# Patient Record
Sex: FEMALE | Race: WHITE | Employment: FULL TIME | URBAN - METROPOLITAN AREA
[De-identification: names, ages, dates, MRNs, and addresses within clinical notes are randomized per-mention and may not be internally consistent; named-entity substitution may affect disease eponyms.]

---

## 2017-04-19 ENCOUNTER — APPOINTMENT (OUTPATIENT)
Dept: RADIOLOGY | Facility: IMAGING CENTER | Age: 57
End: 2017-04-19
Attending: PHYSICIAN ASSISTANT
Payer: COMMERCIAL

## 2017-04-19 ENCOUNTER — OFFICE VISIT (OUTPATIENT)
Dept: URGENT CARE | Facility: CLINIC | Age: 57
End: 2017-04-19
Payer: COMMERCIAL

## 2017-04-19 VITALS
TEMPERATURE: 97 F | BODY MASS INDEX: 22.66 KG/M2 | HEART RATE: 77 BPM | RESPIRATION RATE: 16 BRPM | DIASTOLIC BLOOD PRESSURE: 68 MMHG | WEIGHT: 149 LBS | SYSTOLIC BLOOD PRESSURE: 110 MMHG | OXYGEN SATURATION: 97 %

## 2017-04-19 DIAGNOSIS — J06.9 URI WITH COUGH AND CONGESTION: ICD-10-CM

## 2017-04-19 DIAGNOSIS — J44.1 COPD EXACERBATION (HCC): ICD-10-CM

## 2017-04-19 DIAGNOSIS — J01.40 ACUTE PANSINUSITIS, UNSPECIFIED: ICD-10-CM

## 2017-04-19 DIAGNOSIS — J40 BRONCHITIS: ICD-10-CM

## 2017-04-19 DIAGNOSIS — J40 BRONCHITIS: Primary | ICD-10-CM

## 2017-04-19 PROCEDURE — 99214 OFFICE O/P EST MOD 30 MIN: CPT | Performed by: PHYSICIAN ASSISTANT

## 2017-04-19 PROCEDURE — 71020 DX-CHEST-2 VIEWS: CPT | Mod: TC | Performed by: PHYSICIAN ASSISTANT

## 2017-04-19 RX ORDER — DOXYCYCLINE HYCLATE 100 MG
100 TABLET ORAL 2 TIMES DAILY
Qty: 14 TAB | Refills: 0 | Status: SHIPPED | OUTPATIENT
Start: 2017-04-19 | End: 2017-04-26

## 2017-04-19 RX ORDER — METHYLPREDNISOLONE 4 MG/1
TABLET ORAL
Qty: 21 TAB | Refills: 0 | Status: SHIPPED | OUTPATIENT
Start: 2017-04-19 | End: 2017-04-25

## 2017-04-19 ASSESSMENT — COPD QUESTIONNAIRES: COPD: 1

## 2017-04-19 ASSESSMENT — ENCOUNTER SYMPTOMS: COUGH: 1

## 2017-04-19 NOTE — MR AVS SNAPSHOT
"        Sita Medina   2017 4:45 PM   Office Visit   MRN: [id number]    Department:  [facility]   Dept Phone:  [phone]    Description:  Female : 1960   Provider:  Johnny Jaffe PA-C           Reason for Visit     Cough x 1 wk, productive cough, chest hurst to breath, shortness of breath and wheezing.  Little stuffy nose and  Lt. ear fullness      Allergies as of 2017     Allergen Noted Reactions    Ceftin 2008   Vomiting    Sounds like \"ceftin\"    Cefzil [Cefprozil] 2017         You were diagnosed with     Bronchitis   [895290]  -  Primary     URI with cough and congestion   [9360212]       Acute pansinusitis, unspecified   [5846282]       COPD exacerbation (CMS-HCC)   [023984]         Vital Signs     Blood Pressure Pulse Temperature Respirations Weight Oxygen Saturation    110/68 mmHg 77 36.1 °C (97 °F) 16 67.586 kg (149 lb) 97%    Last Menstrual Period                   2008           Basic Information     Date Of Birth Sex Race Ethnicity Preferred Language    1960 Female White Unknown English      Problem List              ICD-10-CM Priority Class Noted - Resolved    Shoulder pain, bilateral M25.511, M25.512   10/4/2010 - Present    Sinusitis J32.9   10/4/2010 - Present    Eczema L30.9   10/4/2010 - Present    COPD (chronic obstructive pulmonary disease) (CMS-HCC) J44.9   10/4/2010 - Present    Osteopenia M85.80   10/4/2010 - Present    Health maintenance examination Z00.00   10/4/2010 - Present      Health Maintenance        Date Due Completion Dates    IMM DTaP/Tdap/Td Vaccine (1 - Tdap) 1977 ---    IMM PNEUMOCOCCAL 19-64 (ADULT) MEDIUM RISK SERIES (1 of 1 - PPSV23) 1977 ---    PAP SMEAR 1979 ---    COLONOSCOPY 2008 ---    MAMMOGRAM 2009, 2008, 2007, 2007, 2007, 2007, 2006, 2005            Current Immunizations     No immunizations on file.      Below and/or attached are the " medications your provider expects you to take. Review all of your home medications and newly ordered medications with your provider and/or pharmacist. Follow medication instructions as directed by your provider and/or pharmacist. Please keep your medication list with you and share with your provider. Update the information when medications are discontinued, doses are changed, or new medications (including over-the-counter products) are added; and carry medication information at all times in the event of emergency situations     Allergies:  CEFTIN - Vomiting     CEFZIL - (reactions not documented)               Medications  Valid as of: April 19, 2017 -  5:36 PM    Generic Name Brand Name Tablet Size Instructions for use    ALBUTEROL INH 100MG/250 ML NS   1 Puff by Inhalation route PRN Every 4-6 prn         Doxycycline Hyclate (Tab) VIBRAMYCIN 100 MG Take 1 Tab by mouth 2 times a day for 7 days.        Fluticasone Furoate-Vilanterol   Inhale  by mouth.        Fluticasone Propionate HFA (Aerosol) FLOVENT  MCG/ACT Inhale 2 Puffs by mouth 2 times a day.        Fluticasone Propionate HFA (Aerosol) FLOVENT  MCG/ACT Inhale 2 Puffs by mouth 2 times a day.        Hydrocod Polst-Chlorphen Polst (Suspension Extended Release) TUSSIONEX 10-8 MG/5ML Take 5 mL by mouth every 12 hours.        MethylPREDNISolone (Tablet Therapy Pack) MEDROL DOSEPAK 4 MG Use as directed        Mometasone Furo-Formoterol Fum (Aerosol) Mometasone Furo-Formoterol Fum 200-5 MCG/ACT Inhale  by mouth.        Salmeterol Xinafoate (AEROSOL POWDER, BREATH ACTIVATED) SEREVENT 50 MCG/DOSE Inhale 1 Puff by mouth 2 times a day.        .                 Medicines prescribed today were sent to:     [id number] - [place] - [address]    [address] [place] [address]    Phone: [phone] Fax: [phone]    Open 24 Hours?: No      Medication refill instructions:       If your prescription bottle indicates you have medication  refills left, it is not necessary to call your provider’s office. Please contact your pharmacy and they will refill your medication.    If your prescription bottle indicates you do not have any refills left, you may request refills at any time through one of the following ways: The online [email or web address] system (except Urgent Care), by calling your provider’s office, or by asking your pharmacy to contact your provider’s office with a refill request. Medication refills are processed only during regular business hours and may not be available until the next business day. Your provider may request additional information or to have a follow-up visit with you prior to refilling your medication.   *Please Note: Medication refills are assigned a new Rx number when refilled electronically. Your pharmacy may indicate that no refills were authorized even though a new prescription for the same medication is available at the pharmacy. Please request the medicine by name with the pharmacy before contacting your provider for a refill.        Your To Do List     Future Labs/Procedures Complete By Expires    DX-CHEST-2 VIEWS  As directed 4/19/2018      Instructions    Bronchitis  Bronchitis is the body's way of reacting to injury and/or infection (inflammation) of the bronchi. Bronchi are the air tubes that extend from the windpipe into the lungs. If the inflammation becomes severe, it may cause shortness of breath.  CAUSES   Inflammation may be caused by:  · A virus.  · Germs (bacteria).  · Dust.  · Allergens.  · Pollutants and many other irritants.  The cells lining the bronchial tree are covered with tiny hairs (cilia). These constantly beat upward, away from the lungs, toward the mouth. This keeps the lungs free of pollutants. When these cells become too irritated and are unable to do their job, mucus begins to develop. This causes the characteristic cough of bronchitis. The cough clears the lungs when the cilia are unable to do their  job. Without either of these protective mechanisms, the mucus would settle in the lungs. Then you would develop pneumonia.  Smoking is a common cause of bronchitis and can contribute to pneumonia. Stopping this habit is the single most important thing you can do to help yourself.  TREATMENT   · Your caregiver may prescribe an antibiotic if the cough is caused by bacteria. Also, medicines that open up your airways make it easier to breathe. Your caregiver may also recommend or prescribe an expectorant. It will loosen the mucus to be coughed up. Only take over-the-counter or prescription medicines for pain, discomfort, or fever as directed by your caregiver.  · Removing whatever causes the problem (smoking, for example) is critical to preventing the problem from getting worse.  · Cough suppressants may be prescribed for relief of cough symptoms.  · Inhaled medicines may be prescribed to help with symptoms now and to help prevent problems from returning.  · For those with recurrent (chronic) bronchitis, there may be a need for steroid medicines.  SEEK IMMEDIATE MEDICAL CARE IF:   · During treatment, you develop more pus-like mucus (purulent sputum).  · You have a fever.  · Your baby is older than 3 months with a rectal temperature of 102° F (38.9° C) or higher.  · Your baby is 3 months old or younger with a rectal temperature of 100.4° F (38° C) or higher.  · You become progressively more ill.  · You have increased difficulty breathing, wheezing, or shortness of breath.  It is necessary to seek immediate medical care if you are elderly or sick from any other disease.  MAKE SURE YOU:   · Understand these instructions.  · Will watch your condition.  · Will get help right away if you are not doing well or get worse.  Document Released: 12/18/2006 Document Revised: 03/11/2013 Document Reviewed: 10/27/2009  k[employer]ify® Patient Information ©2014 [employer].            TSC[employer] Access Code: [email or web address]  Expires:  5/7/2017 10:05 AM    Your email address is not on file at [email or web address].  Email Addresses are required for you to sign up for newScale, please contact [phone] to verify your personal information and to provide your email address prior to attempting to register for newScale.    Sita Medina  [address] [place] [address]    Videumhart  A secure, online tool to manage your health information     [email or web address]’s newScale® is a secure, online tool that connects you to your personalized health information from the privacy of your home -- day or night - making it very easy for you to manage your healthcare. Once the activation process is completed, you can even access your medical information using the newScale vivek, which is available for free in the Apple Vivek store or Google Play store.     To learn more about newScale, visit [email or web address]    There are two levels of access available (as shown below):   My Chart Features  [facility] Primary Care Doctor [facility]  Specialists [facility]  Urgent  Care Non-[facility] Primary Care Doctor   Email your healthcare team securely and privately 24/7 X X X    Manage appointments: schedule your next appointment; view details of past/upcoming appointments X      Request prescription refills. X      View recent personal medical records, including lab and immunizations X X X X   View health record, including health history, allergies, medications X X X X   Read reports about your outpatient visits, procedures, consult and ER notes X X X X   See your discharge summary, which is a recap of your hospital and/or ER visit that includes your diagnosis, lab results, and care plan X X  X     How to register for [employer]:  Once your e-mail address has been verified, follow the following steps to sign up for [employer].     1. Go to  [email or web address]  2. Click on the Sign Up Now box, which takes you to the New Member Sign Up page. You will need to provide the following  information:  a. Enter your Convene Access Code exactly as it appears at the top of this page. (You will not need to use this code after you’ve completed the sign-up process. If you do not sign up before the expiration date, you must request a new code.)   b. Enter your date of birth.   c. Enter your home email address.   d. Click Submit, and follow the next screen’s instructions.  3. Create a Convene ID. This will be your Convene login ID and cannot be changed, so think of one that is secure and easy to remember.  4. Create a Convene password. You can change your password at any time.  5. Enter your Password Reset Question and Answer. This can be used at a later time if you forget your password.   6. Enter your e-mail address. This allows you to receive e-mail notifications when new information is available in Convene.  7. Click Sign Up. You can now view your health information.    For assistance activating your Convene account, call [phone]

## 2017-04-20 NOTE — PROGRESS NOTES
Subjective:      PT is a 57 y.o. female who presents with Cough            Cough  This is a new problem. The current episode started in the past 7 days. The problem has been gradually worsening. The problem occurs constantly. The cough is productive of purulent sputum. The symptoms are aggravated by cold air, exercise and lying down. She has tried OTC cough suppressant for the symptoms. The treatment provided no relief. Her past medical history is significant for bronchitis, COPD and pneumonia. There is no history of asthma, bronchiectasis or emphysema.   PT presents to  clinic today complaining of sore throat, fevers, chills, watery eyes, pressure in ears, cough, fatigue, runny nose, wheezing and SOB. PT denies CP, NVD, abdominal pain, joint pain. PT states these symptoms began around 7 days ago and that the pt's family has been sick on and off for the last week. Pt has not taken any medications for this condition. PT states the pain is a 7/10 with coughing fits, aching in nature and worse at night.  The pt's medication list, problem list, and allergies have been evaluated and reviewed during today's visit.    PMH:  Past Medical History   Diagnosis Date   • ASTHMA    • Recurrent acute sinusitis    • Eczema 10/4/2010   • COPD (chronic obstructive pulmonary disease) (CMS-HCC) 10/4/2010   • COPD (chronic obstructive pulmonary disease) (CMS-HCC) 10/4/2010   • Osteopenia 10/4/2010       PSH:  Negative per pt.      Fam Hx:    family history includes Allergies in her mother; Cancer in her mother; Diabetes in her father.  Family Status   Relation Status Death Age   • Mother Alive    • Father Alive    • Brother Alive    • Brother Alive        Soc HX:  Social History     Social History   • Marital Status:      Spouse Name: N/A   • Number of Children: N/A   • Years of Education: N/A     Occupational History   • Not on file.     Social History Main Topics   • Smoking status: Former Smoker     Quit date: 01/04/2010   •  Smokeless tobacco: Not on file   • Alcohol Use: No   • Drug Use: No   • Sexual Activity:     Partners: Male     Other Topics Concern   • Not on file     Social History Narrative         Medications:    Current outpatient prescriptions:   •  Fluticasone Furoate-Vilanterol (BREO ELLIPTA INH), Inhale  by mouth., Disp: , Rfl:   •  doxycycline (VIBRAMYCIN) 100 MG Tab, Take 1 Tab by mouth 2 times a day., Disp: 14 Tab, Rfl: 0  •  predniSONE (DELTASONE) 20 MG Tab, Take 2 Tabs by mouth every day., Disp: 14 Tab, Rfl: 0  •  Mometasone Furo-Formoterol Fum (DULERA) 200-5 MCG/ACT AERO, Inhale  by mouth., Disp: , Rfl:   •  methylPREDNISolone (MEDROL DOSPACK) 4 MG TABS, Use PRIETO as directed, Disp: 1 Tab, Rfl: 0  •  fluticasone (FLOVENT HFA) 220 MCG/ACT AERO, Inhale 2 Puffs by mouth 2 times a day., Disp: 1 Inhaler, Rfl: 1  •  salmeterol (SEREVENT DISKUS) 50 MCG/DOSE AEPB, Inhale 1 Puff by mouth 2 times a day., Disp: 1 Each, Rfl: 1  •  fluticasone (FLOVENT HFA) 110 MCG/ACT AERO, Inhale 2 Puffs by mouth 2 times a day., Disp: , Rfl:   •  ALBUTEROL INH 100MG/250 ML NS, 1 Puff by Inhalation route PRN Every 4-6 prn , Disp: , Rfl:       Allergies:  Ceftin and Cefzil        Review of Systems   Respiratory: Positive for cough.    Constitutional: Positive for chills and malaise/fatigue. Negative for fever and diaphoresis.   HENT: Positive for congestion, ear pain and sore throat. Negative for ear discharge, hearing loss, nosebleeds and tinnitus.    Eyes: Negative for blurred vision, double vision and photophobia.   Respiratory continued: Positive for cough, sputum production, shortness of breath and wheezing. Negative for hemoptysis.    Cardiovascular: Negative for chest pain and palpitations.   Gastrointestinal: Negative for nausea, vomiting, abdominal pain, diarrhea and constipation.   Genitourinary: Negative for dysuria and flank pain.   Musculoskeletal: Negative for joint pain and myalgias.   Skin: Negative for itching and rash.    Neurological: Positive for headaches. Negative for dizziness, tingling and weakness.   Endo/Heme/Allergies: Does not bruise/bleed easily.   Psychiatric/Behavioral: Negative for depression. The patient is not nervous/anxious.               Objective:     /68 mmHg  Pulse 77  Temp(Src) 36.1 °C (97 °F)  Resp 16  Wt 67.586 kg (149 lb)  SpO2 97%  LMP 04/02/2008     Physical Exam       Physical Exam   Constitutional: PT is oriented to person, place, and time. PT appears well-developed and well-nourished. No distress.   HENT:   Head: Normocephalic and atraumatic.   Right Ear: Hearing, tympanic membrane, external ear and ear canal normal.   Left Ear: Hearing, tympanic membrane, external ear and ear canal normal.   Nose: Mucosal edema, rhinorrhea and sinus tenderness present. Right sinus exhibits frontal sinus tenderness. Left sinus exhibits frontal sinus tenderness.   Mouth/Throat: Uvula is midline. Mucous membranes are pale. Posterior oropharyngeal edema and posterior oropharyngeal erythema present. No oropharyngeal exudate.   Eyes: Conjunctivae normal and EOM are normal. Pupils are equal, round, and reactive to light. Right eye exhibits no discharge. Left eye exhibits no discharge.   Neck: Normal range of motion. Neck supple. No thyromegaly present.   Cardiovascular: Normal rate, regular rhythm, normal heart sounds and intact distal pulses.  Exam reveals no gallop and no friction rub.    No murmur heard.  Pulmonary/Chest: Effort normal. No respiratory distress. PT has wheezes. PT has no rales. PT exhibits tenderness.   Abdominal: Soft. Bowel sounds are normal. PT exhibits no distension and no mass. There is no tenderness. There is no rebound and no guarding.   Musculoskeletal: Normal range of motion. PT exhibits no edema and no tenderness.   Lymphadenopathy:     PT has no cervical adenopathy.   Neurological: Pt is alert and oriented to person, place, and time. Pt has normal reflexes. No cranial nerve deficit.    Skin: Skin is warm and dry. No rash noted. No erythema.   Psychiatric: PT has a normal mood and affect. Pt behavior is normal. Judgment and thought content normal.      RADS:    Narrative        4/19/2017 5:19 PM    HISTORY/REASON FOR EXAM:  Cough.  Left chest pain    TECHNIQUE/EXAM DESCRIPTION AND NUMBER OF VIEWS:  Two views of the chest.    COMPARISON:  None.    FINDINGS:  The lungs are clear.    The cardiac silhouette: normal in size.    Pleura: There are no pleural effusion or pneumothoraces.    Osseous structures: No significant bony abnormality is present.         Impression        Negative two views of the chest.         Reading Provider Reading Date     Narinder Rocha M.D. Apr 19, 2017         Signing Provider Signing Date Signing Time     Narinder Rocha M.D. Apr 19, 2017  5:24 PM         Assessment/Plan:     1. Bronchitis    - DX-CHEST-2 VIEWS; Future    2. URI with cough and congestion    - DX-CHEST-2 VIEWS; Future    3. Acute pansinusitis, unspecified    4. COPD exacerbation    Doxycycline  Tussionex  Medrol pack  Rest, fluids encouraged.  OTC decongestant for congestion/cough  AVS with medical info given.  Pt was in full understanding and agreement with the plan.  Follow-up as needed if symptoms worsen or fail to improve.

## 2017-04-20 NOTE — PATIENT INSTRUCTIONS

## 2018-10-02 ENCOUNTER — OFFICE VISIT (OUTPATIENT)
Dept: URGENT CARE | Facility: CLINIC | Age: 58
End: 2018-10-02
Payer: COMMERCIAL

## 2018-10-02 VITALS
OXYGEN SATURATION: 96 % | TEMPERATURE: 98.8 F | BODY MASS INDEX: 22.43 KG/M2 | RESPIRATION RATE: 18 BRPM | SYSTOLIC BLOOD PRESSURE: 100 MMHG | HEIGHT: 68 IN | DIASTOLIC BLOOD PRESSURE: 58 MMHG | WEIGHT: 148 LBS | HEART RATE: 63 BPM

## 2018-10-02 DIAGNOSIS — J01.01 ACUTE RECURRENT MAXILLARY SINUSITIS: ICD-10-CM

## 2018-10-02 PROCEDURE — 99214 OFFICE O/P EST MOD 30 MIN: CPT | Performed by: PHYSICIAN ASSISTANT

## 2018-10-02 RX ORDER — AMOXICILLIN AND CLAVULANATE POTASSIUM 875; 125 MG/1; MG/1
1 TABLET, FILM COATED ORAL 2 TIMES DAILY
Qty: 14 TAB | Refills: 0 | Status: SHIPPED | OUTPATIENT
Start: 2018-10-02 | End: 2018-10-09

## 2018-10-02 ASSESSMENT — ENCOUNTER SYMPTOMS
SINUS PRESSURE: 1
SWOLLEN GLANDS: 0
SENSORY CHANGE: 0
ABDOMINAL PAIN: 0
COUGH: 1
TINGLING: 0
SORE THROAT: 0
HEADACHES: 1
CHILLS: 0
SPUTUM PRODUCTION: 0
HOARSE VOICE: 0
FEVER: 0
SINUS PAIN: 1
PALPITATIONS: 0
VOMITING: 0
MYALGIAS: 0
DIARRHEA: 0
NAUSEA: 0
WHEEZING: 0
FOCAL WEAKNESS: 0
SHORTNESS OF BREATH: 0

## 2018-10-02 NOTE — PROGRESS NOTES
Subjective:      Sita Medina is a 58 y.o. female who presents with Sinus Problem (C/o green/bloody mucus discarge from nose, sinus congestion, post nasal drip, productive cough x2 days )            Sinus Problem   This is a new problem. Episode onset: 3-4 days  The problem has been gradually worsening since onset. There has been no fever. Associated symptoms include congestion, coughing (brownish-green ), headaches and sinus pressure (with purulent and blood rhinorrhea ). Pertinent negatives include no chills, ear pain, hoarse voice, shortness of breath, sore throat or swollen glands. Treatments tried: Aireborne, Breo  The treatment provided no relief.     Patient has history of COPD and asthma. Current smoker. She also has history of recurrent sinusitis.     Past Medical History:   Diagnosis Date   • ASTHMA    • COPD (chronic obstructive pulmonary disease) ([facility]) 10/4/2010   • COPD (chronic obstructive pulmonary disease) ([facility]) 10/4/2010   • Eczema 10/4/2010   • Osteopenia 10/4/2010   • Recurrent acute sinusitis        History reviewed. No pertinent surgical history.    Family History   Problem Relation Age of Onset   • Cancer Mother         breast   • Allergies Mother    • Diabetes Father        Allergies   Allergen Reactions   • Cefzil [Cefprozil]        Medications, Allergies, and current problem list reviewed today in Epic    Review of Systems   Constitutional: Negative for chills, fever and malaise/fatigue.   HENT: Positive for congestion, sinus pain and sinus pressure (with purulent and blood rhinorrhea ). Negative for ear discharge, ear pain, hoarse voice and sore throat.    Respiratory: Positive for cough (brownish-green ). Negative for sputum production, shortness of breath and wheezing.    Cardiovascular: Negative for chest pain, palpitations and leg swelling.   Gastrointestinal: Negative for abdominal pain, diarrhea, nausea and vomiting.   Musculoskeletal: Negative for myalgias.   Skin: Negative  "for rash.   Neurological: Positive for headaches. Negative for tingling, sensory change and focal weakness.     All other systems reviewed and are negative.          Objective:     /58 (BP Location: Left arm, Patient Position: Sitting, BP Cuff Size: Adult)   Pulse 63   Temp 37.1 °C (98.8 °F) (Temporal)   Resp 18   Ht 1.727 m (5' 8\")   Wt 67.1 kg (148 lb)   LMP 04/02/2008   SpO2 96%   BMI 22.50 kg/m²      Physical Exam   Constitutional: She is oriented to person, place, and time. She appears well-developed and well-nourished. No distress.   HENT:   Head: Normocephalic and atraumatic.   Right Ear: Tympanic membrane, external ear and ear canal normal.   Left Ear: Tympanic membrane, external ear and ear canal normal.   Nose: Mucosal edema and rhinorrhea present. Right sinus exhibits maxillary sinus tenderness. Left sinus exhibits maxillary sinus tenderness.   Mouth/Throat: Uvula is midline, oropharynx is clear and moist and mucous membranes are normal.   Eyes: Conjunctivae are normal.   Neck: Neck supple.   Cardiovascular: Normal rate, regular rhythm and normal heart sounds.  Exam reveals no gallop and no friction rub.    No murmur heard.  Pulmonary/Chest: Effort normal and breath sounds normal. No respiratory distress. She has no wheezes. She has no rales.   Lymphadenopathy:     She has no cervical adenopathy.   Neurological: She is alert and oriented to person, place, and time. No cranial nerve deficit.   Skin: Skin is warm and dry. No rash noted.   Psychiatric: She has a normal mood and affect. Her behavior is normal. Judgment and thought content normal.               Assessment/Plan:     1. Acute recurrent maxillary sinusitis  amoxicillin-clavulanate (AUGMENTIN) 875-125 MG Tab       •  amoxicillin-clavulanate (AUGMENTIN) 875-125 MG Tab, Take 1 Tab by mouth 2 times a day for 7 days., Disp: 14 Tab, Rfl: 0    - continue Breo     Differential diagnoses, Supportive care, and indications for immediate " follow-up discussed with patient.   Instructed to return to clinic or nearest emergency department for any change in condition, further concerns, or worsening of symptoms.    .  The patient demonstrated a good understanding and agreed with the treatment plan.    Frieda Awad P.A.-C.

## 2018-12-09 ENCOUNTER — OFFICE VISIT (OUTPATIENT)
Dept: URGENT CARE | Facility: CLINIC | Age: 58
End: 2018-12-09
Payer: COMMERCIAL

## 2018-12-09 VITALS
HEIGHT: 69 IN | BODY MASS INDEX: 20.73 KG/M2 | SYSTOLIC BLOOD PRESSURE: 100 MMHG | DIASTOLIC BLOOD PRESSURE: 60 MMHG | HEART RATE: 89 BPM | TEMPERATURE: 98.1 F | WEIGHT: 140 LBS | OXYGEN SATURATION: 95 %

## 2018-12-09 DIAGNOSIS — J02.0 STREP THROAT: ICD-10-CM

## 2018-12-09 PROCEDURE — 99214 OFFICE O/P EST MOD 30 MIN: CPT | Performed by: FAMILY MEDICINE

## 2018-12-09 RX ORDER — PREDNISONE 20 MG/1
TABLET ORAL
Qty: 3 TAB | Refills: 0 | Status: SHIPPED | OUTPATIENT
Start: 2018-12-09 | End: 2019-01-02

## 2018-12-09 RX ORDER — ISOPROPYL ALCOHOL 91 %
5000 SPRAY, NON-AEROSOL (ML) TOPICAL
Refills: 11 | COMMUNITY
Start: 2018-11-25 | End: 2019-11-04

## 2018-12-09 RX ORDER — IBUPROFEN 200 MG
200 TABLET ORAL EVERY 6 HOURS PRN
COMMUNITY
End: 2019-01-02

## 2018-12-09 RX ORDER — PENICILLIN V POTASSIUM 500 MG/1
500 TABLET ORAL 3 TIMES DAILY
Qty: 30 TAB | Refills: 0 | Status: SHIPPED | OUTPATIENT
Start: 2018-12-09 | End: 2018-12-19

## 2018-12-09 ASSESSMENT — ENCOUNTER SYMPTOMS
SHORTNESS OF BREATH: 0
SWOLLEN GLANDS: 1
HEADACHES: 0
CARDIOVASCULAR NEGATIVE: 1
TROUBLE SWALLOWING: 1
ABDOMINAL PAIN: 0
WHEEZING: 0
HOARSE VOICE: 0
STRIDOR: 0
COUGH: 0
EYES NEGATIVE: 1

## 2018-12-09 NOTE — PROGRESS NOTES
"Subjective:      Sita Medina is a 58 y.o. female who presents with Cold Symptoms (x3 days. Sore throat, post nasal drip, sinus congestion, fever.)            Pharyngitis    This is a new problem. Episode onset: 3 days. The problem has been gradually worsening. Maximum temperature: has had fever. The pain is moderate. Associated symptoms include swollen glands and trouble swallowing (lst night she felt she could not swallow whewn she layed down, but not at the present time). Pertinent negatives include no abdominal pain, coughing (had it few weeks ago), drooling, headaches, hoarse voice, shortness of breath or stridor. She has had no exposure to strep or mono. She has tried nothing for the symptoms. The treatment provided no relief.       Review of Systems   HENT: Positive for trouble swallowing (lst night she felt she could not swallow whewn she layed down, but not at the present time). Negative for drooling and hoarse voice.    Eyes: Negative.    Respiratory: Negative for cough (had it few weeks ago), shortness of breath, wheezing and stridor.    Cardiovascular: Negative.    Gastrointestinal: Negative for abdominal pain.   Skin: Negative.    Neurological: Negative for headaches.          Objective:     /60   Pulse 89   Temp 36.7 °C (98.1 °F)   Ht 1.753 m (5' 9\")   Wt 63.5 kg (140 lb)   LMP 04/02/2008   SpO2 95%   BMI 20.67 kg/m²      Physical Exam   Constitutional: She is oriented to person, place, and time. She appears well-developed and well-nourished.  Non-toxic appearance. She does not have a sickly appearance. She does not appear ill. No distress.   HENT:   Head: Normocephalic and atraumatic.   Right Ear: Tympanic membrane, external ear and ear canal normal.   Left Ear: Tympanic membrane, external ear and ear canal normal.   Nose: Nose normal. No rhinorrhea.   Mouth/Throat: Uvula is midline. No oral lesions. No trismus in the jaw. No uvula swelling. Oropharyngeal exudate and posterior " oropharyngeal erythema present. No posterior oropharyngeal edema or tonsillar abscesses. Tonsillar exudate.   No white patches seen in the mouth, some erythema also noted in the roof of the mouth tonsils are larger for this age between +1-+2 but no abscess seen, no asymmetry    Eyes: Conjunctivae are normal.   Neck: Neck supple.   Cardiovascular: Normal rate and regular rhythm.  Exam reveals no gallop and no friction rub.    No murmur heard.  Pulmonary/Chest: Effort normal. No stridor. No respiratory distress. She has no wheezes. She has no rales.   Lymphadenopathy:     She has cervical adenopathy.   Neurological: She is alert and oriented to person, place, and time.   Skin: Skin is warm. No rash noted. She is not diaphoretic. No pallor.       Rapid strep: neg            Assessment/Plan:     ASSESSMENT:PLAN:  1. Strep throat  - predniSONE (DELTASONE) 20 MG Tab; 60mg daily for 2 days  Dispense: 3 Tab; Refill: 0  - penicillin v potassium (VEETID) 500 MG Tab; Take 1 Tab by mouth 3 times a day for 10 days.  Dispense: 30 Tab; Refill: 0    Continue OTC meds  Salt water gurgles  Plan per orders and instructions  Warning signs reviewed

## 2018-12-09 NOTE — PATIENT INSTRUCTIONS
Follow up if not significantly improved as expected in 2-3 days, sooner if any worsening or new symptoms    Strep Throat  Strep throat is an infection of the throat. It is caused by germs. Strep throat spreads from person to person because of coughing, sneezing, or close contact.  Follow these instructions at home:  Medicines  · Take over-the-counter and prescription medicines only as told by your doctor.  · Take your antibiotic medicine as told by your doctor. Do not stop taking the medicine even if you feel better.  · Have family members who also have a sore throat or fever go to a doctor.  Eating and drinking  · Do not share food, drinking cups, or personal items.  · Try eating soft foods until your sore throat feels better.  · Drink enough fluid to keep your pee (urine) clear or pale yellow.  General instructions  · Rinse your mouth (gargle) with a salt-water mixture 3-4 times per day or as needed. To make a salt-water mixture, stir ½-1 tsp of salt into 1 cup of warm water.  · Make sure that all people in your house wash their hands well.  · Rest.  · Stay home from school or work until you have been taking antibiotics for 24 hours.  · Keep all follow-up visits as told by your doctor. This is important.  Contact a doctor if:  · Your neck keeps getting bigger.  · You get a rash, cough, or earache.  · You cough up thick liquid that is green, yellow-brown, or bloody.  · You have pain that does not get better with medicine.  · Your problems get worse instead of getting better.  · You have a fever.  Get help right away if:  · You throw up (vomit).  · You get a very bad headache.  · You neck hurts or it feels stiff.  · You have chest pain or you are short of breath.  · You have drooling, very bad throat pain, or changes in your voice.  · Your neck is swollen or the skin gets red and tender.  · Your mouth is dry or you are peeing less than normal.  · You keep feeling more tired or it is hard to wake up.  · Your joints  are red or they hurt.  This information is not intended to replace advice given to you by your health care provider. Make sure you discuss any questions you have with your health care provider.  Document Released: 06/05/2009 Document Revised: 08/16/2017 Document Reviewed: 04/11/2016  Elsevier Interactive Patient Education © 2017 [employer]

## 2019-01-01 ENCOUNTER — HOSPITAL ENCOUNTER (EMERGENCY)
Facility: MEDICAL CENTER | Age: 59
End: 2019-01-02
Attending: EMERGENCY MEDICINE
Payer: COMMERCIAL

## 2019-01-01 DIAGNOSIS — J44.1 ACUTE EXACERBATION OF CHRONIC OBSTRUCTIVE PULMONARY DISEASE (COPD) (HCC): ICD-10-CM

## 2019-01-01 DIAGNOSIS — J18.9 ATYPICAL PNEUMONIA: ICD-10-CM

## 2019-01-01 DIAGNOSIS — R52 BODY ACHES: ICD-10-CM

## 2019-01-01 DIAGNOSIS — J45.901 MILD ASTHMA WITH ACUTE EXACERBATION, UNSPECIFIED WHETHER PERSISTENT: ICD-10-CM

## 2019-01-01 DIAGNOSIS — R53.83 OTHER FATIGUE: ICD-10-CM

## 2019-01-01 DIAGNOSIS — J10.1 INFLUENZA A: ICD-10-CM

## 2019-01-01 PROCEDURE — 99284 EMERGENCY DEPT VISIT MOD MDM: CPT

## 2019-01-01 ASSESSMENT — PAIN SCALES - GENERAL: PAINLEVEL_OUTOF10: 0

## 2019-01-02 ENCOUNTER — APPOINTMENT (OUTPATIENT)
Dept: RADIOLOGY | Facility: MEDICAL CENTER | Age: 59
End: 2019-01-02
Attending: EMERGENCY MEDICINE
Payer: COMMERCIAL

## 2019-01-02 VITALS
SYSTOLIC BLOOD PRESSURE: 135 MMHG | HEIGHT: 69 IN | RESPIRATION RATE: 16 BRPM | BODY MASS INDEX: 21.52 KG/M2 | DIASTOLIC BLOOD PRESSURE: 71 MMHG | TEMPERATURE: 98.2 F | HEART RATE: 94 BPM | OXYGEN SATURATION: 92 % | WEIGHT: 145.28 LBS

## 2019-01-02 LAB
FLUAV RNA SPEC QL NAA+PROBE: POSITIVE
FLUBV RNA SPEC QL NAA+PROBE: NEGATIVE

## 2019-01-02 PROCEDURE — 87502 INFLUENZA DNA AMP PROBE: CPT

## 2019-01-02 PROCEDURE — 94640 AIRWAY INHALATION TREATMENT: CPT

## 2019-01-02 PROCEDURE — 700101 HCHG RX REV CODE 250: Performed by: EMERGENCY MEDICINE

## 2019-01-02 PROCEDURE — 71045 X-RAY EXAM CHEST 1 VIEW: CPT

## 2019-01-02 RX ORDER — PREDNISONE 20 MG/1
20 TABLET ORAL DAILY
Qty: 5 TAB | Refills: 0 | Status: SHIPPED | OUTPATIENT
Start: 2019-01-02 | End: 2019-01-07

## 2019-01-02 RX ORDER — DEXAMETHASONE SODIUM PHOSPHATE 10 MG/ML
10 INJECTION, SOLUTION INTRAMUSCULAR; INTRAVENOUS ONCE
Status: DISCONTINUED | OUTPATIENT
Start: 2019-01-02 | End: 2019-01-02 | Stop reason: HOSPADM

## 2019-01-02 RX ORDER — DEXAMETHASONE SODIUM PHOSPHATE 4 MG/ML
10 INJECTION, SOLUTION INTRA-ARTICULAR; INTRALESIONAL; INTRAMUSCULAR; INTRAVENOUS; SOFT TISSUE ONCE
Status: DISCONTINUED | OUTPATIENT
Start: 2019-01-02 | End: 2019-01-02 | Stop reason: HOSPADM

## 2019-01-02 RX ORDER — IPRATROPIUM BROMIDE AND ALBUTEROL SULFATE 2.5; .5 MG/3ML; MG/3ML
3 SOLUTION RESPIRATORY (INHALATION) ONCE
Status: COMPLETED | OUTPATIENT
Start: 2019-01-02 | End: 2019-01-02

## 2019-01-02 RX ORDER — PREDNISONE 10 MG/1
10 TABLET ORAL PRN
Status: SHIPPED | COMMUNITY
End: 2019-01-02

## 2019-01-02 RX ORDER — AZITHROMYCIN 250 MG/1
TABLET, FILM COATED ORAL
Qty: 6 TAB | Refills: 0 | Status: SHIPPED | OUTPATIENT
Start: 2019-01-02 | End: 2019-11-04

## 2019-01-02 RX ORDER — DEXAMETHASONE SODIUM PHOSPHATE 4 MG/ML
INJECTION, SOLUTION INTRA-ARTICULAR; INTRALESIONAL; INTRAMUSCULAR; INTRAVENOUS; SOFT TISSUE
Status: DISCONTINUED
Start: 2019-01-02 | End: 2019-01-02 | Stop reason: HOSPADM

## 2019-01-02 RX ADMIN — IPRATROPIUM BROMIDE AND ALBUTEROL SULFATE 3 ML: .5; 3 SOLUTION RESPIRATORY (INHALATION) at 00:28

## 2019-01-02 NOTE — ED NOTES
[place] from Lab called with critical result of flu a positive at 0116. Critical lab result read back to [place].   Dr. Phillips notified of critical lab result at 0116  Critical lab result read back by Dr. Phillips.

## 2019-01-02 NOTE — ED NOTES
Patient provided printed discharge instructions which included signs and symptoms to look out for, why to return to ER, and other follow up appointment to make. Patient provided prescriptions, information on medications, and how to . Patient stated they understand discharge instructions and had no further questions or concerns at this time. Patient discharged to home by self. Patient ambulated out of ER with stable gait.

## 2019-01-02 NOTE — FLOWSHEET NOTE
01/02/19 0030   Events/Summary/Plan   Events/Summary/Plan duo X 3   Interdisciplinary Plan of Care-Goals (Indications)   Bronchodilator Indications History / Diagnosis   Interdisciplinary Plan of Care-Outcomes    Bronchodilator Outcome Improved Vital Signs and Measures of Gas Exchange   Education   Education Yes - Pt. / Family has been Instructed in use of Respiratory Equipment;Yes - Pt. / Family has been Instructed in use of Respiratory Medications and Adverse Reactions   RT Assessment of Delivered Medications   Evaluation of Medication Delivery Daily Yes-- Pt /Family has been Instructed in use of Respiratory Medications and Adverse Reactions   SVN Group   #SVN Performed Yes   Given By: Mouthpiece   Date SVN Last Changed 01/02/19   Date SVN Next Change Due (Q 7 Days) 01/09/19   Respiratory WDL   Respiratory (WDL) X   Chest Exam   Respiration 18   Pulse 90   Breath Sounds   Pre/Post Intervention Pre Intervention Assessment   RUL Breath Sounds Diminished   RML Breath Sounds Diminished   RLL Breath Sounds Diminished   BLAISE Breath Sounds Diminished   LLL Breath Sounds Fine Crackles   Oximetry   Continuous Oximetry Yes   Oxygen   Pulse Oximetry 97 %   O2 (LPM) 0   FiO2% 21 %   O2 Daily Delivery Respiratory  Room Air with O2 Available

## 2019-01-02 NOTE — ED TRIAGE NOTES
"Chief Complaint   Patient presents with   • Flu Like Symptoms     Since last Friday     /71   Pulse (!) 105   Temp 36.7 °C (98 °F) (Temporal)   Resp 18   Ht 1.753 m (5' 9\")   Wt 65.9 kg (145 lb 4.5 oz)   LMP 04/02/2008   BMI 21.45 kg/m²     "

## 2019-01-02 NOTE — ED PROVIDER NOTES
ED Provider Note    CHIEF COMPLAINT  Chief Complaint   Patient presents with   • Flu Like Symptoms     Since last Friday     [facility]    Primary care provider: Pcp Not In Computer  Means of arrival: POV  History obtained from: Patient  History limited by: Nothing    Sita Medina is a 58 y.o. female who presents with flulike illness for the last 5 days.  The patient had sick contacts at work last week, and several coworkers all developed what sounds like an upper respiratory infection.  The patient had an occasional cough and has been feeling like she is been wheezing.  She is also had fatigue and body aches.  Occasional rhinorrhea.  No sore throat or chest pain or vomiting or difficulty swallowing, no abdominal pain or extremity swelling.  She did receive a flu shot this year.  She has a history of asthma and COPD, is on Brio and did take 1 dose of prednisone today.  Symptoms have been constant and progressively worsening, prompting her to come in for evaluation and treatment.    REVIEW OF SYSTEMS  Constitutional: Positive for occasional chills and body aches and fatigue.  HENT: Positive for occasional rhinorrhea, no sore throat  Eyes: Negative for vision changes or discharge.   Respiratory: Positive for cough and shortness of breath.    Cardiovascular: Negative for chest pain or palpitations.   Gastrointestinal: Negative for nausea, vomiting, abdominal pain.   Genitourinary: Negative for dysuria or flank pain.   Musculoskeletal: Negative for back pain or joint pain.   Skin: Negative for itching or rash.   Neurological: Negative for sensory or motor changes.   See HPI for further details. All other systems are negative.     PAST MEDICAL HISTORY   has a past medical history of ASTHMA; COPD (chronic obstructive pulmonary disease) ([facility]) (10/4/2010); COPD (chronic obstructive pulmonary disease) ([facility]) (10/4/2010); Eczema (10/4/2010); Osteopenia (10/4/2010); and Recurrent acute sinusitis.    PAST FAMILY  "HISTORY  Family History   Problem Relation Age of Onset   • Cancer Mother         breast   • Allergies Mother    • Diabetes Father        SOCIAL HISTORY  Social History     Social History Main Topics   • Smoking status: Current Some Day Smoker     Last attempt to quit: 1/4/2010   • Smokeless tobacco: Never Used   • Alcohol use No   • Drug use: No   • Sexual activity: Yes     Partners: Male       SURGICAL HISTORY  patient denies any surgical history    CURRENT MEDICATIONS  Home Medications     Reviewed by Ester Flores R.N. (Registered Nurse) on 01/02/19 at 0010  Med List Status: Complete   Medication Last Dose Status   ALBUTEROL INH 100MG/250 ML NS 1/1/2019 Active   BREO ELLIPTA 100-25 MCG/INH AEROSOL POWDER, BREATH ACTIVATED 1/1/2019 Active   [employer] D3 5000 units Cap 1/1/2019 Active   predniSONE (DELTASONE) 10 MG Tab 1/1/2019 Active                ALLERGIES  Allergies   Allergen Reactions   • Cefzil [Cefprozil]      nausea       PHYSICAL EXAM  VITAL SIGNS: /71   Pulse 94   Temp 36.8 °C (98.2 °F) (Temporal)   Resp 16   Ht 1.753 m (5' 9\")   Wt 65.9 kg (145 lb 4.5 oz)   LMP 04/02/2008   SpO2 92%   BMI 21.45 kg/m²    Pulse ox interpretation: On room air, I interpret this pulse ox as normal.  Constitutional: Well developed, well nourished.  Sitting up in mild distress.  HEENT: Normocephalic, atraumatic. Posterior pharynx clear, mucous membranes moist.  Eyes:  EOMI. Normal sclera.  Neck: Supple, Full range of motion, nontender.  Chest/Pulmonary: Scattered expiratory wheezes in all fields, no tachypnea or increased work of breathing.  Cardiovascular: Tachycardic rate, regular rhythm, no murmur.   Abdomen: Soft, nontender, no rebound, guarding, or masses.  Back: No CVA tenderness, nontender midline, no step offs.  Musculoskeletal: No deformity, no edema.  Neuro: Clear speech, normal coordination, cranial nerves II-XII grossly intact.  Psych: Normal mood and affect.  Skin: No rashes, warm and " dry.    DIAGNOSTIC STUDIES / PROCEDURES    LABS & EKG  Results for orders placed or performed during the hospital encounter of 01/01/19   Influenza A/B By PCR   Result Value Ref Range    Influenza virus A RNA POSITIVE (A) Negative    Influenza virus B, PCR Negative Negative     RADIOLOGY  DX-CHEST-PORTABLE (1 VIEW)   Final Result      No acute cardiopulmonary findings.        COURSE & MEDICAL DECISION MAKING    This is a 58 y.o. female who presents with cough and wheezing and fatigue and body aches.  Possible exposure to flu.  Onset 4-5 days ago.    Differential Diagnosis includes but is not limited to:  Influenza, pneumonia, URI, asthma exacerbation, less likely PE, ACS    ED Course:  The patient is extra Tory wheezing, plan to treat her with DuoNeb's.  She took a dose of prednisone today and would not like to take any other steroids at this time as she often is very sensitive to them.  I discussed that they would likely help her breathing but she prefers to defer at this time.  She would also not like a blood draw or IV placement, I shared with her my concerns that she may be slightly dehydrated given she was mildly tachycardic, but even during my examination with her heart rate went down to the 80s.  As such I feel this is reasonable, the patient does not have any chest pain and she is in no respiratory distress, highly doubt ACS or PE.  She has had no bleeding highly doubt pneumonia.  She clearly has an upper respiratory illness so I will screen her with flu testing and a chest x-ray.    Flu a is positive.  Chest x-ray reassuring without lobar pneumonia or cardiomegaly or edema or widened mediastinum or pneumothorax.    On reassessment the patient feels slightly jittery after breathing treatments but is breathing more comfortably.  Her lung sounds are improved.  She has stable normal vital signs.  She would like to go home, and I feel that it is safe for her to do so.  Given that she does have a history of  asthma and started having a productive cough after breathing treatments I am worried that she could have an early atypical pneumonia so I will treat her with a macrolide which will also help with her with pulmonary inflammation given this is a likely asthma/COPD exacerbation triggered by influenza but a secondary infection could be present.  She will follow-up with her primary care provider for recheck in 1-2 days, but return immediately for any new pain, trouble breathing, vomiting, fevers or chills, or any other new or worsening symptoms.  All questions answered, prescription for prednisone and azithromycin provided, and I trust she will heed my advice and return if she gets any worse.    Medications   dexamethasone pf (DECADRON) injection 10 mg (0 mg Oral Dose not Required 1/2/19 0045)   dexamethasone (DECADRON) injection 10 mg (10 mg Oral Refused 1/2/19 0030)   ipratropium-albuterol (DUONEB) nebulizer solution (3 mL Nebulization Given 1/2/19 0028)   ipratropium-albuterol (DUONEB) nebulizer solution (3 mL Nebulization Given 1/2/19 0028)   ipratropium-albuterol (DUONEB) nebulizer solution (3 mL Nebulization Given 1/2/19 0028)     FINAL IMPRESSION  1. Mild asthma with acute exacerbation, unspecified whether persistent    2. Influenza A    3. Acute exacerbation of chronic obstructive pulmonary disease (COPD) (HCC)    4. Atypical pneumonia    5. Other fatigue    6. Body aches        PRESCRIPTIONS  Discharge Medication List as of 1/2/2019  1:48 AM      START taking these medications    Details   azithromycin (ZITHROMAX) 250 MG Tab Take two tabs by mouth on day one, then one tab by mouth daily on days 2-5., Disp-6 Tab, R-0, Print Rx Paper      predniSONE (DELTASONE) 20 MG Tab Take 1 Tab by mouth every day for 5 days., Disp-5 Tab, R-0, Print Rx Paper             FOLLOW UP  [facility], Emergency Dept  [address]  [place] [address]  [phone]  Today  If you have ANY new or worse  symptoms!    Your primary care provider    Schedule an appointment as soon as possible for a visit in 2 days  for a recheck    -DISCHARGE-     Results, exam findings, clinical impression, presumed diagnosis, treatment options, and strict return precautions were discussed with the patient, and they verbalized understanding, agreed with, and appreciated the plan of care.    Pertinent Labs & Imaging studies reviewed and verified by myself, as well as nursing notes and the patient's past medical, family, and social histories (See chart for details).    The patient is referred to a primary physician for blood pressure management, diabetic screening, and for all other preventative health concerns.     Portions of this record were made with voice recognition software.  Despite my review, spelling/grammar/context errors may still remain. Interpretation of this chart should be taken in this context.    Electronically signed by Wade Phillips on 1/2/2019 at 2:26 AM.

## 2019-01-02 NOTE — DISCHARGE INSTRUCTIONS
Family in [address] updated. You were seen and evaluated in the Emergency Department at [facility] for:     Wheezing, body aches.     You had the following tests and studies:    You have the flu! Thankfully your chest xray looks stable.     You received the following medications:    Duoneb breathing treatment.     You received the following prescriptions:    Azithromycin and prednisone. Take and prescribed.   ----------------------------    Please make sure to follow up with:    Your primary care provider for a recheck in 2-3 days, but return to the ER immediately if you have any worsening pain or fevers or trouble feeding or passing out or any other new or worsening symptoms.    Good luck, we hope you get better soon, and happy new year!  ----------------------------    We always encourage patients to return IMMEDIATELY if they have:  Increased or changing pain, passing out, fevers over 100.4 (taken in your mouth or rectally) for more than 2 days, redness or swelling of skin or tissues, feeling like your heart is beating fast, chest pain that is new or worsening, trouble breathing, feeling like your throat is closing up and can not breath, inability to walk, weakness of any part of your body, new dizziness, severe bleeding that won't stop from any part of your body, if you can't eat or drink, or if you have any other concerns.   If you feel worse, please know that you can always return with any questions, concerns, worse symptoms, or you are feeling unsafe. We certainly cannot say for sure that we have ruled out every illness or dangerous disease, but we feel that at this specific time, your exam, tests, and vital signs like heart rate and blood pressure are safe for discharge.

## 2019-01-02 NOTE — ED NOTES
Pt in bed, c/o cough for past 4 days and worried because coworker was diagnosed with the flu at the same time as her symptoms started, and pt worried about bronchitis of pneumonia.

## 2019-01-03 ENCOUNTER — PATIENT OUTREACH (OUTPATIENT)
Dept: HEALTH INFORMATION MANAGEMENT | Facility: OTHER | Age: 59
End: 2019-01-03

## 2019-03-23 ENCOUNTER — OFFICE VISIT (OUTPATIENT)
Dept: URGENT CARE | Facility: CLINIC | Age: 59
End: 2019-03-23
Payer: COMMERCIAL

## 2019-03-23 VITALS
SYSTOLIC BLOOD PRESSURE: 108 MMHG | OXYGEN SATURATION: 94 % | HEART RATE: 68 BPM | HEIGHT: 69 IN | BODY MASS INDEX: 21.48 KG/M2 | WEIGHT: 145 LBS | DIASTOLIC BLOOD PRESSURE: 64 MMHG | TEMPERATURE: 98.8 F

## 2019-03-23 DIAGNOSIS — J01.00 ACUTE NON-RECURRENT MAXILLARY SINUSITIS: ICD-10-CM

## 2019-03-23 DIAGNOSIS — B35.1 ONYCHOMYCOSIS: ICD-10-CM

## 2019-03-23 PROCEDURE — 99214 OFFICE O/P EST MOD 30 MIN: CPT | Performed by: PHYSICIAN ASSISTANT

## 2019-03-23 RX ORDER — AMOXICILLIN AND CLAVULANATE POTASSIUM 875; 125 MG/1; MG/1
1 TABLET, FILM COATED ORAL 2 TIMES DAILY
Qty: 20 TAB | Refills: 0 | Status: SHIPPED | OUTPATIENT
Start: 2019-03-23 | End: 2019-04-02

## 2019-03-23 ASSESSMENT — ENCOUNTER SYMPTOMS
SORE THROAT: 0
HEADACHES: 1
DIARRHEA: 0
SHORTNESS OF BREATH: 0
VOMITING: 0
CHILLS: 0
COUGH: 0
FEVER: 0
SINUS PAIN: 1
NAUSEA: 0
DIZZINESS: 0
ABDOMINAL PAIN: 0
SINUS PRESSURE: 1
SPUTUM PRODUCTION: 0

## 2019-03-23 NOTE — PROGRESS NOTES
"Subjective:      Sita Medina is a 58 y.o. female who presents with Sinus Problem (x2 months. Sinus congestion/pressure, post nasal drip.) and Toe Injury (x3 months. Pt injured toe and is worried about persistent redness around nail bed.)            Sinus Problem   This is a chronic problem. The current episode started more than 1 month ago (2 months). The problem has been waxing and waning since onset. There has been no fever. Her pain is at a severity of 2/10. The pain is mild. Associated symptoms include congestion, headaches and sinus pressure. Pertinent negatives include no chills, coughing, ear pain, shortness of breath or sore throat. Past treatments include nothing.   Toe Injury   This is a new problem. The current episode started more than 1 month ago (3 months). The problem occurs constantly. The problem has been unchanged. Associated symptoms include congestion and headaches. Pertinent negatives include no abdominal pain, chest pain, chills, coughing, fever, nausea, rash, sore throat or vomiting. Exacerbated by: palpation. She has tried nothing for the symptoms.     Patient reports a history of chronic sinus infections, she recently quit smoking and is hopeful that will help them finally clear. She also reports ongoing left great toe pain since injuring it 3 years ago on a door.     Review of Systems   Constitutional: Negative for chills and fever.   HENT: Positive for congestion, sinus pain and sinus pressure. Negative for ear pain and sore throat.    Respiratory: Negative for cough, sputum production and shortness of breath.    Cardiovascular: Negative for chest pain.   Gastrointestinal: Negative for abdominal pain, diarrhea, nausea and vomiting.   Genitourinary: Negative.    Musculoskeletal:        + toe pain   Skin: Negative for rash.   Neurological: Positive for headaches. Negative for dizziness.        Objective:     /64   Pulse 68   Temp 37.1 °C (98.8 °F)   Ht 1.753 m (5' 9\")   " Wt 65.8 kg (145 lb)   LMP 04/02/2008   SpO2 94%   BMI 21.41 kg/m²      Physical Exam   Constitutional: She is oriented to person, place, and time. She appears well-developed and well-nourished. No distress.   HENT:   Head: Normocephalic and atraumatic.   Right Ear: Hearing, tympanic membrane, external ear and ear canal normal.   Left Ear: Hearing, tympanic membrane, external ear and ear canal normal.   Nose: Mucosal edema present. Right sinus exhibits maxillary sinus tenderness. Right sinus exhibits no frontal sinus tenderness. Left sinus exhibits maxillary sinus tenderness. Left sinus exhibits no frontal sinus tenderness.   Mouth/Throat: Oropharynx is clear and moist. No oropharyngeal exudate, posterior oropharyngeal edema or posterior oropharyngeal erythema.   Eyes: Pupils are equal, round, and reactive to light. Conjunctivae are normal. Right eye exhibits no discharge. Left eye exhibits no discharge.   Neck: Normal range of motion.   Cardiovascular: Normal rate, regular rhythm and normal heart sounds.    No murmur heard.  Pulmonary/Chest: Effort normal and breath sounds normal. No respiratory distress. She has no wheezes.   Musculoskeletal: Normal range of motion.        Feet:    No erythema or edema of left great toe. Subungual hematoma present. Thickened and yellow discoloration of left great toe nail. + TTP over distal aspect of toe.    Lymphadenopathy:     She has no cervical adenopathy.   Neurological: She is alert and oriented to person, place, and time.   Skin: Skin is warm and dry. She is not diaphoretic.   Psychiatric: She has a normal mood and affect. Her behavior is normal.   Nursing note and vitals reviewed.         PMH:  has a past medical history of ASTHMA; COPD (chronic obstructive pulmonary disease) ([facility]) (10/4/2010); COPD (chronic obstructive pulmonary disease) ([facility]) (10/4/2010); Eczema (10/4/2010); Osteopenia (10/4/2010); and Recurrent acute sinusitis. She also has no past medical history of  CAD (coronary artery disease); Cancer (HCC); Congestive heart failure (HCC); Diabetes; Hypertension; Infectious disease; Liver disease; Psychiatric disorder; Renal disorder; Seizure disorder (HCC); or Stroke (HCC).  MEDS:   Current Outpatient Prescriptions:   •  amoxicillin-clavulanate (AUGMENTIN) 875-125 MG Tab, Take 1 Tab by mouth 2 times a day for 10 days., Disp: 20 Tab, Rfl: 0  •  [employer] D3 5000 units Cap, Take 5,000 Units by mouth., Disp: , Rfl: 11  •  BREO ELLIPTA 100-25 MCG/INH AEROSOL POWDER, BREATH ACTIVATED, INHALE 1 PUFF BY INHALATION ROUTE EVERY DAY AT THE SAME TIME EACH DAY, Disp: , Rfl: 8  •  azithromycin (ZITHROMAX) 250 MG Tab, Take two tabs by mouth on day one, then one tab by mouth daily on days 2-5., Disp: 6 Tab, Rfl: 0  •  ALBUTEROL INH 100MG/250 ML NS, 1 Puff by Inhalation route PRN Every 4-6 prn , Disp: , Rfl:   ALLERGIES:   Allergies   Allergen Reactions   • Cefzil [Cefprozil]      nausea     SURGHX: History reviewed. No pertinent surgical history.  SOCHX:  reports that she has been smoking.  She has never used smokeless tobacco. She reports that she does not drink alcohol or use drugs.  FH: family history includes Allergies in her mother; Cancer in her mother; Diabetes in her father.       Assessment/Plan:     1. Acute non-recurrent maxillary sinusitis  - amoxicillin-clavulanate (AUGMENTIN) 875-125 MG Tab; Take 1 Tab by mouth 2 times a day for 10 days.  Dispense: 20 Tab; Refill: 0   - Complete full course of antibiotics as prescribed     Discussed use of nedi-pot, humidifier, and Flonase nasal spray for symptomatic relief. Call or return to office if symptoms persist or worsen. The patient demonstrated a good understanding and agreed with the treatment plan.      2. Onychomycosis  - REFERRAL TO PODIATRY    Patient declines imaging of great toe at today's visit. She will follow up with podiatry for further evaluation and management of onychomycosis. The patient demonstrated a good understanding  and agreed with the treatment plan.

## 2019-11-04 ENCOUNTER — HOSPITAL ENCOUNTER (EMERGENCY)
Facility: MEDICAL CENTER | Age: 59
End: 2019-11-05
Attending: EMERGENCY MEDICINE
Payer: COMMERCIAL

## 2019-11-04 ENCOUNTER — APPOINTMENT (OUTPATIENT)
Dept: RADIOLOGY | Facility: MEDICAL CENTER | Age: 59
End: 2019-11-04
Payer: COMMERCIAL

## 2019-11-04 ENCOUNTER — APPOINTMENT (OUTPATIENT)
Dept: RADIOLOGY | Facility: MEDICAL CENTER | Age: 59
End: 2019-11-04
Attending: EMERGENCY MEDICINE
Payer: COMMERCIAL

## 2019-11-04 DIAGNOSIS — R07.9 CHEST PAIN, UNSPECIFIED TYPE: ICD-10-CM

## 2019-11-04 LAB
ALBUMIN SERPL BCP-MCNC: 4.3 G/DL (ref 3.2–4.9)
ALBUMIN/GLOB SERPL: 1.6 G/DL
ALP SERPL-CCNC: 110 U/L (ref 30–99)
ALT SERPL-CCNC: 11 U/L (ref 2–50)
ANION GAP SERPL CALC-SCNC: 9 MMOL/L (ref 0–11.9)
APTT PPP: 28.3 SEC (ref 24.7–36)
AST SERPL-CCNC: 17 U/L (ref 12–45)
BASOPHILS # BLD AUTO: 1 % (ref 0–1.8)
BASOPHILS # BLD: 0.08 K/UL (ref 0–0.12)
BILIRUB SERPL-MCNC: 0.4 MG/DL (ref 0.1–1.5)
BUN SERPL-MCNC: 19 MG/DL (ref 8–22)
CALCIUM SERPL-MCNC: 9.8 MG/DL (ref 8.5–10.5)
CHLORIDE SERPL-SCNC: 106 MMOL/L (ref 96–112)
CO2 SERPL-SCNC: 27 MMOL/L (ref 20–33)
CREAT SERPL-MCNC: 0.84 MG/DL (ref 0.5–1.4)
EKG IMPRESSION: NORMAL
EOSINOPHIL # BLD AUTO: 0.58 K/UL (ref 0–0.51)
EOSINOPHIL NFR BLD: 7.1 % (ref 0–6.9)
ERYTHROCYTE [DISTWIDTH] IN BLOOD BY AUTOMATED COUNT: 44.1 FL (ref 35.9–50)
GLOBULIN SER CALC-MCNC: 2.7 G/DL (ref 1.9–3.5)
GLUCOSE SERPL-MCNC: 91 MG/DL (ref 65–99)
HCG SERPL QL: NEGATIVE
HCT VFR BLD AUTO: 43.4 % (ref 37–47)
HGB BLD-MCNC: 14 G/DL (ref 12–16)
IMM GRANULOCYTES # BLD AUTO: 0.02 K/UL (ref 0–0.11)
IMM GRANULOCYTES NFR BLD AUTO: 0.2 % (ref 0–0.9)
INR PPP: 0.98 (ref 0.87–1.13)
LYMPHOCYTES # BLD AUTO: 3.09 K/UL (ref 1–4.8)
LYMPHOCYTES NFR BLD: 37.7 % (ref 22–41)
MCH RBC QN AUTO: 30.3 PG (ref 27–33)
MCHC RBC AUTO-ENTMCNC: 32.3 G/DL (ref 33.6–35)
MCV RBC AUTO: 93.9 FL (ref 81.4–97.8)
MONOCYTES # BLD AUTO: 0.89 K/UL (ref 0–0.85)
MONOCYTES NFR BLD AUTO: 10.9 % (ref 0–13.4)
NEUTROPHILS # BLD AUTO: 3.53 K/UL (ref 2–7.15)
NEUTROPHILS NFR BLD: 43.1 % (ref 44–72)
NRBC # BLD AUTO: 0 K/UL
NRBC BLD-RTO: 0 /100 WBC
NT-PROBNP SERPL IA-MCNC: 36 PG/ML (ref 0–125)
PLATELET # BLD AUTO: 253 K/UL (ref 164–446)
PMV BLD AUTO: 10.3 FL (ref 9–12.9)
POTASSIUM SERPL-SCNC: 4.6 MMOL/L (ref 3.6–5.5)
PROT SERPL-MCNC: 7 G/DL (ref 6–8.2)
PROTHROMBIN TIME: 13.2 SEC (ref 12–14.6)
RBC # BLD AUTO: 4.62 M/UL (ref 4.2–5.4)
SODIUM SERPL-SCNC: 142 MMOL/L (ref 135–145)
TROPONIN T SERPL-MCNC: <6 NG/L (ref 6–19)
TROPONIN T SERPL-MCNC: <6 NG/L (ref 6–19)
WBC # BLD AUTO: 8.2 K/UL (ref 4.8–10.8)

## 2019-11-04 PROCEDURE — 85025 COMPLETE CBC W/AUTO DIFF WBC: CPT

## 2019-11-04 PROCEDURE — 83880 ASSAY OF NATRIURETIC PEPTIDE: CPT

## 2019-11-04 PROCEDURE — 80053 COMPREHEN METABOLIC PANEL: CPT

## 2019-11-04 PROCEDURE — 84484 ASSAY OF TROPONIN QUANT: CPT

## 2019-11-04 PROCEDURE — 85610 PROTHROMBIN TIME: CPT

## 2019-11-04 PROCEDURE — 84703 CHORIONIC GONADOTROPIN ASSAY: CPT

## 2019-11-04 PROCEDURE — 99285 EMERGENCY DEPT VISIT HI MDM: CPT

## 2019-11-04 PROCEDURE — 93005 ELECTROCARDIOGRAM TRACING: CPT | Performed by: EMERGENCY MEDICINE

## 2019-11-04 PROCEDURE — 71045 X-RAY EXAM CHEST 1 VIEW: CPT

## 2019-11-04 PROCEDURE — 71275 CT ANGIOGRAPHY CHEST: CPT

## 2019-11-04 PROCEDURE — 36415 COLL VENOUS BLD VENIPUNCTURE: CPT

## 2019-11-04 PROCEDURE — 85730 THROMBOPLASTIN TIME PARTIAL: CPT

## 2019-11-04 ASSESSMENT — LIFESTYLE VARIABLES: DO YOU DRINK ALCOHOL: NO

## 2019-11-05 VITALS
RESPIRATION RATE: 19 BRPM | OXYGEN SATURATION: 96 % | WEIGHT: 147.27 LBS | SYSTOLIC BLOOD PRESSURE: 92 MMHG | BODY MASS INDEX: 21.81 KG/M2 | HEART RATE: 62 BPM | DIASTOLIC BLOOD PRESSURE: 65 MMHG | HEIGHT: 69 IN | TEMPERATURE: 97.5 F

## 2019-11-05 RX ORDER — FAMOTIDINE 20 MG/1
20 TABLET, FILM COATED ORAL 2 TIMES DAILY
Qty: 60 TAB | Refills: 0 | Status: SHIPPED | OUTPATIENT
Start: 2019-11-05 | End: 2021-01-22

## 2019-11-05 NOTE — ED NOTES
Patient returned from CT scan via gurney with vitals monitor in place accompanied by ED RN and ICU RN.

## 2019-11-05 NOTE — ED TRIAGE NOTES
"Chief Complaint   Patient presents with   • Chest Pain     sudden onset radiating to epigastric, back x45 min     /71   Pulse 76   Temp 36.4 °C (97.5 °F) (Temporal)   Resp 18   Ht 1.753 m (5' 9\")   Wt 66.8 kg (147 lb 4.3 oz)   SpO2 100%     Pt ambulates with a steady gait to triage in obvious discomfort, with above complaints. Pt denies any cardiac hx or family hx. Pt denies N/V, diaphoresis, lightheaded. Pt report the pain is shooting through her chest into her back.   "

## 2019-11-05 NOTE — ED NOTES
Patient ambulatory from [facility] to [facility] 2 with steady gait accompanied by family and ED Tech. Chart up for ERP.

## 2019-11-05 NOTE — ED NOTES
IV access placed and additional blood drawn and sent to lab.    Patient to CT scan via gurney with vitals monitor in place accompanied by ED RN ad ICU RN.

## 2019-11-05 NOTE — ED PROVIDER NOTES
ED Provider Note    CHIEF COMPLAINT  Chief Complaint   Patient presents with   • Chest Pain     sudden onset radiating to epigastric, back x45 min       HPI  Sita Medina is a 59 y.o. female here for evaluation of chest pain and back pain.  Patient states that she had onset of chest pain that was substernal, with radiation to her back.  She states that this was ongoing for about an hour, and she decided to come in for an evaluation.  Patient has no history of radiation to her back, but she does have intermittent chest pain secondary to some reflux that she is aware of.  She has no fever no chills, no diaphoresis, and no nausea or vomiting.  Patient did not take anything for the discomfort prior to coming in, and she describes as a heaviness that radiates around to her back.  The nursing staff came in to get me to see the patient, secondary to some blood pressure discrepancy that she had a bilateral arms, so a code aorta was initially initiated.  Patient takes no anticoagulants      ROS  See HPI for further details, o/w negative.     PAST MEDICAL HISTORY   has a past medical history of ASTHMA, COPD (chronic obstructive pulmonary disease) ([facility]) (10/4/2010), COPD (chronic obstructive pulmonary disease) ([facility]) (10/4/2010), Eczema (10/4/2010), Osteopenia (10/4/2010), and Recurrent acute sinusitis.    SOCIAL HISTORY  Social History     Tobacco Use   • Smoking status: Former Smoker     Packs/day: 0.00     Last attempt to quit: 2010     Years since quittin.8   • Smokeless tobacco: Never Used   Substance and Sexual Activity   • Alcohol use: No   • Drug use: No   • Sexual activity: Yes     Partners: Male       Family History  No bleeding disorders    SURGICAL HISTORY  patient denies any surgical history    CURRENT MEDICATIONS  Home Medications     Reviewed by Geno Shi R.N. (Registered Nurse) on 19 at 2028  Med List Status: Not Addressed   Medication Last Dose Status   BREO ELLIPTA 100-25  MCG/INH AEROSOL POWDER, BREATH ACTIVATED  Active                ALLERGIES  Allergies   Allergen Reactions   • Cefzil [Cefprozil]      nausea       REVIEW OF SYSTEMS  See HPI for further details. Review of systems as above, otherwise all other systems are negative.     PHYSICAL EXAM  Constitutional: Well developed, well nourished.  Mild acute distress.  HEENT: Normocephalic, atraumatic. Posterior pharynx clear and moist.  Eyes:  EOMI. Normal sclera.  Neck: Supple, Full range of motion, nontender.  Chest/Pulmonary: clear to ausculation. Symmetrical expansion.   Cardio: Regular rate and rhythm with no murmur.   Abdomen: Soft, nontender. No peritoneal signs. No guarding. No palpable masses.  Back: No CVA tenderness, nontender midline, no step offs.  Musculoskeletal: No deformity, no edema, neurovascular intact.   Neuro: Clear speech, appropriate, cooperative, cranial nerves II-XII grossly intact.  Psych: Normal mood and affect    Results for orders placed or performed during the hospital encounter of 11/04/19   CBC with Differential   Result Value Ref Range    WBC 8.2 4.8 - 10.8 K/uL    RBC 4.62 4.20 - 5.40 M/uL    Hemoglobin 14.0 12.0 - 16.0 g/dL    Hematocrit 43.4 37.0 - 47.0 %    MCV 93.9 81.4 - 97.8 fL    MCH 30.3 27.0 - 33.0 pg    MCHC 32.3 (L) 33.6 - 35.0 g/dL    RDW 44.1 35.9 - 50.0 fL    Platelet Count 253 164 - 446 K/uL    MPV 10.3 9.0 - 12.9 fL    Neutrophils-Polys 43.10 (L) 44.00 - 72.00 %    Lymphocytes 37.70 22.00 - 41.00 %    Monocytes 10.90 0.00 - 13.40 %    Eosinophils 7.10 (H) 0.00 - 6.90 %    Basophils 1.00 0.00 - 1.80 %    Immature Granulocytes 0.20 0.00 - 0.90 %    Nucleated RBC 0.00 /100 WBC    Neutrophils (Absolute) 3.53 2.00 - 7.15 K/uL    Lymphs (Absolute) 3.09 1.00 - 4.80 K/uL    Monos (Absolute) 0.89 (H) 0.00 - 0.85 K/uL    Eos (Absolute) 0.58 (H) 0.00 - 0.51 K/uL    Baso (Absolute) 0.08 0.00 - 0.12 K/uL    Immature Granulocytes (abs) 0.02 0.00 - 0.11 K/uL    NRBC (Absolute) 0.00 K/uL    Complete Metabolic Panel (CMP)   Result Value Ref Range    Sodium 142 135 - 145 mmol/L    Potassium 4.6 3.6 - 5.5 mmol/L    Chloride 106 96 - 112 mmol/L    Co2 27 20 - 33 mmol/L    Anion Gap 9.0 0.0 - 11.9    Glucose 91 65 - 99 mg/dL    Bun 19 8 - 22 mg/dL    Creatinine 0.84 0.50 - 1.40 mg/dL    Calcium 9.8 8.5 - 10.5 mg/dL    AST(SGOT) 17 12 - 45 U/L    ALT(SGPT) 11 2 - 50 U/L    Alkaline Phosphatase 110 (H) 30 - 99 U/L    Total Bilirubin 0.4 0.1 - 1.5 mg/dL    Albumin 4.3 3.2 - 4.9 g/dL    Total Protein 7.0 6.0 - 8.2 g/dL    Globulin 2.7 1.9 - 3.5 g/dL    A-G Ratio 1.6 g/dL   Troponin   Result Value Ref Range    Troponin T <6 6 - 19 ng/L   ESTIMATED GFR   Result Value Ref Range    GFR If African American >60 >60 mL/min/1.73 m 2    GFR If Non African American >60 >60 mL/min/1.73 m 2   proBrain Natriuretic Peptide, NT   Result Value Ref Range    NT-proBNP 36 0 - 125 pg/mL   APTT (PTT)   Result Value Ref Range    APTT 28.3 24.7 - 36.0 sec   PROTHROMBIN TIME (INR)   Result Value Ref Range    PT 13.2 12.0 - 14.6 sec    INR 0.98 0.87 - 1.13   HCG QUAL SERUM   Result Value Ref Range    Beta-Hcg Qualitative Serum Negative Negative   TROPONIN   Result Value Ref Range    Troponin T <6 6 - 19 ng/L      CT-CTA COMPLETE THORACOABDOMINAL AORTA   Final Result         1.  Atherosclerosis and atherosclerotic coronary artery disease.   2.  No visualized aneurysm or dissection.   3.  Right thyroid lesion, recommend follow-up thyroid sonography due to size and complexity for further characterization.   4.  Hepatomegaly with large upper abdominal cyst, likely exophytic hepatic cyst.            DX-CHEST-PORTABLE (1 VIEW)   Final Result         1.  No acute cardiopulmonary disease.   2.  Atherosclerosis   3.  Hyperexpansion of lungs favors changes of COPD.            PROCEDURES     MEDICAL RECORD  I have reviewed patient's medical record and pertinent results are listed.    COURSE & MEDICAL DECISION MAKING  I have reviewed any medical  record information, laboratory studies and radiographic results as noted above.    If you have had any blood pressure issues while here in the emergency department, please see your doctor for a further evaluation or work up.    12:28 AM  Patient is nontoxic-appearing, afebrile, and appears comfortable.  She has no pain at this time.  She has 2 sets of negative troponins, a normal EKG, a negative CT aorta.  At this time, I will send the patient home, and she will return if any further issues or concerns.  She will follow-up in the thyroid nodule and liver cyst as noted.  She is aware of the need for further work up  Heart score of zero    EKG; normal sinus rhythm at a rate of 62.  No ST elevation, no ST depression, no ectopy.  Normal intervals.    Differential diagnoses include but not limited to: Dissection, AAA, MI    This patient presents with thyroid nodule, liver cyst and atypical chest pain.  At this time, I have counseled the patient/family regarding their medications, pain control, and follow up.  They will continue their medications, if any, as prescribed.  They will return immediately for any worsening symptoms and/or any other medical concerns.  They will see their doctor, or contact the doctor provided, in 1-2 days for follow up.       FINAL IMPRESSION  Thyroid nodule  Liver cyst  Atypical chest pain      Electronically signed by: Win Omalley, 11/5/2019 12:26 AM

## 2019-11-05 NOTE — ED NOTES
"Patient changed into gown and attached to vitals monitoring. Refusing IV access placement until after being seen by ERP.    States that pain is midsternal, nonreproducbile, radiating straight through to back. Currently rates pain as 4/10, described as \"the worset heartburn of my life that just won't go away no matter what I do.\" States pain unrelieved with Tums taken at home.    Denies headache, nausea, blurred vision, dizziness, lightheadedness.    Bilateral BP:       LUE 93/50       /54  "

## 2019-11-05 NOTE — DISCHARGE PLANNING
Date: 8/8/2023    Patient Name: Vicente Garner          To Whom it may concern: This letter has been written at the patient's request. The above patient was seen at the [facility] for treatment of a medical condition. This patient should be excused from attending work/school on 8/6/12 through 8/7/23. The patient may return to work/school on 8/8/23 with the following limitations she should not lift, push, or pull more than 15 lbs for the next 2 weeks, or until 8/21/23.          Sincerely,    Tan Mancilla, DO Medical Social Work    Referral: Code Aorta    Intervention: MSW responded to RD02 for code aorta.  Pt is Sita Medina (: 1960).  Pt's spouse, Dev Hannon ([phone] or cell [phone]) was outside of pt's room with a young grandchild.  Pt's  and grandchild were escorted to Family Support Room so child could lay down.  Child was provided with warm blankets.  Bedside RN aware of family location.    Plan: SW will follow.

## 2019-11-05 NOTE — ED NOTES
Repeat troponin drawn per orders and sent to lab.    Patient ambulatory to and from  with steady gait under constant supervision by ED RN.

## 2020-01-17 ENCOUNTER — APPOINTMENT (OUTPATIENT)
Dept: URGENT CARE | Facility: MEDICAL CENTER | Age: 60
End: 2020-01-17
Payer: COMMERCIAL

## 2020-01-17 ENCOUNTER — APPOINTMENT (OUTPATIENT)
Dept: URGENT CARE | Facility: CLINIC | Age: 60
End: 2020-01-17
Payer: COMMERCIAL

## 2020-01-18 ENCOUNTER — OFFICE VISIT (OUTPATIENT)
Dept: URGENT CARE | Facility: CLINIC | Age: 60
End: 2020-01-18
Payer: COMMERCIAL

## 2020-01-18 VITALS
DIASTOLIC BLOOD PRESSURE: 72 MMHG | WEIGHT: 153 LBS | HEART RATE: 64 BPM | BODY MASS INDEX: 22.66 KG/M2 | RESPIRATION RATE: 14 BRPM | TEMPERATURE: 98.1 F | OXYGEN SATURATION: 97 % | SYSTOLIC BLOOD PRESSURE: 126 MMHG | HEIGHT: 69 IN

## 2020-01-18 DIAGNOSIS — H69.92 ETD (EUSTACHIAN TUBE DYSFUNCTION), LEFT: ICD-10-CM

## 2020-01-18 DIAGNOSIS — B96.89 ACUTE BACTERIAL RHINOSINUSITIS: ICD-10-CM

## 2020-01-18 DIAGNOSIS — J01.90 ACUTE BACTERIAL RHINOSINUSITIS: ICD-10-CM

## 2020-01-18 PROCEDURE — 99214 OFFICE O/P EST MOD 30 MIN: CPT | Performed by: PHYSICIAN ASSISTANT

## 2020-01-18 RX ORDER — AMOXICILLIN 500 MG/1
500 CAPSULE ORAL 2 TIMES DAILY
Qty: 20 CAP | Refills: 0 | Status: SHIPPED | OUTPATIENT
Start: 2020-01-18 | End: 2020-01-28

## 2020-01-19 NOTE — PROGRESS NOTES
Subjective:      Sita Medina is a 59 y.o. female who presents with Otalgia ((L) ear ache x 4 days)            HPI  59-year-old female presents to urgent care with new problem of sinus pressure, nasal drainage, and left ear pain onset 4 days ago.  Patient has taken over-the-counter cold medications with minimal symptomatic relief.  She denies fevers, body aches, or chills.  Patient reports history of similar with sinus infections in the past.  She denies sick contacts. Denies other associated aggravating or alleviating factors.       Review of Systems   Constitutional: Negative for chills, fever and malaise/fatigue.   HENT: Positive for congestion, ear pain and sinus pain. Negative for ear discharge, sore throat and tinnitus.    Eyes: Negative for pain and redness.   Respiratory: Positive for cough and sputum production. Negative for shortness of breath and wheezing.    Cardiovascular: Negative for chest pain and palpitations.   Gastrointestinal: Negative for abdominal pain, diarrhea, nausea and vomiting.   Genitourinary: Negative for dysuria.   Musculoskeletal: Negative for myalgias and neck pain.   Skin: Negative for rash.   Neurological: Positive for headaches. Negative for dizziness.   Endo/Heme/Allergies: Negative for environmental allergies.       Past Medical History:   Diagnosis Date   • ASTHMA    • COPD (chronic obstructive pulmonary disease) ([facility]) 10/4/2010   • COPD (chronic obstructive pulmonary disease) ([facility]) 10/4/2010   • Eczema 10/4/2010   • Osteopenia 10/4/2010   • Recurrent acute sinusitis      Current Outpatient Medications on File Prior to Visit   Medication Sig Dispense Refill   • famotidine (PEPCID) 20 MG Tab Take 1 Tab by mouth 2 times a day. 60 Tab 0   • BREO ELLIPTA 100-25 MCG/INH AEROSOL POWDER, BREATH ACTIVATED INHALE 1 PUFF BY INHALATION ROUTE EVERY DAY AT THE SAME TIME EACH DAY  8     No current facility-administered medications on file prior to visit.      Allergies   Allergen  "Reactions   • Cefzil [Cefprozil]      nausea     Social History     Tobacco Use   • Smoking status: Former Smoker     Packs/day: 0.00     Last attempt to quit: 1/4/2010     Years since quitting: 10.0   • Smokeless tobacco: Never Used   Substance Use Topics   • Alcohol use: No      Objective:     /72 (BP Location: Left arm, Patient Position: Sitting, BP Cuff Size: Adult)   Pulse 64   Temp 36.7 °C (98.1 °F) (Temporal)   Resp 14   Ht 1.753 m (5' 9\")   Wt 69.4 kg (153 lb)   LMP 04/02/2008   SpO2 97%   BMI 22.59 kg/m²      Physical Exam  Vitals signs reviewed.   Constitutional:       General: She is not in acute distress.     Appearance: Normal appearance. She is well-developed. She is not ill-appearing.   HENT:      Head: Normocephalic and atraumatic.      Right Ear: Tympanic membrane, ear canal and external ear normal.      Left Ear: Tympanic membrane, ear canal and external ear normal.      Nose: Mucosal edema, congestion and rhinorrhea present.      Right Sinus: Maxillary sinus tenderness and frontal sinus tenderness present.      Left Sinus: Maxillary sinus tenderness and frontal sinus tenderness present.      Mouth/Throat:      Pharynx: Posterior oropharyngeal erythema present. No oropharyngeal exudate.   Eyes:      Extraocular Movements: Extraocular movements intact.      Conjunctiva/sclera: Conjunctivae normal.   Neck:      Musculoskeletal: Normal range of motion and neck supple.   Cardiovascular:      Rate and Rhythm: Normal rate and regular rhythm.      Pulses: Normal pulses.      Heart sounds: Normal heart sounds.   Pulmonary:      Effort: Pulmonary effort is normal. No respiratory distress.      Breath sounds: Normal breath sounds.   Musculoskeletal: Normal range of motion.   Lymphadenopathy:      Cervical: No cervical adenopathy.   Skin:     General: Skin is warm and dry.   Neurological:      General: No focal deficit present.      Mental Status: She is alert and oriented to person, place, and " time.   Psychiatric:         Mood and Affect: Mood normal.         Behavior: Behavior normal.         Thought Content: Thought content normal.         Judgment: Judgment normal.                 Assessment/Plan:     1. Acute bacterial rhinosinusitis  amoxicillin (AMOXIL) 500 MG Cap   2. ETD (Eustachian tube dysfunction), left       PT should follow up with PCP in 1-2 days for re-evaluation if symptoms have not improved.  Discussed red flags and reasons to return to UC or ED.  Pt and/or family verbalized understanding of diagnosis and follow up instructions and was offered informational handout on diagnosis.  PT discharged.

## 2020-01-21 ASSESSMENT — ENCOUNTER SYMPTOMS
COUGH: 1
SORE THROAT: 0
MYALGIAS: 0
HEADACHES: 1
SHORTNESS OF BREATH: 0
DIZZINESS: 0
NECK PAIN: 0
EYE PAIN: 0
CHILLS: 0
FEVER: 0
PALPITATIONS: 0
SPUTUM PRODUCTION: 1
WHEEZING: 0
ABDOMINAL PAIN: 0
EYE REDNESS: 0
NAUSEA: 0
SINUS PAIN: 1
DIARRHEA: 0
VOMITING: 0

## 2020-03-18 ENCOUNTER — OFFICE VISIT (OUTPATIENT)
Dept: URGENT CARE | Facility: CLINIC | Age: 60
End: 2020-03-18
Payer: COMMERCIAL

## 2020-03-18 VITALS
WEIGHT: 148 LBS | RESPIRATION RATE: 20 BRPM | TEMPERATURE: 98.2 F | HEART RATE: 72 BPM | DIASTOLIC BLOOD PRESSURE: 80 MMHG | SYSTOLIC BLOOD PRESSURE: 122 MMHG | OXYGEN SATURATION: 96 % | HEIGHT: 69 IN | BODY MASS INDEX: 21.92 KG/M2

## 2020-03-18 DIAGNOSIS — J01.40 ACUTE NON-RECURRENT PANSINUSITIS: Primary | ICD-10-CM

## 2020-03-18 PROBLEM — M25.579 TOE JOINT PAIN: Status: ACTIVE | Noted: 2019-04-24

## 2020-03-18 PROCEDURE — 99214 OFFICE O/P EST MOD 30 MIN: CPT | Performed by: PHYSICIAN ASSISTANT

## 2020-03-18 RX ORDER — FLUTICASONE FUROATE 200 UG/1
POWDER RESPIRATORY (INHALATION)
COMMUNITY
Start: 2020-03-16 | End: 2021-01-22

## 2020-03-18 RX ORDER — PANTOPRAZOLE SODIUM 40 MG/1
TABLET, DELAYED RELEASE ORAL
COMMUNITY
Start: 2020-03-17 | End: 2021-01-22

## 2020-03-18 RX ORDER — AMOXICILLIN AND CLAVULANATE POTASSIUM 875; 125 MG/1; MG/1
1 TABLET, FILM COATED ORAL 2 TIMES DAILY
Qty: 14 TAB | Refills: 0 | Status: SHIPPED | OUTPATIENT
Start: 2020-03-18 | End: 2020-03-25

## 2020-03-18 ASSESSMENT — ENCOUNTER SYMPTOMS
SHORTNESS OF BREATH: 0
SORE THROAT: 0
VOMITING: 0
FEVER: 0
RHINORRHEA: 1
DIARRHEA: 0
CHILLS: 0
COUGH: 0
ABDOMINAL PAIN: 0
SINUS PAIN: 1
NAUSEA: 0
CONSTIPATION: 0

## 2020-03-18 ASSESSMENT — FIBROSIS 4 INDEX: FIB4 SCORE: 1.24

## 2020-03-18 NOTE — PROGRESS NOTES
Subjective:   Sita Medina is a 59 y.o. female who presents for Otalgia (Couple days sinus headache, earaches.)        Otalgia    There is pain in both ears. This is a new problem. Episode onset: 2 days  The problem occurs constantly. There has been no fever. Associated symptoms include hearing loss and rhinorrhea. Pertinent negatives include no abdominal pain, coughing, diarrhea, sore throat or vomiting. She has tried NSAIDs for the symptoms. The treatment provided mild relief. There is no history of a chronic ear infection or hearing loss.     No ill contacts. Pt received flu shot.     Review of Systems   Constitutional: Negative for chills, fever and malaise/fatigue.   HENT: Positive for congestion, ear pain, hearing loss, rhinorrhea and sinus pain. Negative for sore throat.    Respiratory: Negative for cough and shortness of breath.    Gastrointestinal: Negative for abdominal pain, constipation, diarrhea, nausea and vomiting.   All other systems reviewed and are negative.      PMH:  has a past medical history of ASTHMA, COPD (chronic obstructive pulmonary disease) ([facility]) (10/4/2010), COPD (chronic obstructive pulmonary disease) ([facility]) (10/4/2010), Eczema (10/4/2010), Osteopenia (10/4/2010), and Recurrent acute sinusitis. She also has no past medical history of CAD (coronary artery disease), Cancer ([facility]), Congestive heart failure ([facility]), Diabetes, Hypertension, Infectious disease, Liver disease, Psychiatric disorder, Renal disorder, Seizure disorder ([facility]), or Stroke ([facility]).  MEDS:   Current Outpatient Medications:   •  ARNUITY ELLIPTA 200 MCG/ACT AEROSOL POWDER, BREATH ACTIVATED, , Disp: , Rfl:   •  pantoprazole (PROTONIX) 40 MG Tablet Delayed Response, , Disp: , Rfl:   •  amoxicillin-clavulanate (AUGMENTIN) 875-125 MG Tab, Take 1 Tab by mouth 2 times a day for 7 days., Disp: 14 Tab, Rfl: 0  •  famotidine (PEPCID) 20 MG Tab, Take 1 Tab by mouth 2 times a day. (Patient not taking: Reported on 3/18/2020),  "Disp: 60 Tab, Rfl: 0  •  BREO ELLIPTA 100-25 MCG/INH AEROSOL POWDER, BREATH ACTIVATED, INHALE 1 PUFF BY INHALATION ROUTE EVERY DAY AT THE SAME TIME EACH DAY, Disp: , Rfl: 8  ALLERGIES:   Allergies   Allergen Reactions   • Cefzil [Cefprozil]      nausea     SURGHX: History reviewed. No pertinent surgical history.  SOCHX:  reports that she quit smoking about 10 years ago. She smoked 0.00 packs per day. She has never used smokeless tobacco. She reports that she does not drink alcohol or use drugs.  Family History   Problem Relation Age of Onset   • Cancer Mother         breast   • Allergies Mother    • Diabetes Father         Objective:   /80   Pulse 72   Temp 36.8 °C (98.2 °F)   Resp 20   Ht 1.753 m (5' 9\")   Wt 67.1 kg (148 lb)   LMP 04/02/2008   SpO2 96%   BMI 21.86 kg/m²     Physical Exam  Vitals signs reviewed.   Constitutional:       General: She is not in acute distress.     Appearance: She is well-developed.   HENT:      Head: Normocephalic and atraumatic.      Right Ear: External ear normal. Tympanic membrane is erythematous.      Left Ear: External ear normal. Tympanic membrane is erythematous.      Nose: Nasal tenderness, mucosal edema and congestion present.      Mouth/Throat:      Mouth: Mucous membranes are moist.      Pharynx: Oropharynx is clear. Uvula midline.      Tonsils: No tonsillar exudate or tonsillar abscesses. 1+ on the right. 1+ on the left.   Eyes:      Conjunctiva/sclera: Conjunctivae normal.      Pupils: Pupils are equal, round, and reactive to light.   Neck:      Musculoskeletal: Normal range of motion and neck supple.      Trachea: No tracheal deviation.   Cardiovascular:      Rate and Rhythm: Normal rate and regular rhythm.   Pulmonary:      Effort: Pulmonary effort is normal. No respiratory distress.      Breath sounds: Normal breath sounds. No wheezing, rhonchi or rales.   Skin:     General: Skin is warm and dry.      Capillary Refill: Capillary refill takes less than 2 " seconds.   Neurological:      General: No focal deficit present.      Mental Status: She is alert and oriented to person, place, and time.   Psychiatric:         Mood and Affect: Mood normal.         Behavior: Behavior normal.           Assessment/Plan:     1. Acute non-recurrent pansinusitis  amoxicillin-clavulanate (AUGMENTIN) 875-125 MG Tab     Start OTC decongestant, nasal saline rinse.  Sinusitis handout provided.    Follow-up with primary care provider within 7-10 days.  If symptoms worsen or persist patient can return to clinic for reevaluation. All side effects of medication discussed including allergic response, GI upset, tendon injury, etc. Patient confirmed understanding of information.    Please note that this dictation was created using voice recognition software. I have made every reasonable attempt to correct obvious errors, but I expect that there are errors of grammar and possibly content that I did not discover before finalizing the note.

## 2020-03-18 NOTE — PATIENT INSTRUCTIONS

## 2020-11-30 ENCOUNTER — TELEPHONE (OUTPATIENT)
Dept: SCHEDULING | Facility: IMAGING CENTER | Age: 60
End: 2020-11-30

## 2021-01-22 ENCOUNTER — TELEMEDICINE (OUTPATIENT)
Dept: MEDICAL GROUP | Facility: MEDICAL CENTER | Age: 61
End: 2021-01-22
Payer: COMMERCIAL

## 2021-01-22 VITALS — HEIGHT: 69 IN | HEART RATE: 90 BPM | OXYGEN SATURATION: 95 % | BODY MASS INDEX: 21.48 KG/M2 | WEIGHT: 145 LBS

## 2021-01-22 DIAGNOSIS — M81.0 OSTEOPOROSIS, UNSPECIFIED OSTEOPOROSIS TYPE, UNSPECIFIED PATHOLOGICAL FRACTURE PRESENCE: ICD-10-CM

## 2021-01-22 DIAGNOSIS — J44.9 CHRONIC OBSTRUCTIVE PULMONARY DISEASE, UNSPECIFIED COPD TYPE (HCC): ICD-10-CM

## 2021-01-22 DIAGNOSIS — M85.80 OSTEOPENIA, UNSPECIFIED LOCATION: ICD-10-CM

## 2021-01-22 DIAGNOSIS — Z11.59 NEED FOR HEPATITIS C SCREENING TEST: ICD-10-CM

## 2021-01-22 DIAGNOSIS — K76.89 LIVER CYST: ICD-10-CM

## 2021-01-22 DIAGNOSIS — Z12.31 ENCOUNTER FOR SCREENING MAMMOGRAM FOR MALIGNANT NEOPLASM OF BREAST: ICD-10-CM

## 2021-01-22 DIAGNOSIS — J01.01 ACUTE RECURRENT MAXILLARY SINUSITIS: ICD-10-CM

## 2021-01-22 PROBLEM — M25.579 TOE JOINT PAIN: Status: RESOLVED | Noted: 2019-04-24 | Resolved: 2021-01-22

## 2021-01-22 PROCEDURE — 99213 OFFICE O/P EST LOW 20 MIN: CPT | Performed by: FAMILY MEDICINE

## 2021-01-22 RX ORDER — BUDESONIDE AND FORMOTEROL FUMARATE DIHYDRATE 160; 4.5 UG/1; UG/1
1 AEROSOL RESPIRATORY (INHALATION) 2 TIMES DAILY
COMMUNITY
Start: 2021-01-11 | End: 2023-07-26 | Stop reason: SDUPTHER

## 2021-01-22 RX ORDER — AMOXICILLIN AND CLAVULANATE POTASSIUM 875; 125 MG/1; MG/1
1 TABLET, FILM COATED ORAL 2 TIMES DAILY
Qty: 14 TAB | Refills: 0 | Status: SHIPPED | OUTPATIENT
Start: 2021-01-22 | End: 2021-01-29

## 2021-01-22 RX ORDER — CHOLECALCIFEROL (VITAMIN D3) 50 MCG
TABLET ORAL DAILY
COMMUNITY
End: 2022-01-05

## 2021-01-22 SDOH — HEALTH STABILITY: MENTAL HEALTH: HOW OFTEN DO YOU HAVE A DRINK CONTAINING ALCOHOL?: NEVER

## 2021-01-22 ASSESSMENT — PATIENT HEALTH QUESTIONNAIRE - PHQ9: CLINICAL INTERPRETATION OF PHQ2 SCORE: 0

## 2021-01-22 ASSESSMENT — FIBROSIS 4 INDEX: FIB4 SCORE: 1.26

## 2021-01-22 NOTE — PROGRESS NOTES
Virtual Visit: New Patient     This evaluation was conducted via Zoom using secure and encrypted videoconferencing technology. The patient was in a private location in the state of [place].   The patient's identity was confirmed and verbal consent was obtained for this virtual visit.      Subjective:     CC:   Sita Medina is a 60 y.o. female presenting to establish care.  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  She had a colonoscopy about 2 months ago.  She is due for a DEXA scan, mammogram, and Pap smear.    Liver cyst  The patient reports she was diagnosed with a cyst in her liver about a year ago. She follows with GI and is scheduled to have an appointment with Dr. Rubi.      COPD (chronic obstructive pulmonary disease)  This is a chronic problem.  The patient reports her symptoms are well controlled with Symbicort.  She follows with Jeremiah Soto MD, pulmonologist.    Sinusitis  The patient reports recurrent sinus infections. He reports left-sided sinus pressure, teeth tenderness, and left ear pain X 1 month. She denies fever, chills, sore throat, shortness of breath, cough, or congestion. She reports she has seen an ENT previously. Her sinus affections have decreased in frequency since she stopped smoking.  She is using a nasal saline rinse daily.    Osteopenia  This is a chronic problem.  Patient reports history of osteopenia and reports she is due for DEXA scan.      ROS  See HPI  Constitutional: Negative for fever, chills and malaise/fatigue.   HENT: Negative for congestion.    Eyes: Negative for pain.   Respiratory: Negative for cough and shortness of breath.    Cardiovascular: Negative for leg swelling.   Gastrointestinal: Negative for nausea, vomiting, abdominal pain and diarrhea.   Genitourinary: Negative for dysuria and hematuria.   Skin: Negative for rash.   Neurological: Negative for dizziness, focal weakness and headaches.  "  Endo/Heme/Allergies: Does not bruise/bleed easily.   Psychiatric/Behavioral: Negative for depression.  The patient is not nervous/anxious.      Allergies   Allergen Reactions   • Cefzil [Cefprozil]      nausea       Current medicines (including changes today)  Current Outpatient Medications   Medication Sig Dispense Refill   • SYMBICORT 160-4.5 MCG/ACT Aerosol      • Cholecalciferol (VITAMIN D) 2000 UNIT Tab Take  by mouth every day.     • amoxicillin-clavulanate (AUGMENTIN) 875-125 MG Tab Take 1 Tab by mouth 2 times a day for 7 days. 14 Tab 0     No current facility-administered medications for this visit.        She  has a past medical history of ASTHMA, COPD (chronic obstructive pulmonary disease) ([facility]) (10/4/2010), COPD (chronic obstructive pulmonary disease) ([facility]) (10/4/2010), Eczema (10/4/2010), Osteopenia (10/4/2010), and Recurrent acute sinusitis. She also has no past medical history of CAD (coronary artery disease), Cancer ([facility]), Congestive heart failure ([facility]), Diabetes, Hypertension, Infectious disease, Liver disease, Psychiatric disorder, Renal disorder, Seizure disorder ([facility]), or Stroke ([facility]).  She  has no past surgical history on file.      Family History   Problem Relation Age of Onset   • Cancer Mother         breast   • Allergies Mother    • Diabetes Father    • Cancer Brother         esophageal cancer     Family Status   Relation Name Status   • Mo  Alive   • Fa  Alive   • Bro  Alive   • Bro  Alive       Patient Active Problem List    Diagnosis Date Noted   • Liver cyst 01/22/2021   • Sinusitis 10/04/2010   • COPD (chronic obstructive pulmonary disease) ([facility]) 10/04/2010   • Osteopenia 10/04/2010          Objective:   Vitals obtained by patient:  Pulse 90   Ht 1.753 m (5' 9\")   Wt 65.8 kg (145 lb)   LMP 04/02/2008   SpO2 95%   BMI 21.41 kg/m²     Physical Exam:  Constitutional: Alert, no distress, well-groomed.  Skin: No rashes in visible areas.  Eye: Round. Conjunctiva clear, lids normal. No " icterus.   ENMT: Lips pink without lesions, good dentition, moist mucous membranes. Phonation normal.  CV: Pulse as reported by patient  Respiratory: Unlabored respiratory effort, no cough or audible wheeze  Psych: Alert and oriented x3, normal affect and mood.       Assessment and Plan:   The following treatment plan was discussed:     1. Liver cyst  Patient following with GI, has appointment scheduled to discuss surgical options.  - Lipid Profile; Future  - Comp Metabolic Panel; Future  - CBC WITH DIFFERENTIAL; Future    2. Osteoporosis, unspecified osteoporosis type, unspecified pathological fracture presence  This is a chronic problem. Patient is due for DEXA scan. Will discuss appropriate tx pending results.  - DS-BONE DENSITY STUDY (DEXA); Future  - Comp Metabolic Panel; Future    3. Encounter for screening mammogram for malignant neoplasm of breast  - MA-SCREENING MAMMO BILAT W/TOMOSYNTHESIS W/CAD; Future    4. Chronic obstructive pulmonary disease, unspecified COPD type ([facility])  This is a chronic, stable problem.  Patient is working with pulmonology.  Symptoms are well controlled with Symbicort.  She will continue her current regimen.  - CBC WITH DIFFERENTIAL; Future    5. Need for hepatitis C screening test  - HCV Scrn ( [id number] 1xLife); Future    6. Acute recurrent maxillary sinusitis  This is a new problem.  Patient reports recurrent sinus infections. Discussed trial of conservative therapy with continued nasal saline rinses and flonase; patient declines as she has had symptoms for a month.  - Augmentin X 7 days        Follow-up: Return in about 1 month (around 2021) for Pap.       Please note this dictation was created using voice recognition software. I have made every reasonable attempt to correct obvious errors, but I expect there may be errors of grammar, and possibly content, that I did not discover before finalizing the note.

## 2021-01-22 NOTE — LETTER
[facility]  Merle Gore M.D.  [address]  [facility] [address]  Fax: [phone]   Authorization for Release/Disclosure of   Protected Health Information   Name: BARTOLO LAGUNAS : 1960 SSN: [id number]   [place] [facility] [address] Phone:    [phone] (home) [phone] (work)   I authorize the entity listed below to release/disclose the PHI below to:   [facility]/Merle Gore M.D. and Merle Gore M.D.   Provider or Entity Name:Dr.Angelica Pearce     Address   City, State, Zip   Phone:[phone]      Fax:[phone]     Reason for request: continuity of care   Information to be released:    [  ] LAST COLONOSCOPY,  including any PATH REPORT and follow-up  [  ] LAST FIT/COLOGUARD RESULT [  ] LAST DEXA  [  ] LAST MAMMOGRAM  [  ] LAST PAP  [  ] LAST LABS [  ] RETINA EXAM REPORT  [  ] IMMUNIZATION RECORDS  [ xxx ] Release all info      [  ] Check here and initial the line next to each item to release ALL health information INCLUDING  _____ Care and treatment for drug and / or alcohol abuse  _____ HIV testing, infection status, or AIDS  _____ Genetic Testing    DATES OF SERVICE OR TIME PERIOD TO BE DISCLOSED: _____________  I understand and acknowledge that:  * This Authorization may be revoked at any time by you in writing, except if your health information has already been used or disclosed.  * Your health information that will be used or disclosed as a result of you signing this authorization could be re-disclosed by the recipient. If this occurs, your re-disclosed health information may no longer be protected by State or Federal laws.  * You may refuse to sign this Authorization. Your refusal will not affect your ability to obtain treatment.  * This Authorization becomes effective upon signing and will  on (date) __________.      If no date is indicated, this Authorization will  one (1) year from the signature date.    Name:  Sita Medina    Signature:Continuation of Care   Date:     1/22/2021       PLEASE FAX REQUESTED RECORDS BACK TO: [phone]

## 2021-01-22 NOTE — ASSESSMENT & PLAN NOTE
The patient reports recurrent sinus infections. He reports left-sided sinus pressure, teeth tenderness, and left ear pain X 1 month. She denies fever, chills, sore throat, shortness of breath, cough, or congestion. She reports she has seen an ENT previously. Her sinus affections have decreased in frequency since she stopped smoking.  She is using a nasal saline rinse daily.

## 2021-01-22 NOTE — ASSESSMENT & PLAN NOTE
The patient reports she was diagnosed with a cyst in her liver about a year ago. She follows with GI and is scheduled to have an appointment with Dr. Rubi.

## 2021-01-22 NOTE — ASSESSMENT & PLAN NOTE
This is a chronic problem.  The patient reports her symptoms are well controlled with Symbicort.  She follows with Jeremiah Soto MD, pulmonologist.

## 2021-01-22 NOTE — LETTER
[facility]  Merle Gore M.D.  [address]  [facility] [address]  Fax: [phone]   Authorization for Release/Disclosure of   Protected Health Information   Name: BARTOLO LAGUNAS : 1960 SSN: [id number]   [place] [place] [facility] [place] [facility] [address] Phone:    [phone] (home) [phone] (work)   I authorize the entity listed below to release/disclose the PHI below to:   [facility]/Merle Gore M.D. and Merle Gore M.D.   Provider or Entity Name:[facility]     Address   City, [facility]   Phone:[phone]      Fax:[phone]     Reason for request: continuity of care   Information to be released:    [ xxx ] LAST COLONOSCOPY,  including any PATH REPORT and follow-up  [  ] LAST FIT/COLOGUARD RESULT [  ] LAST DEXA  [  ] LAST MAMMOGRAM  [  ] LAST PAP  [ xxx ] LAST LABS [  ] RETINA EXAM REPORT  [  ] IMMUNIZATION RECORDS  [  ] Release all info      [  ] Check here and initial the line next to each item to release ALL health information INCLUDING  _____ Care and treatment for drug and / or alcohol abuse  _____ HIV testing, infection status, or AIDS  _____ Genetic Testing    DATES OF SERVICE OR TIME PERIOD TO BE DISCLOSED: _____________  I understand and acknowledge that:  * This Authorization may be revoked at any time by you in writing, except if your health information has already been used or disclosed.  * Your health information that will be used or disclosed as a result of you signing this authorization could be re-disclosed by the recipient. If this occurs, your re-disclosed health information may no longer be protected by State or Federal laws.  * You may refuse to sign this Authorization. Your refusal will not affect your ability to obtain treatment.  * This Authorization becomes effective upon signing and will  on (date) __________.      If no date is indicated, this Authorization will  one (1) year from the signature date.    Name:  Sita Medina    Signature:Continaution of Care   Date:     1/22/2021       PLEASE FAX REQUESTED RECORDS BACK TO: [phone]

## 2021-01-22 NOTE — ASSESSMENT & PLAN NOTE
This is a chronic problem.  Patient reports history of osteopenia and reports she is due for DEXA scan.

## 2021-02-01 NOTE — PROGRESS NOTES
Subjective:   2/2/2021  8:27 AM  Primary care physician:Merle Gore M.D.  Referring Provider: Macie Pillai MD       Chief Complaint:   Chief Complaint   Patient presents with   • New Patient     NP LARGE LIVER CYST REF DR SALDIVAR     Diagnosis:   1. Liver cyst     2. Abdominal pain, unspecified abdominal location     3. Abnormal CT of the abdomen         History of presenting illness:  Sita Medina  is a pleasant 60 y.o. year old female who presented with what appears to be an large cyst in the left lateral segment of the liver compressing her stomach.  The patient states that she has had some discomfort and in the epigastric region.  This led to a work-up including a CT scan which showed a large cyst in the right lateral segment measuring approximate 11 cm in size.  It is simple with no ring enhancement.  There is no septations and no nodules.  The patient also has gallstones but I do not have any ultrasound that indicates she has gallstones this is per the H&P from her gastroenterologist.  Patient it was a smoker but stopped to have years ago she has never been a drinker.  She does have chronic reflux disease and does take omeprazole.  Her personally reviewed along with her CT scan from [facility] shows that she has some reflux but no dysplasia or Garcia's esophagitis.  Her brother is suffering from esophagus cancer related to reflux.  The patient has not had a laparotomy.  She denies any fever or chills, nausea or vomiting.  She has had no malignancies or history of alcohol abuse or cirrhosis.  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  I have also personally reviewed her pathology report from her EGD with her gastroenterologist.      Past Medical History:   Diagnosis Date   • ASTHMA    • COPD (chronic obstructive pulmonary disease) (HCC) 10/4/2010   • COPD (chronic obstructive pulmonary disease)  ([facility]) 10/4/2010   • Eczema 10/4/2010   • Osteopenia 10/4/2010   • Recurrent acute sinusitis      No past surgical history on file.  Allergies   Allergen Reactions   • Cefzil [Cefprozil]      nausea     Outpatient Encounter Medications as of 2/2/2021   Medication Sig Dispense Refill   • pantoprazole (PROTONIX) 40 MG Tablet Delayed Response Take 40 mg by mouth every day.     • SYMBICORT 160-4.5 MCG/ACT Aerosol      • Cholecalciferol (VITAMIN D) 2000 UNIT Tab Take  by mouth every day.       No facility-administered encounter medications on file as of 2/2/2021.      Social History     Socioeconomic History   • Marital status:      Spouse name: Not on file   • Number of children: Not on file   • Years of education: Not on file   • Highest education level: Not on file   Occupational History   • Not on file   Social Needs   • Financial resource strain: Not on file   • Food insecurity     Worry: Not on file     Inability: Not on file   • Transportation needs     Medical: Not on file     Non-medical: Not on file   Tobacco Use   • Smoking status: Former Smoker     Packs/day: 0.00     Years: 15.00     Pack years: 0.00     Types: Cigarettes   • Smokeless tobacco: Never Used   Substance and Sexual Activity   • Alcohol use: Never     Frequency: Never   • Drug use: No   • Sexual activity: Yes     Partners: Male   Lifestyle   • Physical activity     Days per week: Not on file     Minutes per session: Not on file   • Stress: Not on file   Relationships   • Social connections     Talks on phone: Not on file     Gets together: Not on file     Attends Islam service: Not on file     Active member of club or organization: Not on file     Attends meetings of clubs or organizations: Not on file     Relationship status: Not on file   • Intimate partner violence     Fear of current or ex partner: Not on file     Emotionally abused: Not on file     Physically abused: Not on file     Forced sexual activity: Not on file   Other  "Topics Concern   • Not on file   Social History Narrative   • Not on file      Social History     Tobacco Use   Smoking Status Former Smoker   • Packs/day: 0.00   • Years: 15.00   • Pack years: 0.00   • Types: Cigarettes   Smokeless Tobacco Never Used     Social History     Substance and Sexual Activity   Alcohol Use Never   • Frequency: Never     Social History     Substance and Sexual Activity   Drug Use No        Family History   Problem Relation Age of Onset   • Cancer Mother         breast   • Allergies Mother    • Diabetes Father    • Cancer Brother         esophageal cancer       Review of Systems   HENT: Positive for sinus pain.    Respiratory: Positive for shortness of breath.    Cardiovascular: Positive for palpitations.   Gastrointestinal: Positive for abdominal pain.   All other systems reviewed and are negative.       Objective:   /74 (BP Location: Left arm, Patient Position: Sitting, BP Cuff Size: Adult)   Pulse 95   Temp 36.3 °C (97.3 °F) (Temporal)   Ht 1.74 m (5' 8.5\")   Wt 67.1 kg (148 lb)   LMP 04/02/2008   SpO2 95%   BMI 22.18 kg/m²     Physical Exam   Constitutional: She is oriented to person, place, and time. She appears well-developed and well-nourished.   HENT:   Head: Normocephalic and atraumatic.   Eyes: Pupils are equal, round, and reactive to light. Conjunctivae are normal.   Neck: Normal range of motion. Neck supple.   Cardiovascular: Normal rate and regular rhythm.   Pulmonary/Chest: Effort normal and breath sounds normal.   Abdominal: Soft. Bowel sounds are normal. There is abdominal tenderness.   Mild tenderness in the right upper quadrant after the palpation over the gallbladder   Musculoskeletal: Normal range of motion.   Neurological: She is alert and oriented to person, place, and time.   Skin: Skin is warm and dry.   Psychiatric: She has a normal mood and affect. Her behavior is normal. Judgment and thought content normal.   Nursing note and vitals " reviewed.      Labs: 20        Imagin20 [facility]  Per my read,     Impression:  Large simple appearing cyst in the left hepatic lobe likely represents a benign hepatic cyst versus biliary cystadenoma.    Pathology:  NA    Procedures:  NA    Diagnosis:     1. Liver cyst     2. Abdominal pain, unspecified abdominal location     3. Abnormal CT of the abdomen             Medical Decision Making:  Today's Assessment / Status / Plan:     In light of the present findings, the patient has elected not to proceed with a marsupialization of the simple liver cyst even though it is compressing her stomach.  She has no pain at this time but she did have some discomfort in the right upper quadrant when examined.  This may indicate some gallbladder issues.  I instructed the patient if she develops any small symptoms in the abdomen to contact her gastroenterologist will work her up and possibly send her back to us.  There is no need to do surveillance on this this time unless it is symptomatic.    I, Dr. Rubi have entered, reviewed and confirmed the above diagnoses related to this patient on this date of service, 2021  8:27 AM.    She agreed and verbalized her agreement and understanding with the current plan. I answered all questions and concerns she has at this time and advised her to call at any time in the interim with questions or concerns in regards to her care.    Thank you for allowing me to participate in her care, I will continue to follow closely.       Please note that this dictation was created using voice recognition software. I have made every reasonable attempt to correct obvious errors, but I expect that there are errors of grammar and possibly content that I did not discover before finalizing the note.     Thank you for this consultation and allowing me to participate in your patient's care. If I can be of further service please contact my office.

## 2021-02-02 ENCOUNTER — OFFICE VISIT (OUTPATIENT)
Dept: SURGICAL ONCOLOGY | Facility: MEDICAL CENTER | Age: 61
End: 2021-02-02
Payer: COMMERCIAL

## 2021-02-02 VITALS
HEIGHT: 69 IN | SYSTOLIC BLOOD PRESSURE: 122 MMHG | HEART RATE: 95 BPM | DIASTOLIC BLOOD PRESSURE: 74 MMHG | WEIGHT: 148 LBS | TEMPERATURE: 97.3 F | OXYGEN SATURATION: 95 % | BODY MASS INDEX: 21.92 KG/M2

## 2021-02-02 DIAGNOSIS — R10.9 ABDOMINAL PAIN, UNSPECIFIED ABDOMINAL LOCATION: ICD-10-CM

## 2021-02-02 DIAGNOSIS — K76.89 LIVER CYST: ICD-10-CM

## 2021-02-02 DIAGNOSIS — R93.5 ABNORMAL CT OF THE ABDOMEN: ICD-10-CM

## 2021-02-02 PROCEDURE — 99204 OFFICE O/P NEW MOD 45 MIN: CPT | Performed by: SURGERY

## 2021-02-02 RX ORDER — PANTOPRAZOLE SODIUM 40 MG/1
40 TABLET, DELAYED RELEASE ORAL DAILY
COMMUNITY
End: 2021-08-03

## 2021-02-02 ASSESSMENT — FIBROSIS 4 INDEX: FIB4 SCORE: 1.26

## 2021-02-02 ASSESSMENT — ENCOUNTER SYMPTOMS
SHORTNESS OF BREATH: 1
PALPITATIONS: 1
SINUS PAIN: 1
ABDOMINAL PAIN: 1

## 2021-03-15 DIAGNOSIS — Z23 NEED FOR VACCINATION: ICD-10-CM

## 2021-03-24 ENCOUNTER — HOSPITAL ENCOUNTER (OUTPATIENT)
Dept: RADIOLOGY | Facility: MEDICAL CENTER | Age: 61
End: 2021-03-24
Payer: COMMERCIAL

## 2021-03-25 ENCOUNTER — HOSPITAL ENCOUNTER (OUTPATIENT)
Dept: LAB | Facility: MEDICAL CENTER | Age: 61
End: 2021-03-25
Attending: FAMILY MEDICINE
Payer: COMMERCIAL

## 2021-03-25 DIAGNOSIS — M81.0 OSTEOPOROSIS, UNSPECIFIED OSTEOPOROSIS TYPE, UNSPECIFIED PATHOLOGICAL FRACTURE PRESENCE: ICD-10-CM

## 2021-03-25 DIAGNOSIS — K76.89 LIVER CYST: ICD-10-CM

## 2021-03-25 DIAGNOSIS — Z11.59 NEED FOR HEPATITIS C SCREENING TEST: ICD-10-CM

## 2021-03-25 DIAGNOSIS — J44.9 CHRONIC OBSTRUCTIVE PULMONARY DISEASE, UNSPECIFIED COPD TYPE (HCC): ICD-10-CM

## 2021-03-25 LAB
ALBUMIN SERPL BCP-MCNC: 4.1 G/DL (ref 3.2–4.9)
ALBUMIN/GLOB SERPL: 1.5 G/DL
ALP SERPL-CCNC: 126 U/L (ref 30–99)
ALT SERPL-CCNC: 12 U/L (ref 2–50)
ANION GAP SERPL CALC-SCNC: 8 MMOL/L (ref 7–16)
AST SERPL-CCNC: 15 U/L (ref 12–45)
BASOPHILS # BLD AUTO: 0.8 % (ref 0–1.8)
BASOPHILS # BLD: 0.05 K/UL (ref 0–0.12)
BILIRUB SERPL-MCNC: 0.4 MG/DL (ref 0.1–1.5)
BUN SERPL-MCNC: 15 MG/DL (ref 8–22)
CALCIUM SERPL-MCNC: 9.8 MG/DL (ref 8.4–10.2)
CHLORIDE SERPL-SCNC: 103 MMOL/L (ref 96–112)
CHOLEST SERPL-MCNC: 189 MG/DL (ref 100–199)
CO2 SERPL-SCNC: 28 MMOL/L (ref 20–33)
CREAT SERPL-MCNC: 0.71 MG/DL (ref 0.5–1.4)
EOSINOPHIL # BLD AUTO: 0.32 K/UL (ref 0–0.51)
EOSINOPHIL NFR BLD: 4.9 % (ref 0–6.9)
ERYTHROCYTE [DISTWIDTH] IN BLOOD BY AUTOMATED COUNT: 42.8 FL (ref 35.9–50)
FASTING STATUS PATIENT QL REPORTED: NORMAL
GLOBULIN SER CALC-MCNC: 2.8 G/DL (ref 1.9–3.5)
GLUCOSE SERPL-MCNC: 100 MG/DL (ref 65–99)
HCT VFR BLD AUTO: 43.2 % (ref 37–47)
HDLC SERPL-MCNC: 46 MG/DL
HGB BLD-MCNC: 13.9 G/DL (ref 12–16)
IMM GRANULOCYTES # BLD AUTO: 0.03 K/UL (ref 0–0.11)
IMM GRANULOCYTES NFR BLD AUTO: 0.5 % (ref 0–0.9)
LDLC SERPL CALC-MCNC: 129 MG/DL
LYMPHOCYTES # BLD AUTO: 1.48 K/UL (ref 1–4.8)
LYMPHOCYTES NFR BLD: 22.7 % (ref 22–41)
MCH RBC QN AUTO: 29.3 PG (ref 27–33)
MCHC RBC AUTO-ENTMCNC: 32.2 G/DL (ref 33.6–35)
MCV RBC AUTO: 91.1 FL (ref 81.4–97.8)
MONOCYTES # BLD AUTO: 0.63 K/UL (ref 0–0.85)
MONOCYTES NFR BLD AUTO: 9.6 % (ref 0–13.4)
NEUTROPHILS # BLD AUTO: 4.02 K/UL (ref 2–7.15)
NEUTROPHILS NFR BLD: 61.5 % (ref 44–72)
NRBC # BLD AUTO: 0 K/UL
NRBC BLD-RTO: 0 /100 WBC
PLATELET # BLD AUTO: 283 K/UL (ref 164–446)
PMV BLD AUTO: 9.4 FL (ref 9–12.9)
POTASSIUM SERPL-SCNC: 4.5 MMOL/L (ref 3.6–5.5)
PROT SERPL-MCNC: 6.9 G/DL (ref 6–8.2)
RBC # BLD AUTO: 4.74 M/UL (ref 4.2–5.4)
SODIUM SERPL-SCNC: 139 MMOL/L (ref 135–145)
TRIGL SERPL-MCNC: 71 MG/DL (ref 0–149)
WBC # BLD AUTO: 6.5 K/UL (ref 4.8–10.8)

## 2021-03-25 PROCEDURE — 80061 LIPID PANEL: CPT

## 2021-03-25 PROCEDURE — 80053 COMPREHEN METABOLIC PANEL: CPT

## 2021-03-25 PROCEDURE — 85025 COMPLETE CBC W/AUTO DIFF WBC: CPT

## 2021-03-25 PROCEDURE — G0472 HEP C SCREEN HIGH RISK/OTHER: HCPCS

## 2021-03-25 PROCEDURE — 36415 COLL VENOUS BLD VENIPUNCTURE: CPT

## 2021-03-26 ENCOUNTER — TELEPHONE (OUTPATIENT)
Dept: MEDICAL GROUP | Facility: MEDICAL CENTER | Age: 61
End: 2021-03-26

## 2021-03-26 LAB — HCV AB SER QL: NORMAL

## 2021-03-26 NOTE — TELEPHONE ENCOUNTER
Phone Number Called: [phone] (home) [phone] (work)    Call outcome: Spoke to patient regarding message below.    Message: Pt informed,voiced understanding.  ----- Message from Merle Gore M.D. sent at 3/26/2021  1:06 PM PDT -----  Please notify patient that I have reviewed her labs which are stable. It appears she was going to [facility] for a pap at some point. Please remind patient to schedule an appointment at her convenience.

## 2021-04-14 ENCOUNTER — OFFICE VISIT (OUTPATIENT)
Dept: MEDICAL GROUP | Facility: MEDICAL CENTER | Age: 61
End: 2021-04-14
Payer: COMMERCIAL

## 2021-04-14 ENCOUNTER — HOSPITAL ENCOUNTER (OUTPATIENT)
Dept: RADIOLOGY | Facility: MEDICAL CENTER | Age: 61
End: 2021-04-14
Attending: FAMILY MEDICINE
Payer: COMMERCIAL

## 2021-04-14 ENCOUNTER — HOSPITAL ENCOUNTER (OUTPATIENT)
Facility: MEDICAL CENTER | Age: 61
End: 2021-04-14
Attending: FAMILY MEDICINE
Payer: COMMERCIAL

## 2021-04-14 VITALS
BODY MASS INDEX: 22.66 KG/M2 | OXYGEN SATURATION: 93 % | RESPIRATION RATE: 16 BRPM | HEART RATE: 83 BPM | SYSTOLIC BLOOD PRESSURE: 110 MMHG | HEIGHT: 69 IN | WEIGHT: 153 LBS | TEMPERATURE: 98.2 F | DIASTOLIC BLOOD PRESSURE: 72 MMHG

## 2021-04-14 DIAGNOSIS — R74.8 ELEVATED ALKALINE PHOSPHATASE LEVEL: ICD-10-CM

## 2021-04-14 DIAGNOSIS — Z12.4 SCREENING FOR CERVICAL CANCER: ICD-10-CM

## 2021-04-14 DIAGNOSIS — M81.0 OSTEOPOROSIS, UNSPECIFIED OSTEOPOROSIS TYPE, UNSPECIFIED PATHOLOGICAL FRACTURE PRESENCE: ICD-10-CM

## 2021-04-14 DIAGNOSIS — Z01.419 WELL WOMAN EXAM: ICD-10-CM

## 2021-04-14 DIAGNOSIS — B35.1 ONYCHOMYCOSIS OF TOENAIL: ICD-10-CM

## 2021-04-14 DIAGNOSIS — Z12.31 ENCOUNTER FOR SCREENING MAMMOGRAM FOR MALIGNANT NEOPLASM OF BREAST: ICD-10-CM

## 2021-04-14 DIAGNOSIS — Z11.51 SCREENING FOR HPV (HUMAN PAPILLOMAVIRUS): ICD-10-CM

## 2021-04-14 DIAGNOSIS — R73.9 HYPERGLYCEMIA: ICD-10-CM

## 2021-04-14 DIAGNOSIS — E04.1 THYROID NODULE: ICD-10-CM

## 2021-04-14 PROCEDURE — 99000 SPECIMEN HANDLING OFFICE-LAB: CPT | Performed by: FAMILY MEDICINE

## 2021-04-14 PROCEDURE — 77080 DXA BONE DENSITY AXIAL: CPT

## 2021-04-14 PROCEDURE — 77063 BREAST TOMOSYNTHESIS BI: CPT

## 2021-04-14 PROCEDURE — 88175 CYTOPATH C/V AUTO FLUID REDO: CPT

## 2021-04-14 PROCEDURE — 99396 PREV VISIT EST AGE 40-64: CPT | Performed by: FAMILY MEDICINE

## 2021-04-14 PROCEDURE — 87624 HPV HI-RISK TYP POOLED RSLT: CPT

## 2021-04-14 RX ORDER — TERBINAFINE HYDROCHLORIDE 250 MG/1
250 TABLET ORAL DAILY
Qty: 30 TABLET | Refills: 2 | Status: SHIPPED | OUTPATIENT
Start: 2021-04-14 | End: 2021-08-13

## 2021-04-14 ASSESSMENT — FIBROSIS 4 INDEX: FIB4 SCORE: 0.96

## 2021-04-14 NOTE — PROGRESS NOTES
Subjective:     CC:   Chief Complaint   Patient presents with   • Gynecologic Exam   • Nail Problem       HPI:   Sita Medina is a 61 y.o. female who presents for annual exam. She is feeling well.    Ob-Gyn/ History:    Patient has GYN provider: no  /Para:    Last Pap Smear:  At least 5 years, no history of abnormal pap smears.  Gyn Surgery:  None.  Current Contraceptive Method: Not currently sexually active.  Last menstrual period:  49yo  Post-menopausal bleeding: None  Urinary incontinence: None    Health Maintenance  Osteoporosis Screen/ DEXA: Scheduled today   PT/vit D for falls prevention: Patient reports diet adequate in vit D, daily weight bearing exercise   Cholesterol Screening: Recently completed,   Diabetes Screening: glucose 100 on recent labs   Aspirin Use: NA    Diet: Patient reports she could increase vegetable intake; difficult as her  and granddaughter do not like vegetables    Exercise: Patient walks several times weekly  Substance Abuse: None   Safe in relationship.   Seat belts, bike helmet, gun safety discussed.  Discussed sun protection    Cancer screening  Colorectal Cancer Screening: UTD    Lung Cancer Screening: NA    Cervical Cancer Screening: Completed today   Breast Cancer Screening: Mammo this afternoon     Infectious disease screening/Immunizations  --Immunizations: Patient recently received her second COVID-19 vaccination, will plan to administer pneumococcal, shingrix, and Tdap at next visit.       She  has a past medical history of ASTHMA, COPD (chronic obstructive pulmonary disease) ([facility]) (10/4/2010), COPD (chronic obstructive pulmonary disease) (HCC) (10/4/2010), Eczema (10/4/2010), Onychomycosis of toenail (2021), Osteopenia (10/4/2010), and Recurrent acute sinusitis. She also has no past medical history of CAD (coronary artery disease), Cancer ([facility]), Congestive heart failure ([facility]), Diabetes, Hypertension, Liver disease, Psychiatric  disorder, Renal disorder, Seizure disorder (HCC), or Stroke (HCC).  She  has no past surgical history on file.    Family History   Problem Relation Age of Onset   • Cancer Mother         breast   • Allergies Mother    • Diabetes Father    • Cancer Brother         esophageal cancer       Social History     Socioeconomic History   • Marital status:      Spouse name: Not on file   • Number of children: Not on file   • Years of education: Not on file   • Highest education level: Not on file   Occupational History   • Not on file   Tobacco Use   • Smoking status: Former Smoker     Packs/day: 0.00     Years: 15.00     Pack years: 0.00     Types: Cigarettes   • Smokeless tobacco: Never Used   Substance and Sexual Activity   • Alcohol use: Never   • Drug use: No   • Sexual activity: Yes     Partners: Male   Other Topics Concern   • Not on file   Social History Narrative   • Not on file     Social Determinants of Health     Financial Resource Strain:    • Difficulty of Paying Living Expenses:    Food Insecurity:    • Worried About Running Out of Food in the Last Year:    • Ran Out of Food in the Last Year:    Transportation Needs:    • Lack of Transportation (Medical):    • Lack of Transportation (Non-Medical):    Physical Activity:    • Days of Exercise per Week:    • Minutes of Exercise per Session:    Stress:    • Feeling of Stress :    Social Connections:    • Frequency of Communication with Friends and Family:    • Frequency of Social Gatherings with Friends and Family:    • Attends Spiritism Services:    • Active Member of Clubs or Organizations:    • Attends Club or Organization Meetings:    • Marital Status:    Intimate Partner Violence:    • Fear of Current or Ex-Partner:    • Emotionally Abused:    • Physically Abused:    • Sexually Abused:        Patient Active Problem List    Diagnosis Date Noted   • Onychomycosis of toenail 04/14/2021   • Liver cyst 01/22/2021   • Sinusitis 10/04/2010   • COPD (chronic  "obstructive pulmonary disease) (HCC) 10/04/2010   • Osteopenia 10/04/2010         Current Outpatient Medications   Medication Sig Dispense Refill   • Apoaequorin (PREVAGEN PO) Take  by mouth.     • terbinafine (LAMISIL) 250 MG Tab Take 1 tablet by mouth every day. 30 tablet 2   • pantoprazole (PROTONIX) 40 MG Tablet Delayed Response Take 40 mg by mouth every day.     • SYMBICORT 160-4.5 MCG/ACT Aerosol      • Cholecalciferol (VITAMIN D) 2000 UNIT Tab Take  by mouth every day.       No current facility-administered medications for this visit.     Allergies   Allergen Reactions   • Cefzil [Cefprozil]      nausea       Review of Systems   Constitutional: Negative for fever and chills  HENT: Negative for congestion.    Eyes: Negative for blurry vision.  Respiratory: Negative for cough and shortness of breath.    Cardiovascular: Negative for chest pain and leg swelling.   Gastrointestinal: Negative for nausea, vomiting, abdominal pain and diarrhea.   Genitourinary: Negative for dysuria and hematuria.   Skin: Negative for rash.   Neurological: Negative for dizziness, focal weakness and headaches.   Heme: Does not bruise/bleed easily.   Psychiatric/Behavioral: Negative for depression.  The patient is not nervous/anxious.      Objective:     /72 (BP Location: Left arm, Patient Position: Sitting, BP Cuff Size: Adult long)   Pulse 83   Temp 36.8 °C (98.2 °F) (Temporal)   Resp 16   Ht 1.74 m (5' 8.5\")   Wt 69.4 kg (153 lb)   LMP 04/02/2008   SpO2 93%   BMI 22.93 kg/m²   Body mass index is 22.93 kg/m².  Wt Readings from Last 4 Encounters:   04/14/21 69.4 kg (153 lb)   02/02/21 67.1 kg (148 lb)   01/22/21 65.8 kg (145 lb)   03/18/20 67.1 kg (148 lb)       Physical Exam:  Constitutional: Well-developed, well-nourished. Appears staged age.   Skin: Skin is warm and dry. No rash noted.  Head: Atraumatic without obvious lesions.  Eyes: Conjunctivae and extraocular motions intact. Pupils are equal, round, and reactive to " light. No scleral icterus.   Ears:  External ears unremarkable. Tympanic membranes clear and intact.  Nose: Nares patent. Septum midline. Turbinates without erythema nor edema. No discharge.   Neck: Supple, trachea midline. Normal range of motion. No thyromegaly present. No lymphadenopathy--cervical or supraclavicular.  Cardiovascular: Regular rate and rhythm, S1 and S2 without murmur, rubs, or gallops.  Lungs: Normal inspiratory effort, CTA bilaterally, no wheezes/rhonchi/rales  Abdomen: Soft, non tender, and without distention. Active bowel sounds. No rebound or guarding.  :Perineum and external genitalia normal without rash. Vagina with normal and physiologic discharge. Cervix without visible lesions or discharge.   Extremities: No cyanosis, clubbing, erythema, nor edema. Distal pulses intact and symmetric.   Neurological: Alert and oriented x 3.   Psychiatric:  Behavior, euthymic affect        Assessment and Plan:     1. Well woman exam  HCM: Pap completed today, mammogram and bone density scheduled for later today, colonoscopy up-to-date  Labs: recently completed  Immunizations: Due for Shingrix, Tdap, pneumococcal however recently received second COVID19 vaccination, patient will get vaccinations at her next appointment  Patient counseled about skin care, diet, exercise, supplements, and gun safety.    2. Thyroid nodule  Patient reports history of thyroid nodule which was biopsied about a year ago.  Recommendation to repeat ultrasound in 1 year.  - US-THYROID; Future    3. Elevated alkaline phosphatase level  Mildly elevated alk phos on recent labs.  - Comp Metabolic Panel; Future  - ALKALINE PHOSPHATASE ISOENZYMES; Future    4. Hyperglycemia  Noted on recent labs.  - HEMOGLOBIN A1C; Future    5. Onychomycosis of toenail  Onychomycosis R great toenail  - terbinafine (LAMISIL) 250 MG Tab; Take 1 tablet by mouth every day.  Dispense: 30 tablet; Refill: 2    6. Screening for HPV (human papillomavirus)  -  THINPREP PAP WITH HPV; Future    7. Screening for cervical cancer  - THINPREP PAP WITH HPV; Future          Follow-up: Return in about 3 months (around 7/14/2021) for F/U lab.

## 2021-04-15 DIAGNOSIS — Z11.51 SCREENING FOR HPV (HUMAN PAPILLOMAVIRUS): ICD-10-CM

## 2021-04-15 DIAGNOSIS — Z12.4 SCREENING FOR CERVICAL CANCER: ICD-10-CM

## 2021-04-15 LAB
CYTOLOGY REG CYTOL: NORMAL
HPV HR 12 DNA CVX QL NAA+PROBE: NEGATIVE
HPV16 DNA SPEC QL NAA+PROBE: NEGATIVE
HPV18 DNA SPEC QL NAA+PROBE: NEGATIVE
SPECIMEN SOURCE: NORMAL

## 2021-05-04 ENCOUNTER — TELEMEDICINE (OUTPATIENT)
Dept: MEDICAL GROUP | Facility: MEDICAL CENTER | Age: 61
End: 2021-05-04
Payer: COMMERCIAL

## 2021-05-04 VITALS — BODY MASS INDEX: 22.22 KG/M2 | HEIGHT: 69 IN | WEIGHT: 150 LBS

## 2021-05-04 DIAGNOSIS — M81.0 AGE-RELATED OSTEOPOROSIS WITHOUT CURRENT PATHOLOGICAL FRACTURE: ICD-10-CM

## 2021-05-04 PROBLEM — K21.9 GERD (GASTROESOPHAGEAL REFLUX DISEASE): Status: ACTIVE | Noted: 2021-05-04

## 2021-05-04 PROCEDURE — 99213 OFFICE O/P EST LOW 20 MIN: CPT | Mod: 95 | Performed by: FAMILY MEDICINE

## 2021-05-04 ASSESSMENT — FIBROSIS 4 INDEX: FIB4 SCORE: 0.96

## 2021-05-04 NOTE — ASSESSMENT & PLAN NOTE
Recent DEXA scan demonstrates osteoporosis.  The patient has never been on any medication for osteoporosis.  She does have severe GERD for which she takes pantoprazole thus I would recommend Prolia rather than Fosamax due to her history of esophageal irritation.  The patient is pending to have fairly extensive dental work completed over the summer thus we will hold on starting Prolia (due to possible osteonecrosis of the jaw) until she is finished with her dental work.    DEXA 4/14/21:   10-year Probability of Fracture:  Major Osteoporotic     16.0%  Hip     5.0%

## 2021-05-04 NOTE — PROGRESS NOTES
Telemedicine Visit: Established Patient     This evaluation was conducted via Zoom using secure and encrypted videoconferencing technology. The patient was in a private location in the state of [place].    The patient's identity was confirmed and verbal consent was obtained for this virtual visit.    Subjective:   CC: follow up DEXA scan  Sita Medina is a 61 y.o. female presenting for evaluation and management of:    Age-related osteoporosis without current pathological fracture  Recent DEXA scan demonstrates osteoporosis.  The patient has never been on any medication for osteoporosis.  She does have severe GERD for which she takes pantoprazole thus I would recommend Prolia rather than Fosamax due to her history of esophageal irritation.  The patient is pending to have fairly extensive dental work completed over the summer thus we will hold on starting Prolia (due to possible osteonecrosis of the jaw) until she is finished with her dental work.    DEXA 4/14/21:   10-year Probability of Fracture:  Major Osteoporotic     16.0%  Hip     5.0%      ROS   Denies any recent fevers or chills. No nausea or vomiting. No chest pains or shortness of breath.     Allergies   Allergen Reactions   • Cefzil [Cefprozil]      nausea       Current medicines (including changes today)  Current Outpatient Medications   Medication Sig Dispense Refill   • Apoaequorin (PREVAGEN PO) Take  by mouth.     • terbinafine (LAMISIL) 250 MG Tab Take 1 tablet by mouth every day. 30 tablet 2   • pantoprazole (PROTONIX) 40 MG Tablet Delayed Response Take 40 mg by mouth every day.     • SYMBICORT 160-4.5 MCG/ACT Aerosol 2 Puffs 2 times a day.     • Cholecalciferol (VITAMIN D) 2000 UNIT Tab Take  by mouth every day.       No current facility-administered medications for this visit.       Patient Active Problem List    Diagnosis Date Noted   • GERD (gastroesophageal reflux disease) 05/04/2021   • Onychomycosis of toenail 04/14/2021   • Liver cyst  "01/22/2021   • Sinusitis 10/04/2010   • COPD (chronic obstructive pulmonary disease) ([facility]) 10/04/2010   • Age-related osteoporosis without current pathological fracture 10/04/2010       Family History   Problem Relation Age of Onset   • Cancer Mother         breast   • Allergies Mother    • Diabetes Father    • Cancer Brother         esophageal cancer       She  has a past medical history of ASTHMA, COPD (chronic obstructive pulmonary disease) ([facility]) (10/4/2010), COPD (chronic obstructive pulmonary disease) ([facility]) (10/4/2010), Eczema (10/4/2010), Onychomycosis of toenail (4/14/2021), Osteopenia (10/4/2010), and Recurrent acute sinusitis. She also has no past medical history of CAD (coronary artery disease), Cancer ([facility]), Congestive heart failure ([facility]), Diabetes, Hypertension, Liver disease, Psychiatric disorder, Renal disorder, Seizure disorder ([facility]), or Stroke ([facility]).  She  has no past surgical history on file.       Objective:   Ht 1.74 m (5' 8.5\") Comment: pt. reported  Wt 68 kg (150 lb) Comment: pt. reported  LMP 04/02/2008   BMI 22.48 kg/m²     Physical Exam:  Constitutional: Alert, no distress, well-groomed.  Skin: No rashes in visible areas.  Eye: Round. Conjunctiva clear, lids normal. No icterus.   ENMT: Lips pink without lesions, good dentition, moist mucous membranes. Phonation normal.  Respiratory: Unlabored respiratory effort, no cough or audible wheeze  Psych: Alert and oriented x3, normal affect and mood.       Assessment and Plan:   The following treatment plan was discussed:     1. Age-related osteoporosis without current pathological fracture  This is a new problem.  Discussed possible treatment options per above and will plan for Prolia in the future after the patient finishes her dental work.  She will notify me when she has completed her dental work. In the meantime discussed recommendation for daily weight bearing exercise, calcium - 1,200mg daily through dietary and supplemental sources, and at " least 800IU of vitamin D daily.

## 2021-06-24 ENCOUNTER — OFFICE VISIT (OUTPATIENT)
Dept: URGENT CARE | Facility: CLINIC | Age: 61
End: 2021-06-24
Payer: COMMERCIAL

## 2021-06-24 VITALS
HEIGHT: 69 IN | TEMPERATURE: 97.6 F | BODY MASS INDEX: 22.22 KG/M2 | SYSTOLIC BLOOD PRESSURE: 104 MMHG | WEIGHT: 150 LBS | HEART RATE: 68 BPM | RESPIRATION RATE: 16 BRPM | DIASTOLIC BLOOD PRESSURE: 52 MMHG | OXYGEN SATURATION: 95 %

## 2021-06-24 DIAGNOSIS — S80.861A INSECT BITE OF RIGHT LOWER LEG WITH LOCAL REACTION, INITIAL ENCOUNTER: ICD-10-CM

## 2021-06-24 DIAGNOSIS — L03.115 CELLULITIS OF RIGHT LOWER EXTREMITY: ICD-10-CM
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
## 2021-06-24 PROCEDURE — 99213 OFFICE O/P EST LOW 20 MIN: CPT | Performed by: NURSE PRACTITIONER

## 2021-06-24 RX ORDER — ALBUTEROL SULFATE 90 UG/1
AEROSOL, METERED RESPIRATORY (INHALATION)
COMMUNITY
Start: 2021-06-16 | End: 2023-02-27 | Stop reason: SDUPTHER

## 2021-06-24 RX ORDER — TRIAMCINOLONE ACETONIDE 1 MG/G
1 CREAM TOPICAL 2 TIMES DAILY
Qty: 28.4 G | Refills: 0 | Status: SHIPPED | OUTPATIENT
Start: 2021-06-24 | End: 2021-07-01

## 2021-06-24 RX ORDER — DOXYCYCLINE HYCLATE 100 MG
100 TABLET ORAL 2 TIMES DAILY
Qty: 14 TABLET | Refills: 0 | Status: SHIPPED | OUTPATIENT
Start: 2021-06-24 | End: 2021-07-01

## 2021-06-24 ASSESSMENT — ENCOUNTER SYMPTOMS
FEVER: 0
CONSTITUTIONAL NEGATIVE: 1

## 2021-06-24 ASSESSMENT — VISUAL ACUITY: OU: 1

## 2021-06-24 ASSESSMENT — FIBROSIS 4 INDEX: FIB4 SCORE: 0.96

## 2021-06-24 NOTE — PROGRESS NOTES
Subjective:     Sita Mdeina is a 61 y.o. female who presents for Insect Bite (x 1.5 weeks, R leg, spreading redness/itchiness around site)       Other  This is a new problem. The problem has been gradually worsening. Associated symptoms include a rash. Pertinent negatives include no fever.     Patient reports that about 1-1/2 weeks ago, she was camping when she noticed an insect bite at her right lower leg.  Had initial discoloration and swelling which improved.  However, yesterday, started to notice redness, swelling, and itchiness of her skin below the bite.  Symptoms are also starting to spread upwards and around the bite as well.  Tried cortisone cream with no improvement in the symptoms.  Denies fever or other symptoms.    Patient was screened prior to rooming and denied COVID-19 diagnosis or contact with a person who has been diagnosed or is suspected to have COVID-19. During this visit, appropriate PPE was worn, hand hygiene was performed, and the patient and any visitors were masked.     PMH:  has a past medical history of ASTHMA, COPD (chronic obstructive pulmonary disease) ([facility]) (10/4/2010), COPD (chronic obstructive pulmonary disease) ([facility]) (10/4/2010), Eczema (10/4/2010), Onychomycosis of toenail (4/14/2021), Osteopenia (10/4/2010), and Recurrent acute sinusitis. She also has no past medical history of CAD (coronary artery disease), Cancer ([facility]), Congestive heart failure ([facility]), Diabetes, Hypertension, Liver disease, Psychiatric disorder, Renal disorder, Seizure disorder ([facility]), or Stroke ([facility]).    MEDS:   Current Outpatient Medications:   •  albuterol 108 (90 Base) MCG/ACT Aero Soln inhalation aerosol, , Disp: , Rfl:   •  triamcinolone acetonide (KENALOG) 0.1 % Cream, Apply 1 Application topically 2 times a day for 7 days., Disp: 28.4 g, Rfl: 0  •  doxycycline (VIBRAMYCIN) 100 MG Tab, Take 1 tablet by mouth 2 times a day for 7 days., Disp: 14 tablet, Rfl: 0  •  Apoaequorin (PREVAGEN PO), Take   "by mouth., Disp: , Rfl:   •  terbinafine (LAMISIL) 250 MG Tab, Take 1 tablet by mouth every day., Disp: 30 tablet, Rfl: 2  •  pantoprazole (PROTONIX) 40 MG Tablet Delayed Response, Take 40 mg by mouth every day., Disp: , Rfl:   •  SYMBICORT 160-4.5 MCG/ACT Aerosol, 2 Puffs 2 times a day., Disp: , Rfl:   •  Cholecalciferol (VITAMIN D) 2000 UNIT Tab, Take  by mouth every day., Disp: , Rfl:     ALLERGIES:   Allergies   Allergen Reactions   • Cefzil [Cefprozil]      nausea     SURGHX: History reviewed. No pertinent surgical history.    SOCHX:  reports that she has quit smoking. Her smoking use included cigarettes. She smoked 0.00 packs per day for 15.00 years. She has never used smokeless tobacco. She reports that she does not drink alcohol and does not use drugs.     FH: Reviewed with patient, not pertinent to this visit.    Review of Systems   Constitutional: Negative.  Negative for fever and malaise/fatigue.   Skin: Positive for itching and rash.   All other systems reviewed and are negative.    Additional details per HPI.      Objective:     /52 (BP Location: Left arm, Patient Position: Sitting, BP Cuff Size: Adult)   Pulse 68   Temp 36.4 °C (97.6 °F) (Temporal)   Resp 16   Ht 1.74 m (5' 8.5\")   Wt 68 kg (150 lb)   LMP 04/02/2008   SpO2 95%   BMI 22.48 kg/m²     Physical Exam  Vitals reviewed.   Constitutional:       General: She is not in acute distress.     Appearance: She is well-developed. She is not ill-appearing or toxic-appearing.   HENT:      Head: Normocephalic.      Right Ear: External ear normal.      Left Ear: External ear normal.   Eyes:      General: Vision grossly intact.      Extraocular Movements: Extraocular movements intact.      Conjunctiva/sclera: Conjunctivae normal.   Cardiovascular:      Rate and Rhythm: Normal rate.   Pulmonary:      Effort: Pulmonary effort is normal. No respiratory distress.   Musculoskeletal:         General: No deformity. Normal range of motion.      " Cervical back: Normal range of motion.   Skin:     General: Skin is warm and dry.      Coloration: Skin is not pale.      Findings: Erythema and rash present.          Neurological:      Mental Status: She is alert and oriented to person, place, and time.      Sensory: No sensory deficit.      Motor: No weakness.      Coordination: Coordination normal.   Psychiatric:         Behavior: Behavior normal. Behavior is cooperative.       Assessment/Plan:     1. Insect bite of right lower leg with local reaction, initial encounter  - triamcinolone acetonide (KENALOG) 0.1 % Cream; Apply 1 Application topically 2 times a day for 7 days.  Dispense: 28.4 g; Refill: 0    2. Cellulitis of right lower extremity  - doxycycline (VIBRAMYCIN) 100 MG Tab; Take 1 tablet by mouth 2 times a day for 7 days.  Dispense: 14 tablet; Refill: 0    Rx as above sent electronically.    Differential diagnosis, natural history, supportive care, over-the-counter symptom management per 's instructions, close monitoring, and indications for immediate follow-up discussed.     Patient advised to: Return for 1) Symptoms that worsen/don't improve, or go to ER, 2) Follow up with primary care in 7-10 days.    All questions answered. Patient agrees with the plan of care.    Discharge summary provided through [email or web address]

## 2021-06-24 NOTE — PATIENT INSTRUCTIONS
Insect Bite, Adult  An insect bite can make your skin red, itchy, and swollen. An insect bite is different from an insect sting, which happens when an insect injects poison (venom) into the skin.  Some insects can spread disease to people through a bite. However, most insect bites do not lead to disease and are not serious.  What are the causes?  Insects may bite for a variety of reasons, including:  · Hunger.  · To defend themselves.  Insects that bite include:  · Spiders.  · Mosquitoes.  · Ticks.  · Fleas.  · Ants.  · Flies.  · Kissing bugs.  · Chiggers.  What are the signs or symptoms?  Symptoms of this condition include:  · Itching or pain in the bite area.  · Redness and swelling in the bite area.  · An open wound (skin ulcer).  In many cases, symptoms last for 2-4 days.  In rare cases, a person may have a severe allergic reaction (anaphylactic reaction) to a bite. Symptoms of an anaphylactic reaction may include:  · Feeling warm in the face (flushed). This may include redness.  · Itchy, red, swollen areas of skin (hives).  · Swelling of the eyes, lips, face, mouth, tongue, or throat.  · Difficulty breathing, speaking, or swallowing.  · Noisy breathing (wheezing).  · Dizziness or light-headedness.  · Fainting.  · Pain or cramping in the abdomen.  · Vomiting.  · Diarrhea.  How is this diagnosed?  This condition is usually diagnosed based on symptoms and a physical exam.  How is this treated?  Treatment is usually not needed. Symptoms often go away on their own. When treatment is recommended, it may involve:  · Applying a cream or lotion to the bite area. This treatment helps with itching.  · Taking an antibiotic medicine. This treatment is needed if the bite area gets infected.  · Getting a tetanus shot, if you are not up to date on this vaccine.  · Applying ice to the affected area.  · Allergy medicines called antihistamines. This treatment may be needed if you develop itching or an allergic reaction to the  "insect bite.  · Giving yourself an epinephrine injection if you have an anaphylactic reaction to a bite. To give the injection, you will use what is commonly called an auto-injector \"pen\" (pre-filled automatic epinephrine injection device). Your health care provider will teach you how to use an auto-injector pen.  Follow these instructions at home:  Bite area care    · Do not scratch the bite area.  · Keep the bite area clean and dry. Wash it every day with soap and water as told by your health care provider.  · Check the bite area every day for signs of infection. Check for:  ? Redness, swelling, or pain.  ? Fluid or blood.  ? Warmth.  ? Pus or a bad smell.  Managing pain, itching, and swelling    · You may apply cortisone cream, calamine lotion, or a paste made of baking soda and water to the bite area as told by your health care provider.  · If directed, put ice on the bite area.  ? Put ice in a plastic bag.  ? Place a towel between your skin and the bag.  ? Leave the ice on for 20 minutes, 2-3 times a day.  General instructions  · Apply or take over-the-counter and prescription medicines only as told by your health care provider.  · If you were prescribed an antibiotic medicine, take or apply it as told by your health care provider. Do not stop using the antibiotic even if your condition improves.  · Keep all follow-up visits as told by your health care provider. This is important.  How is this prevented?  To help reduce your risk of insect bites:  · When you are outdoors, wear clothing that covers your arms and legs. This is especially important in the early morning and evening.  · Use insect repellent. The best insect repellents contain DEET, picaridin, oil of lemon eucalyptus (OLE), or ZS0723.  · Consider spraying your clothing with a pesticide called permethrin. Permethrin helps prevent insect bites. It works for several weeks and for up to 5-6 clothing washes. Do not apply permethrin directly to the " skin.  · If your home windows do not have screens, consider installing them.  · If you will be sleeping in an area where there are mosquitoes, consider covering your sleeping area with a mosquito net.  Contact a health care provider if:  · You have redness, swelling, or pain in the bite area.  · You have fluid or blood coming from the bite area.  · The bite area feels warm to the touch.  · You have pus or a bad smell coming from the bite area.  · You have a fever.  Get help right away if:  · You have joint pain.  · You have a rash.  · You feel unusually tired or sleepy.  · You have neck pain.  · You have a headache.  · You have unusual weakness.  · You develop symptoms of an anaphylactic reaction. These may include:  ? Flushed skin.  ? Hives.  ? Swelling of the eyes, lips, face, mouth, tongue, or throat.  ? Difficulty breathing, speaking, or swallowing.  ? Wheezing.  ? Dizziness or light-headedness.  ? Fainting.  ? Pain or cramping in the abdomen.  ? Vomiting.  ? Diarrhea.  These symptoms may represent a serious problem that is an emergency. Do not wait to see if the symptoms will go away. Do the following right away:  · Use the auto-injector pen as you have been instructed.  · Get medical help. Call your local emergency services (911 in the [place] Do not drive yourself to the hospital.  Summary  · An insect bite can make your skin red, itchy, and swollen.  · Treatment is usually not needed. Symptoms often go away on their own. When treatment is recommended, it may involve taking medicine, applying medicine to the area, or applying ice.  · Apply or take over-the-counter and prescription medicines only as told by your health care provider.  · Use insect repellent to help prevent insect bites.  · Contact a health care provider if you have any signs of infection in the bite area.  This information is not intended to replace advice given to you by your health care provider. Make sure you discuss any questions you have  with your health care provider.  Document Released: 01/25/2006 Document Revised: 06/28/2019 Document Reviewed: 06/28/2019  [employer] Patient Education © 2020 [employer]          Cellulitis, Adult    Cellulitis is a skin infection. The infected area is usually warm, red, swollen, and tender. This condition occurs most often in the arms and lower legs. The infection can travel to the muscles, blood, and underlying tissue and become serious. It is very important to get treated for this condition.  What are the causes?  Cellulitis is caused by bacteria. The bacteria enter through a break in the skin, such as a cut, burn, insect bite, open sore, or crack.  What increases the risk?  This condition is more likely to occur in people who:  · Have a weak body defense system (immune system).  · Have open wounds on the skin, such as cuts, burns, bites, and scrapes. Bacteria can enter the body through these open wounds.  · Are older than 60 years of age.  · Have diabetes.  · Have a type of long-lasting (chronic) liver disease (cirrhosis) or kidney disease.  · Are obese.  · Have a skin condition such as:  ? Itchy rash (eczema).  ? Slow movement of blood in the veins (venous stasis).  ? Fluid buildup below the skin (edema).  · Have had radiation therapy.  · Use IV drugs.  What are the signs or symptoms?  Symptoms of this condition include:  · Redness, streaking, or spotting on the skin.  · Swollen area of the skin.  · Tenderness or pain when an area of the skin is touched.  · Warm skin.  · A fever.  · Chills.  · Blisters.  How is this diagnosed?  This condition is diagnosed based on a medical history and physical exam. You may also have tests, including:  · Blood tests.  · Imaging tests.  How is this treated?  Treatment for this condition may include:  · Medicines, such as antibiotic medicines or medicines to treat allergies (antihistamines).  · Supportive care, such as rest and application of cold or warm cloths (compresses) to  the skin.  · Hospital care, if the condition is severe.  The infection usually starts to get better within 1-2 days of treatment.  Follow these instructions at home:    Medicines  · Take over-the-counter and prescription medicines only as told by your health care provider.  · If you were prescribed an antibiotic medicine, take it as told by your health care provider. Do not stop taking the antibiotic even if you start to feel better.  General instructions  · Drink enough fluid to keep your urine pale yellow.  · Do not touch or rub the infected area.  · Raise (elevate) the infected area above the level of your heart while you are sitting or lying down.  · Apply warm or cold compresses to the affected area as told by your health care provider.  · Keep all follow-up visits as told by your health care provider. This is important. These visits let your health care provider make sure a more serious infection is not developing.  Contact a health care provider if:  · You have a fever.  · Your symptoms do not begin to improve within 1-2 days of starting treatment.  · Your bone or joint underneath the infected area becomes painful after the skin has healed.  · Your infection returns in the same area or another area.  · You notice a swollen bump in the infected area.  · You develop new symptoms.  · You have a general ill feeling (malaise) with muscle aches and pains.  Get help right away if:  · Your symptoms get worse.  · You feel very sleepy.  · You develop vomiting or diarrhea that persists.  · You notice red streaks coming from the infected area.  · Your red area gets larger or turns dark in color.  These symptoms may represent a serious problem that is an emergency. Do not wait to see if the symptoms will go away. Get medical help right away. Call your local emergency services (911 in the [place] Do not drive yourself to the hospital.  Summary  · Cellulitis is a skin infection. This condition occurs most often in the arms  and lower legs.  · Treatment for this condition may include medicines, such as antibiotic medicines or antihistamines.  · Take over-the-counter and prescription medicines only as told by your health care provider. If you were prescribed an antibiotic medicine, do not stop taking the antibiotic even if you start to feel better.  · Contact a health care provider if your symptoms do not begin to improve within 1-2 days of starting treatment or your symptoms get worse.  · Keep all follow-up visits as told by your health care provider. This is important. These visits let your health care provider make sure that a more serious infection is not developing.  This information is not intended to replace advice given to you by your health care provider. Make sure you discuss any questions you have with your health care provider.  Document Released: 09/27/2006 Document Revised: 05/09/2019 Document Reviewed: 05/09/2019  [employer] Patient Education © 2020 [employer]

## 2021-06-29 ENCOUNTER — OFFICE VISIT (OUTPATIENT)
Dept: MEDICAL GROUP | Facility: MEDICAL CENTER | Age: 61
End: 2021-06-29
Payer: COMMERCIAL

## 2021-06-29 VITALS
OXYGEN SATURATION: 95 % | HEIGHT: 69 IN | SYSTOLIC BLOOD PRESSURE: 94 MMHG | BODY MASS INDEX: 22.5 KG/M2 | RESPIRATION RATE: 16 BRPM | WEIGHT: 151.9 LBS | HEART RATE: 64 BPM | DIASTOLIC BLOOD PRESSURE: 50 MMHG | TEMPERATURE: 97.1 F

## 2021-06-29 DIAGNOSIS — R21 RASH AND NONSPECIFIC SKIN ERUPTION: ICD-10-CM

## 2021-06-29 PROCEDURE — 99213 OFFICE O/P EST LOW 20 MIN: CPT | Performed by: FAMILY MEDICINE

## 2021-06-29 ASSESSMENT — FIBROSIS 4 INDEX: FIB4 SCORE: 0.96

## 2021-06-30 PROBLEM — R21 RASH AND NONSPECIFIC SKIN ERUPTION: Status: ACTIVE | Noted: 2021-06-30

## 2021-06-30 NOTE — PROGRESS NOTES
Subjective:     CC: follow up skin lesion    HPI:   Sita presents today with:    Rash and nonspecific skin eruption  Sita reports she was camping about 2 weeks ago and noticed several bug bites on her right shin.  She subsequently developed surrounding erythema and reports the area was very pruritic.  She was seen at urgent care 6/24/2021 and prescribed 7 days doxycycline. Sita reports the rash is improving, decreased erythema and pruritis. Patient has pictures from onset of rash through present; rash is improving per pictures. No fevers, chills, nausea, or vomiting.    Regarding type of insect, patient is unsure. She never saw a tick at the site of probable bite.    Past Medical History:   Diagnosis Date   • ASTHMA    • COPD (chronic obstructive pulmonary disease) ([facility]) 10/4/2010   • COPD (chronic obstructive pulmonary disease) ([facility]) 10/4/2010   • Eczema 10/4/2010   • Onychomycosis of toenail 4/14/2021   • Osteopenia 10/4/2010   • Recurrent acute sinusitis        Social History     Tobacco Use   • Smoking status: Former Smoker     Packs/day: 0.00     Years: 15.00     Pack years: 0.00     Types: Cigarettes   • Smokeless tobacco: Never Used   Vaping Use   • Vaping Use: Never used   Substance Use Topics   • Alcohol use: Never   • Drug use: No       Current Outpatient Medications Ordered in Epic   Medication Sig Dispense Refill   • albuterol 108 (90 Base) MCG/ACT Aero Soln inhalation aerosol      • triamcinolone acetonide (KENALOG) 0.1 % Cream Apply 1 Application topically 2 times a day for 7 days. 28.4 g 0   • doxycycline (VIBRAMYCIN) 100 MG Tab Take 1 tablet by mouth 2 times a day for 7 days. 14 tablet 0   • Apoaequorin (PREVAGEN PO) Take  by mouth.     • terbinafine (LAMISIL) 250 MG Tab Take 1 tablet by mouth every day. 30 tablet 2   • pantoprazole (PROTONIX) 40 MG Tablet Delayed Response Take 40 mg by mouth every day.     • SYMBICORT 160-4.5 MCG/ACT Aerosol 2 Puffs 2 times a day.     • Cholecalciferol (VITAMIN  "D) 2000 UNIT Tab Take  by mouth every day.       No current [facility]-ordered facility-administered medications on file.       Allergies:  Cefzil [cefprozil]    Health Maintenance: Sita defers vaccinations today    ROS:  Gen: no fevers/chills, no changes in weight  Eyes: no changes in vision  ENT: no sore throat, no hearing loss, no bloody nose  Pulm: no SOB  CV: no chest pain    Objective:     Exam:  BP (!) 94/50 (BP Location: Left arm, Patient Position: Sitting, BP Cuff Size: Adult long)   Pulse 64   Temp 36.2 °C (97.1 °F) (Temporal)   Resp 16   Ht 1.74 m (5' 8.5\")   Wt 68.9 kg (151 lb 14.4 oz)   LMP 04/02/2008   SpO2 95%   BMI 22.76 kg/m²  Body mass index is 22.76 kg/m².    Skin:  7X9cm erythematous, flat oval patch medial right shin with central clearing and two punctate erythematous possible insect bite marks in center; no edema, warmth or drainage    Assessment & Plan:     61 y.o. female with the following -     1. Rash and nonspecific skin eruption  Patient treated for cellulitis with seven day course of doxycycline. Erythema is improving. Given significant improvement, will defer additional antibiotic treatment at this time. Discussed s/sx necessitating return to clinic, urgent care or ED visit. Sita verbalized understanding.    Return if symptoms worsen or fail to improve.    Please note this dictation was created using voice recognition software. I have made every reasonable attempt to correct obvious errors, but I expect there may be errors of grammar, and possibly content, that I did not discover before finalizing the note.       "

## 2021-06-30 NOTE — ASSESSMENT & PLAN NOTE
Sita reports she was camping about 2 weeks ago and noticed several bug bites on her right shin.  She subsequently developed surrounding erythema and reports the area was very pruritic.  She was seen at urgent care 6/24/2021 and prescribed 7 days doxycycline. Sita reports the rash is improving, decreased erythema and pruritis. Patient has pictures from onset of rash through present; rash is improving per pictures.

## 2021-07-15 ENCOUNTER — TELEPHONE (OUTPATIENT)
Dept: MEDICAL GROUP | Facility: MEDICAL CENTER | Age: 61
End: 2021-07-15

## 2021-07-15 DIAGNOSIS — E04.1 THYROID NODULE: ICD-10-CM

## 2021-07-15 NOTE — TELEPHONE ENCOUNTER
Phone Number Called: [phone] (home) [phone] (work)    Call outcome: Spoke to patient regarding message below.    Message: Pt called to inform that [facility] wants her to be seen every year for the lump on her thyroid. Pt called to set up an appointment with them, but [facility] needs an order from Dr. Gore.

## 2021-07-30 ENCOUNTER — HOSPITAL ENCOUNTER (OUTPATIENT)
Dept: RADIOLOGY | Facility: MEDICAL CENTER | Age: 61
End: 2021-07-30
Attending: FAMILY MEDICINE
Payer: COMMERCIAL

## 2021-07-30 DIAGNOSIS — E04.1 THYROID NODULE: ICD-10-CM

## 2021-07-30 PROCEDURE — 76536 US EXAM OF HEAD AND NECK: CPT

## 2021-08-13 ENCOUNTER — OFFICE VISIT (OUTPATIENT)
Dept: MEDICAL GROUP | Facility: MEDICAL CENTER | Age: 61
End: 2021-08-13
Payer: COMMERCIAL

## 2021-08-13 VITALS
WEIGHT: 150.13 LBS | OXYGEN SATURATION: 97 % | SYSTOLIC BLOOD PRESSURE: 102 MMHG | RESPIRATION RATE: 16 BRPM | BODY MASS INDEX: 22.24 KG/M2 | HEIGHT: 69 IN | DIASTOLIC BLOOD PRESSURE: 62 MMHG | TEMPERATURE: 97.9 F | HEART RATE: 70 BPM

## 2021-08-13 DIAGNOSIS — E04.1 THYROID NODULE: ICD-10-CM

## 2021-08-13 DIAGNOSIS — R68.89 HEAT INTOLERANCE: ICD-10-CM

## 2021-08-13 DIAGNOSIS — Z23 NEED FOR VACCINATION: ICD-10-CM

## 2021-08-13 DIAGNOSIS — L65.9 HAIR LOSS: ICD-10-CM

## 2021-08-13 DIAGNOSIS — B35.1 ONYCHOMYCOSIS OF TOENAIL: ICD-10-CM

## 2021-08-13 PROCEDURE — 90471 IMMUNIZATION ADMIN: CPT | Performed by: FAMILY MEDICINE

## 2021-08-13 PROCEDURE — 99214 OFFICE O/P EST MOD 30 MIN: CPT | Mod: 25 | Performed by: FAMILY MEDICINE

## 2021-08-13 PROCEDURE — 90750 HZV VACC RECOMBINANT IM: CPT | Performed by: FAMILY MEDICINE

## 2021-08-13 RX ORDER — CALCIUM CARB/VITAMIN D3/VIT K1 650MG-12.5
TABLET,CHEWABLE ORAL
COMMUNITY
End: 2024-01-18

## 2021-08-13 ASSESSMENT — FIBROSIS 4 INDEX: FIB4 SCORE: 0.96

## 2021-08-13 NOTE — PROGRESS NOTES
"Subjective:     CC: follow up thyroid ultrasound; onychomycosis     HPI:   Sita presents today with:    Onychomycosis of toenail  Sita reports her toenail fungus has resolved s/p terbinafine.     Thyroid nodule  Sita has history of thyroid nodule; FNA 1/29/20 demonstrated \"follicular cells consistent with a benign nodule.\" Repeat ultrasound completed 7/30/21.    1.  3.8 x 2.8 x 2.1 solid slightly heterogeneous thyroid nodule occupying the majority of the lower pole of the right lobe of the thyroid gland.     2.  Mild enlargement of the right lobe secondary to the above mass.     3.  Reactive pathologic lymph node in the superior aspect of the right side of the neck in the anterior cervical chain region.     4.  Lymph node biopsy may be of value.     ACR TI-RADS Recommendations  TR4 - FNA recommended      Past Medical History:   Diagnosis Date   • ASTHMA    • COPD (chronic obstructive pulmonary disease) ([facility]) 10/4/2010   • COPD (chronic obstructive pulmonary disease) ([facility]) 10/4/2010   • Eczema 10/4/2010   • Onychomycosis of toenail 4/14/2021   • Osteopenia 10/4/2010   • Recurrent acute sinusitis        Social History     Tobacco Use   • Smoking status: Former Smoker     Packs/day: 0.00     Years: 15.00     Pack years: 0.00     Types: Cigarettes   • Smokeless tobacco: Never Used   Vaping Use   • Vaping Use: Never used   Substance Use Topics   • Alcohol use: Never   • Drug use: No       Current Outpatient Medications Ordered in Epic   Medication Sig Dispense Refill   • Calcium-Vitamin D-Vitamin K (VIACTIV CALCIUM PLUS D) 650-12.5-40 MG-MCG-MCG Chew Tab Chew.     • pantoprazole (PROTONIX) 40 MG Tablet Delayed Response TAKE 1 TABLET BY MOUTH EVERY DAY 90 tablet 0   • albuterol 108 (90 Base) MCG/ACT Aero Soln inhalation aerosol      • Apoaequorin (PREVAGEN PO) Take  by mouth.     • SYMBICORT 160-4.5 MCG/ACT Aerosol 1 Puff 2 times a day.     • Cholecalciferol (VITAMIN D) 2000 UNIT Tab Take  by mouth every day.       No " "current [facility]-ordered facility-administered medications on file.       Allergies:  Cefzil [cefprozil]    Health Maintenance: Due for vaccinations, declined    ROS:  Gen: no fevers/chills, no changes in weight  Eyes: no changes in vision  ENT: no sore throat, no hearing loss, no bloody nose  Pulm: no SOB  CV: no chest pain      Objective:       Exam:  /62 (BP Location: Left arm, Patient Position: Sitting, BP Cuff Size: Adult)   Pulse 70   Temp 36.6 °C (97.9 °F) (Temporal)   Resp 16   Ht 1.74 m (5' 8.5\")   Wt 68.1 kg (150 lb 2.1 oz)   LMP 04/02/2008   SpO2 97%   BMI 22.50 kg/m²  Body mass index is 22.5 kg/m².    Gen: Alert and oriented, No apparent distress  Lungs: Normal effort, CTA bilaterally, no wheezes, rhonchi, or rales  CV: Regular rate and rhythm, no murmurs, rubs, or gallops    Assessment & Plan:     61 y.o. female with the following -     1. Thyroid nodule  While FNA pathology benign 1/2020, recommend patient see ENT to discuss recent ultrasound and consider repeat biopsy.  - REFERRAL TO ENT    2. Onychomycosis of toenail  - resolved    3. Heat intolerance  4. Hair loss  Sita reports hair loss and heat intolerance  - TSH; Future  - TRIIDOTHYRONINE; Future  - FREE THYROXINE; Future    5. Need for vaccination  - Shingles Vaccine (Shingrix)       Return in about 6 months (around 2/13/2022).    Please note this dictation was created using voice recognition software. I have made every reasonable attempt to correct obvious errors, but I expect there may be errors of grammar, and possibly content, that I did not discover before finalizing the note.       "

## 2021-08-13 NOTE — ASSESSMENT & PLAN NOTE
"Sita has history of thyroid nodule; FNA 1/29/20 demonstrated \"follicular cells consistent with a benign nodule.\" Repeat ultrasound completed 7/30/21.    1.  3.8 x 2.8 x 2.1 solid slightly heterogeneous thyroid nodule occupying the majority of the lower pole of the right lobe of the thyroid gland.     2.  Mild enlargement of the right lobe secondary to the above mass.     3.  Reactive pathologic lymph node in the superior aspect of the right side of the neck in the anterior cervical chain region.     4.  Lymph node biopsy may be of value.     ACR TI-RADS Recommendations  TR4 - FNA recommended  "

## 2021-11-01 RX ORDER — PANTOPRAZOLE SODIUM 40 MG/1
TABLET, DELAYED RELEASE ORAL
Qty: 90 TABLET | Refills: 0 | Status: SHIPPED | OUTPATIENT
Start: 2021-11-01 | End: 2022-01-31

## 2021-11-24 ENCOUNTER — APPOINTMENT (OUTPATIENT)
Dept: RADIOLOGY | Facility: MEDICAL CENTER | Age: 61
End: 2021-11-24
Attending: EMERGENCY MEDICINE
Payer: COMMERCIAL

## 2021-11-24 ENCOUNTER — HOSPITAL ENCOUNTER (EMERGENCY)
Facility: MEDICAL CENTER | Age: 61
End: 2021-11-24
Attending: EMERGENCY MEDICINE
Payer: COMMERCIAL

## 2021-11-24 VITALS
TEMPERATURE: 98.6 F | RESPIRATION RATE: 15 BRPM | WEIGHT: 145 LBS | HEART RATE: 87 BPM | SYSTOLIC BLOOD PRESSURE: 135 MMHG | BODY MASS INDEX: 21.48 KG/M2 | OXYGEN SATURATION: 96 % | DIASTOLIC BLOOD PRESSURE: 74 MMHG | HEIGHT: 69 IN

## 2021-11-24 DIAGNOSIS — R05.9 COUGH: ICD-10-CM

## 2021-11-24 LAB
EKG IMPRESSION: NORMAL
FLUAV RNA SPEC QL NAA+PROBE: NEGATIVE
FLUBV RNA SPEC QL NAA+PROBE: NEGATIVE
RSV RNA SPEC QL NAA+PROBE: NEGATIVE
SARS-COV-2 RNA RESP QL NAA+PROBE: NOTDETECTED
SPECIMEN SOURCE: NORMAL

## 2021-11-24 PROCEDURE — 99283 EMERGENCY DEPT VISIT LOW MDM: CPT

## 2021-11-24 PROCEDURE — C9803 HOPD COVID-19 SPEC COLLECT: HCPCS

## 2021-11-24 PROCEDURE — 71046 X-RAY EXAM CHEST 2 VIEWS: CPT

## 2021-11-24 PROCEDURE — 93005 ELECTROCARDIOGRAM TRACING: CPT | Performed by: EMERGENCY MEDICINE

## 2021-11-24 PROCEDURE — 0241U HCHG SARS-COV-2 COVID-19 NFCT DS RESP RNA 4 TRGT MIC: CPT

## 2021-11-24 ASSESSMENT — FIBROSIS 4 INDEX: FIB4 SCORE: 0.96

## 2021-11-24 NOTE — ED PROVIDER NOTES
ED Provider Note    CHIEF COMPLAINT  Chief Complaint   Patient presents with   • Cough     with congestion, states that she was dx with sinus infection over weekend and has been on ABX for four days and is not getting any better   • Diarrhea     since starting ABX         HPI    Primary care provider: Merle Gore M.D.   History obtained from: Patient  History limited by: None     Sita Medina is a 61 y.o. female who presents to the ED complaining of continued cough with sputum production that started 5 days ago.  Patient states that she went to [facility] the following day and was prescribed Augmentin for sinus infection.  She has been taking the Augmentin but still has her symptoms which prompted her to come to this ED.  She reports a fever up to 99.  She denies significant nasal congestion at this time and reports that the ear pressure has improved.  She denies any pain except chest discomfort when she is coughing.  Her throat is a little bit sore from coughing.  She denies nausea/vomiting.  She states that she started having diarrhea in the morning and at night 2 days ago and thought that it may be due to the Augmentin.  She denies dysuria.  No rash noted.  No new swelling.  No recent travels or known ill contacts.  Patient states that she received the first 2 doses of her Moderna COVID-19 vaccination in March and April of this year and was scheduled to have her booster shot but was told that she should not have tge shot because she is on antibiotic at this time.  She has received her influenza vaccination for this year.    REVIEW OF SYSTEMS  Please see HPI for pertinent positives/negatives.  All other systems reviewed and are negative.     PAST MEDICAL HISTORY  Past Medical History:   Diagnosis Date   • Onychomycosis of toenail 4/14/2021   • Eczema 10/4/2010   • COPD (chronic obstructive pulmonary disease) ([facility]) 10/4/2010   • COPD (chronic obstructive pulmonary disease) ([facility])  "10/4/2010   • Osteopenia 10/4/2010   • ASTHMA    • Recurrent acute sinusitis         SURGICAL HISTORY  History reviewed. No pertinent surgical history.     SOCIAL HISTORY  Social History     Tobacco Use   • Smoking status: Former Smoker     Packs/day: 0.00     Years: 15.00     Pack years: 0.00     Types: Cigarettes   • Smokeless tobacco: Never Used   Vaping Use   • Vaping Use: Never used   Substance and Sexual Activity   • Alcohol use: Never   • Drug use: No   • Sexual activity: Yes     Partners: Male        FAMILY HISTORY  Family History   Problem Relation Age of Onset   • Cancer Mother         breast   • Allergies Mother    • Diabetes Father    • Cancer Brother         esophageal cancer        CURRENT MEDICATIONS  Home Medications    **Home medications have not yet been reviewed for this encounter**          ALLERGIES  Allergies   Allergen Reactions   • Cefzil [Cefprozil]      nausea        PHYSICAL EXAM  VITAL SIGNS: /74   Pulse 87   Temp 37 °C (98.6 °F) (Temporal)   Resp 15   Ht 1.753 m (5' 9\")   Wt 65.8 kg (145 lb)   LMP 04/02/2008   SpO2 96%   BMI 21.41 kg/m²  @ALEXEY[[id number]::@     Pulse ox interpretation: 95% I interpret this pulse ox as normal     Constitutional: Well developed, well nourished, alert in no apparent distress, nontoxic appearance    HENT: No external signs of trauma, normocephalic, oropharynx moist and clear, nose normal    Eyes: PERRL, conjunctiva without erythema, no discharge, no icterus    Neck: Soft and supple, trachea midline, no stridor, no tenderness, no LAD, no JVD, good ROM    Cardiovascular: Regular rate and rhythm, no murmurs/rubs/gallops, strong distal pulses and good perfusion    Thorax & Lungs: No respiratory distress, trace coarse breath sounds bilateral bases  Abdomen: Soft, nontender, nondistended, no guarding, no rebound, normal BS    Back: No CVAT    Extremities: No cyanosis, mild bilateral lower extremity edema more prominent on the left that patient states is " chronic since an injury 2 years ago, no gross deformity, good ROM, no tenderness, intact distal pulses with brisk cap refill    Skin: Warm, dry, no pallor/cyanosis, no rash noted    Lymphatic: No lymphadenopathy noted    Neuro: A/O times 3, no focal deficits noted, ambulating without difficulty  Psychiatric: Cooperative, normal mood and affect, normal judgement, appropriate for clinical situation        DIAGNOSTIC STUDIES / PROCEDURES    EKG  12 Lead EKG obtained at 0036 and interpreted by me:   Rate: 75   Rhythm: Sinus rhythm   Ectopy: None  Intervals: Normal   Axis: Normal   QRS: Low-voltage  ST segments: Normal  T Waves: Normal    Clinical Impression: Sinus rhythm without acute ischemic changes or dysrhythmia  Compared to 2019 without significant change      LABS  All labs reviewed by me.     Results for orders placed or performed during the hospital encounter of 21   COV-2, FLU A/B, AND RSV BY PCR (2-4 HOURS CEPHEID): Collect NP swab in VTM    Specimen: Nasopharyngeal; Respirate   Result Value Ref Range    Influenza virus A RNA Negative Negative    Influenza virus B, PCR Negative Negative    RSV, PCR Negative Negative    SARS-CoV-2 by PCR NotDetected     SARS-CoV-2 Source NP Swab    EKG (NOW)   Result Value Ref Range    Report       [facility] Emergency Dept[facility]    Test Date:  2021  Pt Name:    BARTOLO LAGUNAS                 Department: [facility]  MRN:        [id number]                      [facility]  Gender:     Female                       Technician: [id number]  :        1960                   Requested By:LUCY DAUGHERTY  Order #:    [id number]                    Reading MD:    Measurements  Intervals                                Axis  Rate:       75                           P:          73  VT:         153                          QRS:        32  QRSD:       96                           T:          35  QT:         384  QTc:        429    Interpretive  Statements  Sinus rhythm  Low voltage, precordial leads  Compared to ECG 11/05/2019 00:02:17  Low QRS voltage now present          RADIOLOGY  The radiologist's interpretation of all radiological studies have been reviewed by me.     DX-CHEST-2 VIEWS   Final Result      No radiographic evidence of acute cardiopulmonary process.             COURSE & MEDICAL DECISION MAKING  Nursing notes, VS, PMSFHx reviewed in chart.     Review of past medical records shows the patient was last seen in the office August 13, 2021 for follow-up of thyroid nodule and onychomycosis of toenail.      Differential diagnoses considered include but are not limited to: URI, pneumonia, bronchitis, influenza, COVID-19, pharyngitis/tonsillitis, viral syndrome      History and physical exam as above.  EKG without evidence for acute ischemic changes or dysrhythmia or significant change compared to prior.  Chest x-ray without evidence for acute abnormality.  Rapid influenza, RSV and COVID-19 testing returned negative.  I discussed the findings with the patient.  This is a pleasant well-appearing patient in no acute distress and nontoxic in appearance and clinically stable during her ED stay.  At this point, I do not suspect serious pathology such as sepsis, meningitis, epiglottitis, pharyngeal abscess, myocarditis, ACS or acute surgical abdomen.  Her diarrhea may be antibiotic related but is not severe and I do not suspect C. difficile at this time.  I discussed with patient that this is likely viral in etiology and supportive home care.  Return to ED precautions were given.  Patient verbalized understanding and agreed with plan of care with no further questions or concerns.      The patient is referred to a primary physician for blood pressure management, diabetic screening, and for all other preventative health concerns.       FINAL IMPRESSION  1. Cough Acute          DISPOSITION  Patient will be discharged home in stable condition.       FOLLOW  UP  Merle Gore M.D.  [address]  [address]  [place] [id number] [phone]    Call today      [facility] Emergency Dept  [address]  [place] [id number] [phone]    If symptoms worsen         OUTPATIENT MEDICATIONS  Discharge Medication List as of 11/24/2021  2:54 AM             Electronically signed by: Marty Amato D.O., 11/24/2021 12:13 AM      Portions of this record were made with voice recognition software.  Despite my review, spelling/grammar/context errors may still remain.  Interpretation of this chart should be taken in this context.

## 2021-11-24 NOTE — ED TRIAGE NOTES
"Chief Complaint   Patient presents with   • Cough     with congestion, states that she was dx with sinus infection over weekend and has been on ABX for four days and is not getting any better   • Diarrhea     since starting ABX      /78   Pulse 92   Temp 37.2 °C (98.9 °F) (Temporal)   Resp 18   Ht 1.753 m (5' 9\")   Wt 65.8 kg (145 lb)   LMP 04/02/2008   SpO2 95%   BMI 21.41 kg/m²     Has this patient been vaccinated for COVID yes  If not, would they like to be vaccinated while in the ER if eligible?  no  Would the patient like to speak with the ERP about the possibility of receiving the COVID vaccine today before making a decision? no    "

## 2021-12-03 ENCOUNTER — OFFICE VISIT (OUTPATIENT)
Dept: URGENT CARE | Facility: CLINIC | Age: 61
End: 2021-12-03
Payer: COMMERCIAL

## 2021-12-03 VITALS
WEIGHT: 145 LBS | SYSTOLIC BLOOD PRESSURE: 110 MMHG | RESPIRATION RATE: 19 BRPM | TEMPERATURE: 97.3 F | HEIGHT: 69 IN | OXYGEN SATURATION: 98 % | DIASTOLIC BLOOD PRESSURE: 58 MMHG | HEART RATE: 72 BPM | BODY MASS INDEX: 21.48 KG/M2

## 2021-12-03 DIAGNOSIS — J40 BRONCHITIS: ICD-10-CM

## 2021-12-03 PROCEDURE — 99214 OFFICE O/P EST MOD 30 MIN: CPT | Performed by: PHYSICIAN ASSISTANT

## 2021-12-03 RX ORDER — DOXYCYCLINE HYCLATE 100 MG
100 TABLET ORAL 2 TIMES DAILY
Qty: 14 TABLET | Refills: 0 | Status: SHIPPED | OUTPATIENT
Start: 2021-12-03 | End: 2021-12-10

## 2021-12-03 RX ORDER — BENZONATATE 100 MG/1
100 CAPSULE ORAL 3 TIMES DAILY PRN
Qty: 30 CAPSULE | Refills: 0 | Status: SHIPPED | OUTPATIENT
Start: 2021-12-03 | End: 2022-01-05

## 2021-12-03 ASSESSMENT — ENCOUNTER SYMPTOMS
PALPITATIONS: 0
SHORTNESS OF BREATH: 1
COUGH: 1
FEVER: 0

## 2021-12-03 ASSESSMENT — FIBROSIS 4 INDEX: FIB4 SCORE: 0.96

## 2021-12-03 ASSESSMENT — COPD QUESTIONNAIRES: COPD: 1

## 2021-12-04 NOTE — PROGRESS NOTES
Subjective:   Sita Medina is a 61 y.o. female who presents today with   Chief Complaint   Patient presents with   • Shortness of Breath   • Cough     Cough  This is a new problem. The current episode started 1 to 4 weeks ago. The problem has been unchanged. The problem occurs every few minutes. The cough is productive of sputum. Associated symptoms include nasal congestion and shortness of breath. Pertinent negatives include no chest pain or fever. Treatments tried: Symbicort, Flonase. The treatment provided no relief. Her past medical history is significant for asthma and COPD.      I personally donned proper PPE throughout visit today.   ER visit from November 24 did not show any concerns on work-up at that time including chest x-ray and Covid testing.  Patient states she was seen initially 4 days prior to that at [facility] and they gave her Augmentin which she was told to stop at [facility].  Symptoms slightly got better but then have somewhat plateaued.  PMH:  has a past medical history of ASTHMA, COPD (chronic obstructive pulmonary disease) ([facility]) (10/4/2010), COPD (chronic obstructive pulmonary disease) ([facility]) (10/4/2010), Eczema (10/4/2010), Onychomycosis of toenail (4/14/2021), Osteopenia (10/4/2010), and Recurrent acute sinusitis. She also has no past medical history of CAD (coronary artery disease), Cancer ([facility]), Congestive heart failure ([facility]), Diabetes, Hypertension, Liver disease, Psychiatric disorder, Renal disorder, Seizure disorder ([facility]), or Stroke ([facility]).  MEDS:   Current Outpatient Medications:   •  doxycycline (VIBRAMYCIN) 100 MG Tab, Take 1 Tablet by mouth 2 times a day for 7 days., Disp: 14 Tablet, Rfl: 0  •  benzonatate (TESSALON) 100 MG Cap, Take 1 Capsule by mouth 3 times a day as needed., Disp: 30 Capsule, Rfl: 0  •  pantoprazole (PROTONIX) 40 MG Tablet Delayed Response, TAKE 1 TABLET BY MOUTH EVERY DAY, Disp: 90 Tablet, Rfl: 0  •  Calcium-Vitamin D-Vitamin K (VIACTIV  "CALCIUM PLUS D) 650-12.5-40 MG-MCG-MCG Chew Tab, Chew., Disp: , Rfl:   •  albuterol 108 (90 Base) MCG/ACT Aero Soln inhalation aerosol, , Disp: , Rfl:   •  Apoaequorin (PREVAGEN PO), Take  by mouth., Disp: , Rfl:   •  SYMBICORT 160-4.5 MCG/ACT Aerosol, 1 Puff 2 times a day., Disp: , Rfl:   •  Cholecalciferol (VITAMIN D) 2000 UNIT Tab, Take  by mouth every day., Disp: , Rfl:   ALLERGIES:   Allergies   Allergen Reactions   • Cefzil [Cefprozil]      nausea     SURGHX: History reviewed. No pertinent surgical history.  SOCHX:  reports that she has quit smoking. Her smoking use included cigarettes. She smoked 0.00 packs per day for 15.00 years. She has never used smokeless tobacco. She reports that she does not drink alcohol and does not use drugs.  FH: Reviewed with patient, not pertinent to this visit.     Review of Systems   Constitutional: Negative for fever.   Respiratory: Positive for cough and shortness of breath.    Cardiovascular: Negative for chest pain and palpitations.      Objective:   /58 (BP Location: Right arm, Patient Position: Sitting, BP Cuff Size: Adult)   Pulse 72   Temp 36.3 °C (97.3 °F) (Temporal)   Resp 19   Ht 1.753 m (5' 9\")   Wt 65.8 kg (145 lb)   LMP 04/02/2008   SpO2 98%   BMI 21.41 kg/m²   Physical Exam  Vitals and nursing note reviewed.   Constitutional:       General: She is not in acute distress.     Appearance: Normal appearance. She is well-developed. She is not ill-appearing or toxic-appearing.      Comments: Hoarse voice   HENT:      Head: Normocephalic and atraumatic.      Right Ear: Hearing, tympanic membrane and ear canal normal.      Left Ear: Hearing and ear canal normal. A middle ear effusion is present.      Nose: Nose normal.   Eyes:      Conjunctiva/sclera: Conjunctivae normal.   Cardiovascular:      Rate and Rhythm: Normal rate and regular rhythm.      Heart sounds: Normal heart sounds.   Pulmonary:      Effort: Pulmonary effort is normal.      Breath sounds: " Wheezing (mild diffuse) and rhonchi (diffuse) present.   Musculoskeletal:      Comments: Normal movement in all 4 extremities   Skin:     General: Skin is warm and dry.   Neurological:      Mental Status: She is alert.      Coordination: Coordination normal.   Psychiatric:         Mood and Affect: Mood normal.       Assessment/Plan:   Assessment    1. Bronchitis  - doxycycline (VIBRAMYCIN) 100 MG Tab; Take 1 Tablet by mouth 2 times a day for 7 days.  Dispense: 14 Tablet; Refill: 0  - benzonatate (TESSALON) 100 MG Cap; Take 1 Capsule by mouth 3 times a day as needed.  Dispense: 30 Capsule; Refill: 0  Symptoms and presentation most consistent with persistent lower respiratory tract infection/bronchitis. We will send in antibiotics at this time for patient to use. No indication for x-ray at this time. Recommend patient start using albuterol inhaler once or twice a day along with Symbicort as prescribed. Offered steroids today but patient declines.  Differential diagnosis, natural history, supportive care, and indications for immediate follow-up discussed.   Patient given instructions and understanding of medications and treatment.    If not improving in 3-5 days, F/U with PCP or return to  if symptoms worsen.    Patient agreeable to plan.  Greater than 30 minutes were spent reviewing patient's chart, examining and obtaining history from patient, and discussing plan of care.     Please note that this dictation was created using voice recognition software. I have made every reasonable attempt to correct obvious errors, but I expect that there are errors of grammar and possibly content that I did not discover before finalizing the note.    Gregorio Hernandez PA-C

## 2021-12-24 ENCOUNTER — OFFICE VISIT (OUTPATIENT)
Dept: URGENT CARE | Facility: CLINIC | Age: 61
End: 2021-12-24
Payer: COMMERCIAL

## 2021-12-24 VITALS
DIASTOLIC BLOOD PRESSURE: 58 MMHG | TEMPERATURE: 97.7 F | RESPIRATION RATE: 16 BRPM | OXYGEN SATURATION: 95 % | HEART RATE: 86 BPM | WEIGHT: 142 LBS | BODY MASS INDEX: 21.52 KG/M2 | SYSTOLIC BLOOD PRESSURE: 112 MMHG | HEIGHT: 68 IN

## 2021-12-24 DIAGNOSIS — J01.00 ACUTE NON-RECURRENT MAXILLARY SINUSITIS: ICD-10-CM

## 2021-12-24 DIAGNOSIS — J22 ACUTE LOWER RESPIRATORY TRACT INFECTION: ICD-10-CM

## 2021-12-24 PROCEDURE — 99214 OFFICE O/P EST MOD 30 MIN: CPT | Performed by: NURSE PRACTITIONER

## 2021-12-24 RX ORDER — LEVOFLOXACIN 750 MG/1
750 TABLET, FILM COATED ORAL DAILY
Qty: 5 TABLET | Refills: 0 | Status: SHIPPED | OUTPATIENT
Start: 2021-12-24 | End: 2021-12-29

## 2021-12-24 RX ORDER — LEVOFLOXACIN 500 MG/1
500 TABLET, FILM COATED ORAL DAILY
Refills: 0 | Status: CANCELLED | OUTPATIENT
Start: 2021-12-24

## 2021-12-24 ASSESSMENT — FIBROSIS 4 INDEX: FIB4 SCORE: 0.96

## 2021-12-24 NOTE — PROGRESS NOTES
Chief Complaint   Patient presents with   • Cough     began friday, loss of voice, sore throat, neg covid test at home, ear pain, chest congestion        HISTORY OF PRESENT ILLNESS: Patient is a pleasant 61 y.o. female with a history of asthma and COPD who presents today due to symptoms which started 7 days ago. Pt reports a cough, nasal congestion, sinus pressure, mild sore throat, bilateral ear pressure, fever, chills, fatigue, malaise, wheezing, and body aches. She has a history of pneumonia, this feels similar. Has tried OTC cold medications without significant relief of symptoms. She was treated for a lower respiratory infection 3 weeks ago, completed entire course of doxycycline with full resolved. No other aggravating or alleviating factors. Denies known exposure to COVID-19.       Patient Active Problem List    Diagnosis Date Noted   • Thyroid nodule 08/13/2021   • Rash and nonspecific skin eruption 06/30/2021   • GERD (gastroesophageal reflux disease) 05/04/2021   • Onychomycosis of toenail 04/14/2021   • Liver cyst 01/22/2021   • Sinusitis 10/04/2010   • COPD (chronic obstructive pulmonary disease) (HCC) 10/04/2010   • Age-related osteoporosis without current pathological fracture 10/04/2010       Allergies:Cefzil [cefprozil]    Current Outpatient Medications Ordered in Epic   Medication Sig Dispense Refill   • levoFLOXacin (LEVAQUIN) 750 MG tablet Take 1 Tablet by mouth every day for 5 days. 5 Tablet 0   • Calcium-Vitamin D-Vitamin K (VIACTIV CALCIUM PLUS D) 650-12.5-40 MG-MCG-MCG Chew Tab Chew.     • albuterol 108 (90 Base) MCG/ACT Aero Soln inhalation aerosol      • Apoaequorin (PREVAGEN PO) Take  by mouth.     • SYMBICORT 160-4.5 MCG/ACT Aerosol 1 Puff 2 times a day.     • Cholecalciferol (VITAMIN D) 2000 UNIT Tab Take  by mouth every day.     • benzonatate (TESSALON) 100 MG Cap Take 1 Capsule by mouth 3 times a day as needed. (Patient not taking: Reported on 12/24/2021) 30 Capsule 0   • pantoprazole  "(PROTONIX) 40 MG Tablet Delayed Response TAKE 1 TABLET BY MOUTH EVERY DAY (Patient not taking: Reported on 12/24/2021) 90 Tablet 0     No current Epic-ordered facility-administered medications on file.       Past Medical History:   Diagnosis Date   • ASTHMA    • COPD (chronic obstructive pulmonary disease) ([facility]) 10/4/2010   • COPD (chronic obstructive pulmonary disease) ([facility]) 10/4/2010   • Eczema 10/4/2010   • Onychomycosis of toenail 4/14/2021   • Osteopenia 10/4/2010   • Recurrent acute sinusitis        Social History     Tobacco Use   • Smoking status: Former Smoker     Packs/day: 0.00     Years: 15.00     Pack years: 0.00     Types: Cigarettes   • Smokeless tobacco: Never Used   Vaping Use   • Vaping Use: Never used   Substance Use Topics   • Alcohol use: Never   • Drug use: No       Family Status   Relation Name Status   • Mo  Alive   • Fa  Alive   • Bro  Alive   • Bro  Alive     Family History   Problem Relation Age of Onset   • Cancer Mother         breast   • Allergies Mother    • Diabetes Father    • Cancer Brother         esophageal cancer       ROS:  Review of Systems   Constitutional: Positive for subjective fever, chills, fatigue, malaise. Negative for weight loss.  HENT: Positive for congestion, ear pressure, and sore throat. Negative for ear pain, nosebleeds, and neck pain.    Eyes: Negative for vision changes.   Cardiovascular: Negative for chest pain, palpitations, orthopnea and leg swelling.   Respiratory: Positive for cough and sputum production, wheezing.   Gastrointestinal: Negative for abdominal pain, nausea, vomiting or diarrhea.   Skin: Negative for rash, diaphoresis.     Exam:  /58 (BP Location: Left arm, Patient Position: Sitting, BP Cuff Size: Adult)   Pulse 86   Temp 36.5 °C (97.7 °F) (Temporal)   Resp 16   Ht 1.727 m (5' 8\")   Wt 64.4 kg (142 lb)   SpO2 95%   General: well-nourished, well-developed female in NAD  Head: normocephalic, atraumatic  Eyes: PERRLA, EOM within " normal limits, no conjunctival injection, no scleral icterus, visual fields and acuity grossly intact.  Ears: normal shape and symmetry, no tenderness, no discharge. External canals are without any significant edema or erythema. Tympanic membranes are without any inflammation, no effusion. Gross auditory acuity is intact.  Nose: symmetrical without tenderness, mild discharge, erythema present bilateral nares. + Left maxillary sinus tenderness.  Mouth/Throat: reasonable hygiene, no exudates or tonsillar enlargement. Mild erythema present.   Neck: no masses, range of motion within normal limits, no tracheal deviation.  Lymph: mild cervical adenopathy. No supraclavicular adenopathy.   Neuro: alert and oriented. Cranial nerves 1-12 grossly intact.   Cardiovascular: regular rate and rhythm without murmurs, rubs, or gallops. No edema.   Pulmonary: no distress. Chest is symmetrical with respiration. Scattered wheezes and rhonchi throughout.  Musculoskeletal: appropriate muscle tone, gait is stable.  Skin: warm, dry, intact, no clubbing, no cyanosis.   Psych: appropriate mood, affect, judgement.         Assessment/Plan:  1. Acute lower respiratory tract infection  levoFLOXacin (LEVAQUIN) 750 MG tablet   2. Acute non-recurrent maxillary sinusitis  levoFLOXacin (LEVAQUIN) 750 MG tablet         Levaquin as directed for lower respiratory infection and sinusitis. Potential side effects of medication discussed, including black box warning. Increase fluid intake, continue home asthma medications. Her rest, hand and respiratory hygiene.   May take OTC medications as directed for symptom relief.   Supportive care, differential diagnoses, and indications for immediate follow-up discussed with patient.   Pathogenesis of diagnosis discussed including typical length and natural progression.  Instructed to return to clinic or nearest emergency department for any change in condition, further concerns, or worsening of symptoms.  Patient  states understanding of the plan of care and discharge instructions.  Instructed to make an appointment with her primary care provider in the next 3-5 days if not significantly improving and for further care.         Please note that this dictation was created using voice recognition software. I have made every reasonable attempt to correct obvious errors, but I expect that there are errors of grammar and possibly content that I did not discover before finalizing the note.  N95 and safety glasses worn for the entire visit with the patient. Previous clinic visit encounter reviewed and considered in medical decision making today.       MARISA Rosario.

## 2022-01-05 ENCOUNTER — OFFICE VISIT (OUTPATIENT)
Dept: MEDICAL GROUP | Facility: MEDICAL CENTER | Age: 62
End: 2022-01-05
Payer: COMMERCIAL

## 2022-01-05 ENCOUNTER — HOSPITAL ENCOUNTER (OUTPATIENT)
Facility: MEDICAL CENTER | Age: 62
End: 2022-01-05
Attending: FAMILY MEDICINE
Payer: COMMERCIAL

## 2022-01-05 VITALS
RESPIRATION RATE: 16 BRPM | DIASTOLIC BLOOD PRESSURE: 70 MMHG | HEIGHT: 68 IN | WEIGHT: 145 LBS | OXYGEN SATURATION: 97 % | SYSTOLIC BLOOD PRESSURE: 112 MMHG | BODY MASS INDEX: 21.98 KG/M2 | TEMPERATURE: 98.7 F | HEART RATE: 89 BPM

## 2022-01-05 DIAGNOSIS — R00.2 PALPITATIONS: ICD-10-CM

## 2022-01-05 DIAGNOSIS — R05.9 COUGH: ICD-10-CM

## 2022-01-05 PROCEDURE — 0240U HCHG SARS-COV-2 COVID-19 NFCT DS RESP RNA 3 TRGT MIC: CPT

## 2022-01-05 PROCEDURE — 99214 OFFICE O/P EST MOD 30 MIN: CPT | Performed by: FAMILY MEDICINE

## 2022-01-05 RX ORDER — PREDNISONE 20 MG/1
40 TABLET ORAL DAILY
Qty: 10 TABLET | Refills: 0 | Status: SHIPPED | OUTPATIENT
Start: 2022-01-05 | End: 2022-01-13

## 2022-01-05 ASSESSMENT — PATIENT HEALTH QUESTIONNAIRE - PHQ9: CLINICAL INTERPRETATION OF PHQ2 SCORE: 0

## 2022-01-05 ASSESSMENT — FIBROSIS 4 INDEX: FIB4 SCORE: 0.96

## 2022-01-05 NOTE — ASSESSMENT & PLAN NOTE
"Patient reports a \"skippy heart\" for many years however reports palpitations increasing in frequency which are now associated with shortness of breath. Palpitations last a few minutes. She also reports racing heart with mild exertion ie walking on flat ground. No chest pain, LE edema, orthopnea, PND.  "

## 2022-01-05 NOTE — ASSESSMENT & PLAN NOTE
Sita reports chronic cough with green mucus production, symptoms occur in the morning.   Patient reports initially seeing Dr. Lm DEVRIES who scoped patient's throat in mid November 2021- Sita reports her symptoms started the following day.     She was initially seen at [facility] - prescribed augmentin, then [facility] - instructed to treat as viral infection, then [facility] urgent care - prescribed doxycycline and subsequently levoquin (which pt reports she did not take due to concern for tendon rupture).    No fevers, chills, shortness of breath. She is vaccinated and boosted against COVID19.     She has underlying COPD, uses symbicort BID. She has a follow up with pulmonology in about two weeks.

## 2022-01-05 NOTE — PROGRESS NOTES
"Subjective:     CC: cough with green mucus    HPI:   Sita presents today with:    Cough  Sita reports chronic cough with green mucus production, symptoms occur in the morning.   Patient reports initially seeing Dr. Amato ENT who scoped patient's throat in mid November 2021- Sita reports her symptoms started the following day.     She was initially seen at [facility] - prescribed augmentin, then [facility] - instructed to treat as viral infection, then [facility] urgent care - prescribed doxycycline and subsequently levoquin (which pt reports she did not take due to concern for tendon rupture).    No fevers, chills, shortness of breath. She is vaccinated and boosted against COVID19.     She has underlying COPD, uses symbicort BID. She has a follow up with pulmonology in about two weeks.    Palpitations  Patient reports a \"skippy heart\" for many years however reports palpitations increasing in frequency which are now associated with shortness of breath. Palpitations last a few minutes. She also reports racing heart with mild exertion ie walking on flat ground. No chest pain, LE edema, orthopnea, PND.      Past Medical History:   Diagnosis Date   • ASTHMA    • COPD (chronic obstructive pulmonary disease) ([facility]) 10/4/2010   • COPD (chronic obstructive pulmonary disease) ([facility]) 10/4/2010   • Eczema 10/4/2010   • Onychomycosis of toenail 4/14/2021   • Osteopenia 10/4/2010   • Recurrent acute sinusitis        Social History     Tobacco Use   • Smoking status: Former Smoker     Packs/day: 0.00     Years: 15.00     Pack years: 0.00     Types: Cigarettes   • Smokeless tobacco: Never Used   Vaping Use   • Vaping Use: Never used   Substance Use Topics   • Alcohol use: Never   • Drug use: No       Current Outpatient Medications Ordered in Epic   Medication Sig Dispense Refill   • predniSONE (DELTASONE) 20 MG Tab Take 2 Tablets by mouth every day. 10 Tablet 0   • pantoprazole (PROTONIX) 40 MG Tablet Delayed Response " "TAKE 1 TABLET BY MOUTH EVERY DAY 90 Tablet 0   • Calcium-Vitamin D-Vitamin K (VIACTIV CALCIUM PLUS D) 650-12.5-40 MG-MCG-MCG Chew Tab Chew.     • albuterol 108 (90 Base) MCG/ACT Aero Soln inhalation aerosol      • Apoaequorin (PREVAGEN PO) Take  by mouth.     • SYMBICORT 160-4.5 MCG/ACT Aerosol 1 Puff 2 times a day.       No current [facility]-ordered facility-administered medications on file.       Allergies:  Cefzil [cefprozil]    Health Maintenance:  Vaccinations deferred given acute symptoms    ROS:  Gen: no fevers/chills, no changes in weight  Eyes: no changes in vision  ENT: no sore throat, no hearing loss, no bloody nose  Pulm: no SOB  CV: no chest pain      Objective:       Exam:  /70 (BP Location: Left arm, Patient Position: Sitting, BP Cuff Size: Adult)   Pulse 89   Temp 37.1 °C (98.7 °F) (Temporal)   Resp 16   Ht 1.727 m (5' 8\")   Wt 65.8 kg (145 lb)   LMP 04/02/2008   SpO2 97%   BMI 22.05 kg/m²  Body mass index is 22.05 kg/m².    Gen: Alert and oriented, No apparent distress  Lungs: Normal effort, CTA bilaterally, no wheezes, rhonchi, or rales  CV: Regular rate and rhythm, no murmurs, rubs, or gallops       Assessment & Plan:     61 y.o. female with the following -     1. Cough  Chronic intermittent cough with mucus production in the morning suggestive of mild COPD exacerbation with post-nasal drip. Patient has completed two courses of antibiotics, recommended five day course prednisone per below. Nasal saline rinse followed by flonase BID to help decrease post-nasal drip.   - CoV-2 and Flu A/B by PCR (24 hour In-House): Collect NP swab in VTM; Future  - POCT SARS-COV Antigen SARA (Symptomatic Only)    2. Palpitations  - zio patch    Other orders  - predniSONE (DELTASONE) 20 MG Tab; Take 2 Tablets by mouth every day.  Dispense: 10 Tablet; Refill: 0      Return in about 1 month (around 2/5/2022).    Please note this dictation was created using voice recognition software. I have made every " reasonable attempt to correct obvious errors, but I expect there may be errors of grammar, and possibly content, that I did not discover before finalizing the note.

## 2022-01-06 LAB
FLUAV RNA SPEC QL NAA+PROBE: NEGATIVE
FLUBV RNA SPEC QL NAA+PROBE: NEGATIVE
SARS-COV-2 RNA RESP QL NAA+PROBE: NOTDETECTED
SPECIMEN SOURCE: NORMAL

## 2022-01-13 ENCOUNTER — OFFICE VISIT (OUTPATIENT)
Dept: MEDICAL GROUP | Facility: MEDICAL CENTER | Age: 62
End: 2022-01-13
Payer: COMMERCIAL

## 2022-01-13 VITALS
HEART RATE: 67 BPM | BODY MASS INDEX: 21.98 KG/M2 | WEIGHT: 145 LBS | OXYGEN SATURATION: 99 % | DIASTOLIC BLOOD PRESSURE: 58 MMHG | SYSTOLIC BLOOD PRESSURE: 104 MMHG | HEIGHT: 68 IN | RESPIRATION RATE: 12 BRPM | TEMPERATURE: 98.3 F

## 2022-01-13 DIAGNOSIS — J01.00 SUBACUTE MAXILLARY SINUSITIS: ICD-10-CM

## 2022-01-13 DIAGNOSIS — H65.93 FLUID LEVEL BEHIND TYMPANIC MEMBRANE OF BOTH EARS: ICD-10-CM

## 2022-01-13 DIAGNOSIS — R00.2 PALPITATIONS: ICD-10-CM

## 2022-01-13 PROCEDURE — 99214 OFFICE O/P EST MOD 30 MIN: CPT | Performed by: FAMILY MEDICINE

## 2022-01-13 RX ORDER — AMOXICILLIN AND CLAVULANATE POTASSIUM 562.5; 437.5; 62.5 MG/1; MG/1; MG/1
2 TABLET, MULTILAYER, EXTENDED RELEASE ORAL 2 TIMES DAILY
Qty: 28 TABLET | Refills: 0 | Status: SHIPPED | OUTPATIENT
Start: 2022-01-13 | End: 2022-01-20

## 2022-01-13 ASSESSMENT — FIBROSIS 4 INDEX: FIB4 SCORE: 0.96

## 2022-01-13 NOTE — PROGRESS NOTES
Subjective:     CC: sinusitis follow-up    PCP: Dr. Gore who is out of the office today    HPI:   Sita presents today with    Pt was seen by her PCP on 1/5/22 due to having cough with green mucous.  Symptoms started after she had ENT throat scope 11/2021.  She initially went to [facility] and was given augmentin.  She then went to [facility] and was treated as a viral infection.  She then went to [facility] and was given doxycycline and then levoquin which she did not take due to concern re: tendon tear.  Her PCP on 1/5/22 recommended 5 day course of prednisone and post nasal drip treatment with flonase BID.    Since last visit, she states that her ears continue to be congested with sensitivity to sound.  Feels like she has fluid in her ears.  She has no nasal congestion or runny nose, but still has pressure in her ears.  She no longer is having a cough or green mucous.  She has been using saline spray TID and flonase BID without improvement in symptoms.  States that she has had only temporary relief with antibiotics in the past.  She has seen Dr. Gina Amato, ENT in the past for a thyroid nodule and has a follow-up appointment 3/2022.    No chest congestion, cough, fevers or chills.  No ear discharge, sore throat, post nasal drip, nausea or vomiting.    She continues to get palpitations daily that last hours at a time.  States symptoms occur when her heart rate is slow and this has occurred for year.  Recently, she has noticed symptoms occur when standing in lines and at rest.  Symptoms worsened about 2 months ago without clear trigger.  Denies any emotional connection.  No abnormal bleeding, no skin/hair or nail changes.  States palpitations are same as her last PCP appointment.  She drinks one diet pepsi per day, no other caffeine use.      Past Medical History:   Diagnosis Date   • ASTHMA    • COPD (chronic obstructive pulmonary disease) ([facility]) 10/4/2010   • COPD (chronic obstructive pulmonary disease)  "([facility]) 10/4/2010   • Eczema 10/4/2010   • Onychomycosis of toenail 4/14/2021   • Osteopenia 10/4/2010   • Recurrent acute sinusitis        Social History     Tobacco Use   • Smoking status: Former Smoker     Packs/day: 0.00     Years: 15.00     Pack years: 0.00     Types: Cigarettes   • Smokeless tobacco: Never Used   Vaping Use   • Vaping Use: Never used   Substance Use Topics   • Alcohol use: Never   • Drug use: No       Current Outpatient Medications Ordered in Epic   Medication Sig Dispense Refill   • amoxicillin-clavulanate XR (AUGMENTIN XR) 1000-62.5 MG Take 2 Tablets by mouth 2 times a day for 7 days. 28 Tablet 0   • pantoprazole (PROTONIX) 40 MG Tablet Delayed Response TAKE 1 TABLET BY MOUTH EVERY DAY 90 Tablet 0   • Calcium-Vitamin D-Vitamin K (VIACTIV CALCIUM PLUS D) 650-12.5-40 MG-MCG-MCG Chew Tab Chew.     • albuterol 108 (90 Base) MCG/ACT Aero Soln inhalation aerosol      • Apoaequorin (PREVAGEN PO) Take  by mouth.     • SYMBICORT 160-4.5 MCG/ACT Aerosol 1 Puff 2 times a day.       No current [facility]-ordered facility-administered medications on file.       Allergies:  Cefzil [cefprozil]    ROS:  Gen: no fevers/chills  Eyes: no changes in vision  ENT: no sore throat, no bloody nose  Pulm: no sob, no cough  CV: no chest pain, no palpitations  GI: no nausea/vomiting, no diarrhea  : no dysuria  Neuro: no headaches, no numbness/tingling      Objective:       Exam:  /58 (BP Location: Right arm, Patient Position: Sitting, BP Cuff Size: Adult)   Pulse 67   Temp 36.8 °C (98.3 °F) (Temporal)   Resp 12   Ht 1.727 m (5' 8\")   Wt 65.8 kg (145 lb)   LMP 04/02/2008   SpO2 99%   BMI 22.05 kg/m²  Body mass index is 22.05 kg/m².    Gen: Alert and oriented, No apparent distress.  HEENT: Tms with effusion bilaterally but no erythema.  Dull light reflexes bilateral TMs. +Maxillary sinus TTP.  Nasal turbinates erythematous with clear/yellow nasal discharge.  OP without erythema, exudates or post nasal " drainage  Neck: Neck is supple without lymphadenopathy.  Lungs: Normal effort, CTA bilaterally, no wheezes, rhonchi, or rales  CV: Regular rate and rhythm. No murmurs, rubs, or gallops.  Ext: No clubbing, cyanosis, edema.    Assessment & Plan:     61 y.o. female with the following -     1. Palpitations  Chronic issue for years with increased frequency over the last 2 months.  No associated SOB, chest pain, weakness or numbness.  Will check labs and pt to get zio patch as ordered by her PCP on 1/5/22.  Follow-up and ER precautions given. Patient expressed understanding and agreement with plan.  - CBC WITH DIFFERENTIAL; Future  - Comp Metabolic Panel; Future  - TSH WITH REFLEX TO FT4; Future    2. Subacute maxillary sinusitis  Chronic issue since 11/2021 with improved cough but continued maxillary sinus TTP.  Continue flonase and nasal saline, hold decongestants given Se risk given her palpitations, start high dose augmentin and urgent ENT referral given. Encouraged probiotic use. Follow-up precautions discussed.  - amoxicillin-clavulanate XR (AUGMENTIN XR) 1000-62.5 MG; Take 2 Tablets by mouth 2 times a day for 7 days.  Dispense: 28 Tablet; Refill: 0  - DX-SINUSES-PARANASAL COMPLETE 3+; Future  - Referral to ENT    3. Fluid level behind tympanic membrane of both ears  Chronic issue since 11/2021, persistent.  Will check imaging and ENT referral given.  Encouraged continue flonase and nasal saline.  Follow-up precautions given. Patient expressed understanding and agreement with plan.  - DX-SINUSES-PARANASAL COMPLETE 3+; Future  - Referral to ENT      Return if symptoms worsen or fail to improve.    Please note that this dictation was created using voice recognition software. I have made every reasonable attempt to correct obvious errors, but I expect that there are errors of grammar and possibly content that I did not discover before finalizing the note.

## 2022-01-19 ENCOUNTER — NON-PROVIDER VISIT (OUTPATIENT)
Dept: CARDIOLOGY | Facility: MEDICAL CENTER | Age: 62
End: 2022-01-19
Payer: COMMERCIAL

## 2022-01-19 DIAGNOSIS — I47.10 SVT (SUPRAVENTRICULAR TACHYCARDIA) (HCC): ICD-10-CM

## 2022-01-19 DIAGNOSIS — I49.3 PVCS (PREMATURE VENTRICULAR CONTRACTIONS): ICD-10-CM

## 2022-01-19 DIAGNOSIS — I49.1 PREMATURE ATRIAL CONTRACTIONS: ICD-10-CM

## 2022-01-19 DIAGNOSIS — R00.0 SINUS TACHYCARDIA: ICD-10-CM

## 2022-01-19 NOTE — PROGRESS NOTES
Patient enrolled in the 14 day ePatch Holter monitoring program per Merle Gore MD.  >Office hook-up, serial # [id number].  >Currently pending EOS.

## 2022-01-31 RX ORDER — PANTOPRAZOLE SODIUM 40 MG/1
TABLET, DELAYED RELEASE ORAL
Qty: 90 TABLET | Refills: 0 | Status: SHIPPED | OUTPATIENT
Start: 2022-01-31 | End: 2022-05-09

## 2022-02-08 ENCOUNTER — TELEPHONE (OUTPATIENT)
Dept: CARDIOLOGY | Facility: MEDICAL CENTER | Age: 62
End: 2022-02-08

## 2022-02-08 DIAGNOSIS — R00.2 PALPITATIONS: ICD-10-CM

## 2022-02-09 PROCEDURE — 93228 REMOTE 30 DAY ECG REV/REPORT: CPT | Performed by: INTERNAL MEDICINE

## 2022-03-01 ENCOUNTER — OFFICE VISIT (OUTPATIENT)
Dept: MEDICAL GROUP | Facility: MEDICAL CENTER | Age: 62
End: 2022-03-01
Payer: COMMERCIAL

## 2022-03-01 VITALS
RESPIRATION RATE: 12 BRPM | SYSTOLIC BLOOD PRESSURE: 86 MMHG | HEIGHT: 68 IN | DIASTOLIC BLOOD PRESSURE: 56 MMHG | OXYGEN SATURATION: 95 % | HEART RATE: 82 BPM | TEMPERATURE: 99.4 F | WEIGHT: 149.69 LBS | BODY MASS INDEX: 22.69 KG/M2

## 2022-03-01 DIAGNOSIS — M81.0 AGE-RELATED OSTEOPOROSIS WITHOUT CURRENT PATHOLOGICAL FRACTURE: ICD-10-CM

## 2022-03-01 DIAGNOSIS — R73.01 ELEVATED FASTING GLUCOSE: ICD-10-CM

## 2022-03-01 DIAGNOSIS — E78.5 DYSLIPIDEMIA: ICD-10-CM

## 2022-03-01 DIAGNOSIS — Z12.31 ENCOUNTER FOR SCREENING MAMMOGRAM FOR MALIGNANT NEOPLASM OF BREAST: ICD-10-CM

## 2022-03-01 DIAGNOSIS — E55.9 VITAMIN D DEFICIENCY: ICD-10-CM

## 2022-03-01 DIAGNOSIS — R00.2 PALPITATIONS: ICD-10-CM

## 2022-03-01 PROCEDURE — 99214 OFFICE O/P EST MOD 30 MIN: CPT | Performed by: FAMILY MEDICINE

## 2022-03-01 RX ORDER — ALENDRONATE SODIUM 70 MG/1
70 TABLET ORAL
Qty: 4 TABLET | Refills: 3 | Status: SHIPPED | OUTPATIENT
Start: 2022-03-01 | End: 2022-03-30

## 2022-03-01 ASSESSMENT — FIBROSIS 4 INDEX: FIB4 SCORE: 0.96

## 2022-03-01 NOTE — ASSESSMENT & PLAN NOTE
"Patient reports a \"skippy heart\" for many years however reports palpitations increasing in frequency which are now associated with shortness of breath. Palpitations last a few minutes, occur more frequently in the evenings. She also reports racing heart with mild exertion ie walking on flat ground. No chest pain, LE edema, orthopnea, PND.    Zio patch:     No symptoms reported   * No atrial fibrillation, high degree heart block, ventricular tachycardia, or pauses.   * 3 episodes of asymptomatic supraventricular tachycardia, up to 5 beats.   * Rare PACs/PVCs   * Tachycardia up to 153bpm on 12:24PM on 1/26/2022 - correlate clinically, appeared sinus with artifact  "

## 2022-03-01 NOTE — PROGRESS NOTES
"Subjective:     CC: follow up palpitations    HPI:   Sita presents today with:    Palpitations  Patient reports a \"skippy heart\" for many years however reports palpitations increasing in frequency which are now associated with shortness of breath. Palpitations last a few minutes, occur more frequently in the evenings. She also reports racing heart with mild exertion ie walking on flat ground. No chest pain, LE edema, orthopnea, PND.    Zio patch:     No symptoms reported   * No atrial fibrillation, high degree heart block, ventricular tachycardia, or pauses.   * 3 episodes of asymptomatic supraventricular tachycardia, up to 5 beats.   * Rare PACs/PVCs   * Tachycardia up to 153bpm on 12:24PM on 1/26/2022 - correlate clinically, appeared sinus with artifact    Age-related osteoporosis without current pathological fracture  DEXA 4/14/21 demonstrates osteoporosis. Sita is interested in starting bisphosphonate therapy in addition to weight bearing exercise, calcium and vit D supplementation.      DEXA 4/14/21:   10-year Probability of Fracture:  Major Osteoporotic     16.0%  Hip     5.0%      Past Medical History:   Diagnosis Date   • ASTHMA    • COPD (chronic obstructive pulmonary disease) ([facility]) 10/4/2010   • COPD (chronic obstructive pulmonary disease) ([facility]) 10/4/2010   • Eczema 10/4/2010   • Onychomycosis of toenail 4/14/2021   • Osteopenia 10/4/2010   • Recurrent acute sinusitis        Social History     Tobacco Use   • Smoking status: Former Smoker     Packs/day: 0.00     Years: 15.00     Pack years: 0.00     Types: Cigarettes   • Smokeless tobacco: Never Used   Vaping Use   • Vaping Use: Never used   Substance Use Topics   • Alcohol use: Never   • Drug use: No       Current Outpatient Medications Ordered in Epic   Medication Sig Dispense Refill   • alendronate (FOSAMAX) 70 MG Tab Take 1 Tablet by mouth every 7 days. 4 Tablet 3   • pantoprazole (PROTONIX) 40 MG Tablet Delayed Response TAKE 1 TABLET BY MOUTH EVERY " "DAY 90 Tablet 0   • Calcium-Vitamin D-Vitamin K (VIACTIV CALCIUM PLUS D) 650-12.5-40 MG-MCG-MCG Chew Tab Chew.     • albuterol 108 (90 Base) MCG/ACT Aero Soln inhalation aerosol      • Apoaequorin (PREVAGEN PO) Take  by mouth.     • SYMBICORT 160-4.5 MCG/ACT Aerosol 1 Puff 2 times a day.       No current [facility]-ordered facility-administered medications on file.       Allergies:  Cefzil [cefprozil]    Health Maintenance:   ROS:  Gen: no fevers/chills, no changes in weight  Eyes: no changes in vision  ENT: no sore throat, no hearing loss, no bloody nose  Pulm: no SOB  CV: no chest pain        Objective:       Exam:  BP (!) 86/56 (BP Location: Left arm, Patient Position: Sitting, BP Cuff Size: Adult)   Pulse 82   Temp 37.4 °C (99.4 °F) (Temporal)   Resp 12   Ht 1.727 m (5' 8\")   Wt 67.9 kg (149 lb 11.1 oz)   LMP 04/02/2008   SpO2 95%   BMI 22.76 kg/m²  Body mass index is 22.76 kg/m².    Gen: Alert and oriented, No apparent distress  Lungs: Normal effort, CTA bilaterally, no wheezes, rhonchi, or rales  CV: Regular rate and rhythm, no murmurs, rubs, or gallops      Assessment & Plan:     61 y.o. female with the following -     1. Dyslipidemia  - Comp Metabolic Panel; Future  - Lipid Profile; Future  - CBC WITH DIFFERENTIAL; Future    2. Vitamin D deficiency  - VITAMIN D,25 HYDROXY; Future    3. Elevated fasting glucose  - HEMOGLOBIN A1C; Future    4. Palpitations  Discussed symptoms could be secondary to PVCs vs SVT; she reports symptoms are worsening and would like consultation with cardiology.  - REFERRAL TO CARDIOLOGY    5. Age-related osteoporosis without current pathological fracture  - alendronate (FOSAMAX) 70 MG Tab; Take 1 Tablet by mouth every 7 days.  Dispense: 4 Tablet; Refill: 3    Please note this dictation was created using voice recognition software. I have made every reasonable attempt to correct obvious errors, but I expect there may be errors of grammar, and possibly content, that I did not " discover before finalizing the note.

## 2022-03-01 NOTE — ASSESSMENT & PLAN NOTE
DEXA 4/14/21 demonstrates osteoporosis. Sita is interested in starting bisphosphonate therapy in addition to weight bearing exercise, calcium and vit D supplementation.      DEXA 4/14/21:   10-year Probability of Fracture:  Major Osteoporotic     16.0%  Hip     5.0%

## 2022-03-17 ENCOUNTER — HOSPITAL ENCOUNTER (OUTPATIENT)
Dept: LAB | Facility: MEDICAL CENTER | Age: 62
End: 2022-03-17
Attending: FAMILY MEDICINE
Payer: COMMERCIAL

## 2022-03-17 DIAGNOSIS — R00.2 PALPITATIONS: ICD-10-CM

## 2022-03-17 DIAGNOSIS — R73.01 ELEVATED FASTING GLUCOSE: ICD-10-CM

## 2022-03-17 DIAGNOSIS — E55.9 VITAMIN D DEFICIENCY: ICD-10-CM

## 2022-03-17 DIAGNOSIS — E78.5 DYSLIPIDEMIA: ICD-10-CM

## 2022-03-17 LAB
25(OH)D3 SERPL-MCNC: 52 NG/ML (ref 30–100)
ALBUMIN SERPL BCP-MCNC: 4.4 G/DL (ref 3.2–4.9)
ALBUMIN SERPL BCP-MCNC: 4.5 G/DL (ref 3.2–4.9)
ALBUMIN/GLOB SERPL: 2 G/DL
ALBUMIN/GLOB SERPL: 2.1 G/DL
ALP SERPL-CCNC: 146 U/L (ref 30–99)
ALP SERPL-CCNC: 148 U/L (ref 30–99)
ALT SERPL-CCNC: 10 U/L (ref 2–50)
ALT SERPL-CCNC: 12 U/L (ref 2–50)
ANION GAP SERPL CALC-SCNC: 10 MMOL/L (ref 7–16)
ANION GAP SERPL CALC-SCNC: 9 MMOL/L (ref 7–16)
AST SERPL-CCNC: 14 U/L (ref 12–45)
AST SERPL-CCNC: 15 U/L (ref 12–45)
BASOPHILS # BLD AUTO: 1.1 % (ref 0–1.8)
BASOPHILS # BLD AUTO: 1.4 % (ref 0–1.8)
BASOPHILS # BLD: 0.07 K/UL (ref 0–0.12)
BASOPHILS # BLD: 0.08 K/UL (ref 0–0.12)
BILIRUB SERPL-MCNC: 0.4 MG/DL (ref 0.1–1.5)
BILIRUB SERPL-MCNC: 0.4 MG/DL (ref 0.1–1.5)
BUN SERPL-MCNC: 16 MG/DL (ref 8–22)
BUN SERPL-MCNC: 16 MG/DL (ref 8–22)
CALCIUM SERPL-MCNC: 10.2 MG/DL (ref 8.5–10.5)
CALCIUM SERPL-MCNC: 10.2 MG/DL (ref 8.5–10.5)
CHLORIDE SERPL-SCNC: 105 MMOL/L (ref 96–112)
CHLORIDE SERPL-SCNC: 106 MMOL/L (ref 96–112)
CHOLEST SERPL-MCNC: 204 MG/DL (ref 100–199)
CO2 SERPL-SCNC: 28 MMOL/L (ref 20–33)
CO2 SERPL-SCNC: 29 MMOL/L (ref 20–33)
CREAT SERPL-MCNC: 0.63 MG/DL (ref 0.5–1.4)
CREAT SERPL-MCNC: 0.63 MG/DL (ref 0.5–1.4)
EOSINOPHIL # BLD AUTO: 0.25 K/UL (ref 0–0.51)
EOSINOPHIL # BLD AUTO: 0.27 K/UL (ref 0–0.51)
EOSINOPHIL NFR BLD: 4.1 % (ref 0–6.9)
EOSINOPHIL NFR BLD: 4.6 % (ref 0–6.9)
ERYTHROCYTE [DISTWIDTH] IN BLOOD BY AUTOMATED COUNT: 43.1 FL (ref 35.9–50)
ERYTHROCYTE [DISTWIDTH] IN BLOOD BY AUTOMATED COUNT: 43.5 FL (ref 35.9–50)
EST. AVERAGE GLUCOSE BLD GHB EST-MCNC: 114 MG/DL
GFR SERPLBLD CREATININE-BSD FMLA CKD-EPI: 99 ML/MIN/1.73 M 2
GFR SERPLBLD CREATININE-BSD FMLA CKD-EPI: 99 ML/MIN/1.73 M 2
GLOBULIN SER CALC-MCNC: 2.1 G/DL (ref 1.9–3.5)
GLOBULIN SER CALC-MCNC: 2.2 G/DL (ref 1.9–3.5)
GLUCOSE SERPL-MCNC: 108 MG/DL (ref 65–99)
GLUCOSE SERPL-MCNC: 108 MG/DL (ref 65–99)
HBA1C MFR BLD: 5.6 % (ref 4–5.6)
HCT VFR BLD AUTO: 44.7 % (ref 37–47)
HCT VFR BLD AUTO: 45.3 % (ref 37–47)
HDLC SERPL-MCNC: 49 MG/DL
HGB BLD-MCNC: 14.4 G/DL (ref 12–16)
HGB BLD-MCNC: 15.7 G/DL (ref 12–16)
IMM GRANULOCYTES # BLD AUTO: 0.01 K/UL (ref 0–0.11)
IMM GRANULOCYTES # BLD AUTO: 0.02 K/UL (ref 0–0.11)
IMM GRANULOCYTES NFR BLD AUTO: 0.2 % (ref 0–0.9)
IMM GRANULOCYTES NFR BLD AUTO: 0.3 % (ref 0–0.9)
LDLC SERPL CALC-MCNC: 140 MG/DL
LYMPHOCYTES # BLD AUTO: 1.52 K/UL (ref 1–4.8)
LYMPHOCYTES # BLD AUTO: 1.64 K/UL (ref 1–4.8)
LYMPHOCYTES NFR BLD: 26 % (ref 22–41)
LYMPHOCYTES NFR BLD: 26.8 % (ref 22–41)
MCH RBC QN AUTO: 29.7 PG (ref 27–33)
MCH RBC QN AUTO: 31.7 PG (ref 27–33)
MCHC RBC AUTO-ENTMCNC: 32.2 G/DL (ref 33.6–35)
MCHC RBC AUTO-ENTMCNC: 34.7 G/DL (ref 33.6–35)
MCV RBC AUTO: 91.3 FL (ref 81.4–97.8)
MCV RBC AUTO: 92.2 FL (ref 81.4–97.8)
MONOCYTES # BLD AUTO: 0.69 K/UL (ref 0–0.85)
MONOCYTES # BLD AUTO: 0.7 K/UL (ref 0–0.85)
MONOCYTES NFR BLD AUTO: 11.3 % (ref 0–13.4)
MONOCYTES NFR BLD AUTO: 12 % (ref 0–13.4)
NEUTROPHILS # BLD AUTO: 3.27 K/UL (ref 2–7.15)
NEUTROPHILS # BLD AUTO: 3.45 K/UL (ref 2–7.15)
NEUTROPHILS NFR BLD: 55.8 % (ref 44–72)
NEUTROPHILS NFR BLD: 56.4 % (ref 44–72)
NRBC # BLD AUTO: 0 K/UL
NRBC # BLD AUTO: 0 K/UL
NRBC BLD-RTO: 0 /100 WBC
NRBC BLD-RTO: 0 /100 WBC
PLATELET # BLD AUTO: 271 K/UL (ref 164–446)
PLATELET # BLD AUTO: 297 K/UL (ref 164–446)
PMV BLD AUTO: 10.7 FL (ref 9–12.9)
PMV BLD AUTO: 10.9 FL (ref 9–12.9)
POTASSIUM SERPL-SCNC: 4.6 MMOL/L (ref 3.6–5.5)
POTASSIUM SERPL-SCNC: 4.7 MMOL/L (ref 3.6–5.5)
PROT SERPL-MCNC: 6.6 G/DL (ref 6–8.2)
PROT SERPL-MCNC: 6.6 G/DL (ref 6–8.2)
RBC # BLD AUTO: 4.85 M/UL (ref 4.2–5.4)
RBC # BLD AUTO: 4.96 M/UL (ref 4.2–5.4)
SODIUM SERPL-SCNC: 143 MMOL/L (ref 135–145)
SODIUM SERPL-SCNC: 144 MMOL/L (ref 135–145)
TRIGL SERPL-MCNC: 76 MG/DL (ref 0–149)
TSH SERPL DL<=0.005 MIU/L-ACNC: 1.94 UIU/ML (ref 0.38–5.33)
WBC # BLD AUTO: 5.9 K/UL (ref 4.8–10.8)
WBC # BLD AUTO: 6.1 K/UL (ref 4.8–10.8)

## 2022-03-17 PROCEDURE — 84443 ASSAY THYROID STIM HORMONE: CPT

## 2022-03-17 PROCEDURE — 85025 COMPLETE CBC W/AUTO DIFF WBC: CPT | Mod: 91

## 2022-03-17 PROCEDURE — 80053 COMPREHEN METABOLIC PANEL: CPT

## 2022-03-17 PROCEDURE — 80061 LIPID PANEL: CPT

## 2022-03-17 PROCEDURE — 82306 VITAMIN D 25 HYDROXY: CPT

## 2022-03-17 PROCEDURE — 85025 COMPLETE CBC W/AUTO DIFF WBC: CPT

## 2022-03-17 PROCEDURE — 80053 COMPREHEN METABOLIC PANEL: CPT | Mod: 91

## 2022-03-17 PROCEDURE — 36415 COLL VENOUS BLD VENIPUNCTURE: CPT

## 2022-03-17 PROCEDURE — 83036 HEMOGLOBIN GLYCOSYLATED A1C: CPT

## 2022-03-30 RX ORDER — ALENDRONATE SODIUM 70 MG/1
TABLET ORAL
Qty: 12 TABLET | Refills: 3 | Status: SHIPPED | OUTPATIENT
Start: 2022-03-30 | End: 2023-02-27 | Stop reason: SDUPTHER

## 2022-04-07 ENCOUNTER — OFFICE VISIT (OUTPATIENT)
Dept: URGENT CARE | Facility: CLINIC | Age: 62
End: 2022-04-07
Payer: COMMERCIAL

## 2022-04-07 VITALS
WEIGHT: 147.4 LBS | HEART RATE: 72 BPM | TEMPERATURE: 98.8 F | RESPIRATION RATE: 16 BRPM | DIASTOLIC BLOOD PRESSURE: 58 MMHG | SYSTOLIC BLOOD PRESSURE: 98 MMHG | HEIGHT: 69 IN | OXYGEN SATURATION: 96 % | BODY MASS INDEX: 21.83 KG/M2

## 2022-04-07 DIAGNOSIS — J44.1 COPD EXACERBATION (HCC): ICD-10-CM

## 2022-04-07 DIAGNOSIS — J22 ACUTE RESPIRATORY INFECTION: ICD-10-CM

## 2022-04-07 PROCEDURE — 99214 OFFICE O/P EST MOD 30 MIN: CPT | Performed by: FAMILY MEDICINE

## 2022-04-07 RX ORDER — AMOXICILLIN 500 MG/1
TABLET, FILM COATED ORAL
COMMUNITY
Start: 2022-03-09 | End: 2022-04-12

## 2022-04-07 RX ORDER — METHYLPREDNISOLONE 4 MG/1
TABLET ORAL
Qty: 21 TABLET | Refills: 0 | Status: SHIPPED | OUTPATIENT
Start: 2022-04-07 | End: 2022-04-12

## 2022-04-07 RX ORDER — DOXYCYCLINE HYCLATE 100 MG
100 TABLET ORAL 2 TIMES DAILY
Qty: 14 TABLET | Refills: 0 | Status: SHIPPED | OUTPATIENT
Start: 2022-04-07 | End: 2022-04-14

## 2022-04-07 ASSESSMENT — ENCOUNTER SYMPTOMS
MYALGIAS: 0
COUGH: 1
DIZZINESS: 0
FEVER: 0
SORE THROAT: 0
CHILLS: 0
NAUSEA: 0
SHORTNESS OF BREATH: 1
VOMITING: 0

## 2022-04-07 ASSESSMENT — FIBROSIS 4 INDEX: FIB4 SCORE: 0.94

## 2022-04-07 ASSESSMENT — COPD QUESTIONNAIRES: COPD: 1

## 2022-04-07 NOTE — PROGRESS NOTES
Subjective:   Sita Medina is a 61 y.o. female who presents for Congestion (X5days, ) and Cough (X5day, Productive cough)        Cough  This is a new (Reports cough over the past 5 days, history of COPD with recent increase use of albuterol to twice a day from baseline of 0 times a day) problem. The current episode started in the past 7 days. The problem has been gradually worsening. The cough is productive of sputum. Associated symptoms include shortness of breath. Pertinent negatives include no chills, fever, myalgias, rash or sore throat. Associated symptoms comments: There has been community-wide COVID-19 exposure, the patient denies known direct COVID-19 exposure  . She has tried a beta-agonist inhaler for the symptoms. The treatment provided mild relief. Her past medical history is significant for asthma and COPD.     PMH:  has a past medical history of ASTHMA, COPD (chronic obstructive pulmonary disease) ([facility]) (10/4/2010), COPD (chronic obstructive pulmonary disease) ([facility]) (10/4/2010), Eczema (10/4/2010), Onychomycosis of toenail (4/14/2021), Osteopenia (10/4/2010), and Recurrent acute sinusitis.    She has no past medical history of CAD (coronary artery disease), Cancer ([facility]), Congestive heart failure ([facility]), Diabetes, Hypertension, Liver disease, Psychiatric disorder, Renal disorder, Seizure disorder ([facility]), or Stroke ([facility]).  MEDS:   Current Outpatient Medications:   •  Fluticasone Propionate (FLONASE NA), Administer  into affected nostril(S)., Disp: , Rfl:   •  doxycycline (VIBRAMYCIN) 100 MG Tab, Take 1 Tablet by mouth 2 times a day for 7 days., Disp: 14 Tablet, Rfl: 0  •  methylPREDNISolone (MEDROL DOSEPAK) 4 MG Tablet Therapy Pack, Follow schedule on package instructions., Disp: 21 Tablet, Rfl: 0  •  alendronate (FOSAMAX) 70 MG Tab, TAKE 1 TABLET BY MOUTH ONE TIME PER WEEK, Disp: 12 Tablet, Rfl: 3  •  pantoprazole (PROTONIX) 40 MG Tablet Delayed Response, TAKE 1 TABLET BY MOUTH EVERY DAY, Disp:  "90 Tablet, Rfl: 0  •  Calcium-Vitamin D-Vitamin K (VIACTIV CALCIUM PLUS D) 650-12.5-40 MG-MCG-MCG Chew Tab, Chew., Disp: , Rfl:   •  albuterol 108 (90 Base) MCG/ACT Aero Soln inhalation aerosol, , Disp: , Rfl:   •  Apoaequorin (PREVAGEN PO), Take  by mouth., Disp: , Rfl:   •  SYMBICORT 160-4.5 MCG/ACT Aerosol, 1 Puff 2 times a day., Disp: , Rfl:   •  Amoxicillin 500 MG Tab, TAKE 1 TABLET BY MOUTH THREE TIMES A DAY UNTIL GONE (Patient not taking: Reported on 4/7/2022), Disp: , Rfl:   ALLERGIES:   Allergies   Allergen Reactions   • Cefzil [Cefprozil]      nausea     SURGHX: History reviewed. No pertinent surgical history.  SOCHX:  reports that she has quit smoking. Her smoking use included cigarettes. She smoked 0.00 packs per day for 15.00 years. She has never used smokeless tobacco. She reports that she does not drink alcohol and does not use drugs.  FH:   Family History   Problem Relation Age of Onset   • Cancer Mother         breast   • Allergies Mother    • Diabetes Father    • Cancer Brother         esophageal cancer     Review of Systems   Constitutional: Negative for chills and fever.   HENT: Negative for sore throat.    Respiratory: Positive for cough and shortness of breath.    Gastrointestinal: Negative for nausea and vomiting.   Musculoskeletal: Negative for myalgias.   Skin: Negative for rash.   Neurological: Negative for dizziness.        Objective:   BP (!) 98/58   Pulse 72   Temp 37.1 °C (98.8 °F) (Temporal)   Resp 16   Ht 1.753 m (5' 9\")   Wt 66.9 kg (147 lb 6.4 oz)   LMP 04/02/2008   SpO2 96%   BMI 21.77 kg/m²   Physical Exam  Vitals and nursing note reviewed.   Constitutional:       General: She is not in acute distress.     Appearance: She is well-developed.   HENT:      Head: Normocephalic and atraumatic.      Right Ear: External ear normal.      Left Ear: External ear normal.      Nose: Nose normal.      Mouth/Throat:      Mouth: Mucous membranes are moist.      Pharynx: No oropharyngeal " exudate.   Eyes:      Conjunctiva/sclera: Conjunctivae normal.   Cardiovascular:      Rate and Rhythm: Normal rate.   Pulmonary:      Effort: Pulmonary effort is normal. No respiratory distress.      Breath sounds: Wheezing (Mild expiratory) and rhonchi (Few) present.      Comments: Speaking full sentences  Abdominal:      General: There is no distension.   Musculoskeletal:         General: Normal range of motion.   Skin:     General: Skin is warm and dry.   Neurological:      General: No focal deficit present.      Mental Status: She is alert and oriented to person, place, and time. Mental status is at baseline.      Gait: Gait (gait at baseline) normal.   Psychiatric:         Judgment: Judgment normal.           Assessment/Plan:   1. Acute respiratory infection  - doxycycline (VIBRAMYCIN) 100 MG Tab; Take 1 Tablet by mouth 2 times a day for 7 days.  Dispense: 14 Tablet; Refill: 0    2. COPD exacerbation (HCC)  - methylPREDNISolone (MEDROL DOSEPAK) 4 MG Tablet Therapy Pack; Follow schedule on package instructions.  Dispense: 21 Tablet; Refill: 0    Other orders  - Amoxicillin 500 MG Tab; TAKE 1 TABLET BY MOUTH THREE TIMES A DAY UNTIL GONE (Patient not taking: Reported on 4/7/2022)  - Fluticasone Propionate (FLONASE NA); Administer  into affected nostril(S).    Medical decision-making/course: The patient remained afebrile, relatively hemodynamically and neurologically stable with no evidence of respiratory compromise throughout the urgent care course.  There was no immediate clinical indication for the necessity of emergency department evaluation or inpatient admission and the patient was amendable to a trial of outpatient management.      In the course of preparing for this visit with review of the pertinent past medical history, recent and past clinic visits, current medications, and performing chart, immunization, medical history and medication reconciliation, and in the further course of obtaining the current  history pertinent to the clinic visit today, performing an exam and evaluation, ordering and independently evaluating labs, tests  , and/or procedures including recheck of blood pressure which were noted to be at baseline from previous visits with primary care provider and previous urgent care visits, prescribing any recommended new medications as noted above, providing any pertinent counseling and education and recommending further coordination of care including recommendations to return for any persistent or worsening symptoms, at least  20 minutes of total time were spent during this encounter.      Discussed close monitoring, return precautions, and supportive measures of maintaining adequate fluid hydration and caloric intake, relative rest and symptom management as needed for pain and/or fever.    Differential diagnosis, natural history, supportive care, and indications for immediate follow-up discussed.     Advised the patient to follow-up with the primary care physician for recheck, reevaluation, and consideration of further management.    Please note that this dictation was created using voice recognition software. I have worked with consultants from the vendor as well as technical experts from [facility] [email or web address] to optimize the interface. I have made every reasonable attempt to correct obvious errors, but I expect that there are errors of grammar and possibly content that I did not discover before finalizing the note.

## 2022-04-07 NOTE — PATIENT INSTRUCTIONS
Bronchitis  Bronchitis is a problem of the air tubes leading to your lungs. This problem makes it hard for air to get in and out of the lungs. You may cough a lot because your air tubes are narrow. Going without care can cause lasting (chronic) bronchitis.  HOME CARE   · Drink enough fluids to keep your pee (urine) clear or pale yellow.  · Use a cool mist humidifier.  · Quit smoking if you smoke. If you keep smoking, the bronchitis might not get better.  · Only take medicine as told by your doctor.  GET HELP RIGHT AWAY IF:   · Coughing keeps you awake.  · You start to wheeze.  · You become more sick or weak.  · You have a hard time breathing or get short of breath.  · You cough up blood.  · Coughing lasts more than 2 weeks.  · You have a fever.  · Your baby is older than 3 months with a rectal temperature of 102° F (38.9° C) or higher.  · Your baby is 3 months old or younger with a rectal temperature of 100.4° F (38° C) or higher.  MAKE SURE YOU:  · Understand these instructions.  · Will watch your condition.  · Will get help right away if you are not doing well or get worse.  Document Released: 06/05/2009 Document Revised: 03/11/2013 Document Reviewed: 11/19/2010  [employer]® Patient Information ©2014 [employer].

## 2022-04-12 ENCOUNTER — OFFICE VISIT (OUTPATIENT)
Dept: CARDIOLOGY | Facility: MEDICAL CENTER | Age: 62
End: 2022-04-12
Payer: COMMERCIAL

## 2022-04-12 VITALS
HEIGHT: 69 IN | OXYGEN SATURATION: 96 % | HEART RATE: 64 BPM | BODY MASS INDEX: 21.62 KG/M2 | SYSTOLIC BLOOD PRESSURE: 116 MMHG | RESPIRATION RATE: 16 BRPM | DIASTOLIC BLOOD PRESSURE: 64 MMHG | WEIGHT: 146 LBS

## 2022-04-12 DIAGNOSIS — R00.2 PALPITATIONS: ICD-10-CM

## 2022-04-12 DIAGNOSIS — E78.5 HYPERLIPIDEMIA, UNSPECIFIED HYPERLIPIDEMIA TYPE: ICD-10-CM

## 2022-04-12 PROCEDURE — 99204 OFFICE O/P NEW MOD 45 MIN: CPT | Performed by: INTERNAL MEDICINE

## 2022-04-12 RX ORDER — ATORVASTATIN CALCIUM 20 MG/1
20 TABLET, FILM COATED ORAL DAILY
Qty: 90 TABLET | Refills: 3 | Status: SHIPPED | OUTPATIENT
Start: 2022-04-12 | End: 2022-11-22

## 2022-04-12 ASSESSMENT — FIBROSIS 4 INDEX: FIB4 SCORE: 0.95

## 2022-04-12 NOTE — PROGRESS NOTES
"    Cardiology Initial Consultation Note    Date of note:    4/12/2022      Patient Name: Sita Medina   YOB: 1960  MRN:              [id number]    Chief Complaint: Palpitations    Sita Medina is a 62 y.o. female  patient presented today with palpitations.  Her palpitations worse with stress, feels like a skipping beat.  She feels more when her heart rate is slower.  Denies chest pain, shortness of breath.      Past Medical History:   Diagnosis Date   • ASTHMA    • COPD (chronic obstructive pulmonary disease) ([facility]) 10/4/2010   • COPD (chronic obstructive pulmonary disease) ([facility]) 10/4/2010   • Eczema 10/4/2010   • Onychomycosis of toenail 4/14/2021   • Osteopenia 10/4/2010   • Recurrent acute sinusitis              Current Outpatient Medications   Medication Sig Dispense Refill   • atorvastatin (LIPITOR) 20 MG Tab Take 1 Tablet by mouth every day. 90 Tablet 3   • Fluticasone Propionate (FLONASE NA) Administer  into affected nostril(S).     • doxycycline (VIBRAMYCIN) 100 MG Tab Take 1 Tablet by mouth 2 times a day for 7 days. 14 Tablet 0   • pantoprazole (PROTONIX) 40 MG Tablet Delayed Response TAKE 1 TABLET BY MOUTH EVERY DAY 90 Tablet 0   • Calcium-Vitamin D-Vitamin K (VIACTIV CALCIUM PLUS D) 650-12.5-40 MG-MCG-MCG Chew Tab Chew.     • albuterol 108 (90 Base) MCG/ACT Aero Soln inhalation aerosol      • Apoaequorin (PREVAGEN PO) Take  by mouth.     • SYMBICORT 160-4.5 MCG/ACT Aerosol 1 Puff 2 times a day.     • alendronate (FOSAMAX) 70 MG Tab TAKE 1 TABLET BY MOUTH ONE TIME PER WEEK (Patient not taking: Reported on 4/12/2022) 12 Tablet 3     No current facility-administered medications for this visit.             Physical Exam:  Ambulatory Vitals  /64 (BP Location: Left arm, Patient Position: Sitting, BP Cuff Size: Adult)   Pulse 64   Resp 16   Ht 1.753 m (5' 9\")   Wt 66.2 kg (146 lb)   SpO2 96%    Oxygen Therapy:  Pulse Oximetry: 96 %  BP Readings from Last 4 " Encounters:   22 116/64   22 (!) 98/58   22 (!) 86/56   22 104/58       Weight/BMI: Body mass index is 21.56 kg/m².  Wt Readings from Last 4 Encounters:   22 66.2 kg (146 lb)   22 66.9 kg (147 lb 6.4 oz)   22 67.9 kg (149 lb 11.1 oz)   22 65.8 kg (145 lb)           General: Well appearing and in no apparent distress  Neck: carotid bruits absent  Lungs: respiratory sounds  normal  Heart: Regular rhythm,  No palpable thrills on palpation, murmurs absent, no rubs,   Lower extremity edema absent.     EKG today shows sinus rhythm    Dr. Gore notes reviewed from 3/1/2022      Medical Decision Makin-year-old female patient with palpitations.  14-day bilateral heart monitor results reviewed, personally interpreted shows no sustained arrhythmias, rare PACs, PVCs.  Reassurance is provided.  No specific treatment is needed.  Counseled on hydration, avoiding stimulants like caffeine.  Advised to get in touch with me again if she has sustained palpitations.  Her ASCVD risk score is around 8.5%, recommend moderate intensity statin to reduce LDL close to 70.  We will start with Lipitor 20 mg daily, reevaluate with repeat lipid panel in 3 to 4 months.  Follow-up with me as needed.      This note was dictated using Dragon speech recognition software.    Mandeep MEDINA  Interventional cardiologist  [facility], [facility]  [address] [address]  Phone: [phone]  Fax: [phone]

## 2022-05-09 RX ORDER — PANTOPRAZOLE SODIUM 40 MG/1
TABLET, DELAYED RELEASE ORAL
Qty: 90 TABLET | Refills: 0 | Status: SHIPPED | OUTPATIENT
Start: 2022-05-09 | End: 2022-08-08 | Stop reason: SDUPTHER

## 2022-05-20 ENCOUNTER — HOSPITAL ENCOUNTER (OUTPATIENT)
Dept: RADIOLOGY | Facility: MEDICAL CENTER | Age: 62
End: 2022-05-20
Attending: STUDENT IN AN ORGANIZED HEALTH CARE EDUCATION/TRAINING PROGRAM
Payer: COMMERCIAL

## 2022-05-20 DIAGNOSIS — R59.9 SWELLING OF LYMPH NODES: ICD-10-CM

## 2022-05-20 PROCEDURE — 76536 US EXAM OF HEAD AND NECK: CPT

## 2022-06-01 ENCOUNTER — HOSPITAL ENCOUNTER (OUTPATIENT)
Dept: RADIOLOGY | Facility: MEDICAL CENTER | Age: 62
End: 2022-06-01
Attending: STUDENT IN AN ORGANIZED HEALTH CARE EDUCATION/TRAINING PROGRAM
Payer: COMMERCIAL

## 2022-06-01 DIAGNOSIS — D44.0 TERATOMA OF UNCERTAIN BEHAVIOR OF THYROID GLAND: ICD-10-CM

## 2022-06-01 RX ORDER — LIDOCAINE HYDROCHLORIDE 10 MG/ML
INJECTION, SOLUTION INFILTRATION; PERINEURAL
Status: DISCONTINUED
Start: 2022-06-01 | End: 2022-06-01

## 2022-06-01 NOTE — PROGRESS NOTES
Dr Abdi at bedside. Ultrasound used to visualize area for biopsy. Dr Abdi unable to visualize any area for biopsy and decision was made to not go forward with procedure.

## 2022-08-05 ENCOUNTER — APPOINTMENT (OUTPATIENT)
Dept: RADIOLOGY | Facility: MEDICAL CENTER | Age: 62
End: 2022-08-05
Attending: STUDENT IN AN ORGANIZED HEALTH CARE EDUCATION/TRAINING PROGRAM
Payer: COMMERCIAL

## 2022-08-05 DIAGNOSIS — M26.623 BILATERAL TEMPOROMANDIBULAR JOINT PAIN: ICD-10-CM

## 2022-08-05 PROCEDURE — 70336 MAGNETIC IMAGE JAW JOINT: CPT

## 2022-08-09 RX ORDER — PANTOPRAZOLE SODIUM 40 MG/1
40 TABLET, DELAYED RELEASE ORAL
Qty: 90 TABLET | Refills: 0 | Status: SHIPPED | OUTPATIENT
Start: 2022-08-09 | End: 2022-11-07

## 2022-10-12 ENCOUNTER — PATIENT MESSAGE (OUTPATIENT)
Dept: HEALTH INFORMATION MANAGEMENT | Facility: OTHER | Age: 62
End: 2022-10-12

## 2022-11-07 RX ORDER — PANTOPRAZOLE SODIUM 40 MG/1
40 TABLET, DELAYED RELEASE ORAL
Qty: 90 TABLET | Refills: 0 | Status: SHIPPED | OUTPATIENT
Start: 2022-11-07 | End: 2023-02-22

## 2022-11-07 NOTE — TELEPHONE ENCOUNTER
Received request via: Pharmacy    Was the patient seen in the last year in this department? Yes    Does the patient have an active prescription (recently filled or refills available) for medication(s) requested? No  Future Appointments         Provider Department Center    11/10/2022 3:00 PM (Arrive by 2:45 PM) [facility]

## 2022-11-10 ENCOUNTER — HOSPITAL ENCOUNTER (OUTPATIENT)
Dept: RADIOLOGY | Facility: MEDICAL CENTER | Age: 62
End: 2022-11-10
Attending: STUDENT IN AN ORGANIZED HEALTH CARE EDUCATION/TRAINING PROGRAM
Payer: COMMERCIAL

## 2022-11-10 DIAGNOSIS — D44.0 TERATOMA OF UNCERTAIN BEHAVIOR OF THYROID GLAND: ICD-10-CM

## 2022-11-10 PROCEDURE — 76536 US EXAM OF HEAD AND NECK: CPT

## 2022-11-19 SDOH — ECONOMIC STABILITY: HOUSING INSECURITY
IN THE LAST 12 MONTHS, WAS THERE A TIME WHEN YOU DID NOT HAVE A STEADY PLACE TO SLEEP OR SLEPT IN A SHELTER (INCLUDING NOW)?: PATIENT REFUSED

## 2022-11-19 SDOH — ECONOMIC STABILITY: FOOD INSECURITY: WITHIN THE PAST 12 MONTHS, YOU WORRIED THAT YOUR FOOD WOULD RUN OUT BEFORE YOU GOT MONEY TO BUY MORE.: NEVER TRUE

## 2022-11-19 SDOH — ECONOMIC STABILITY: INCOME INSECURITY: HOW HARD IS IT FOR YOU TO PAY FOR THE VERY BASICS LIKE FOOD, HOUSING, MEDICAL CARE, AND HEATING?: NOT HARD AT ALL

## 2022-11-19 SDOH — HEALTH STABILITY: MENTAL HEALTH
STRESS IS WHEN SOMEONE FEELS TENSE, NERVOUS, ANXIOUS, OR CAN'T SLEEP AT NIGHT BECAUSE THEIR MIND IS TROUBLED. HOW STRESSED ARE YOU?: NOT AT ALL

## 2022-11-19 SDOH — ECONOMIC STABILITY: HOUSING INSECURITY

## 2022-11-19 SDOH — ECONOMIC STABILITY: INCOME INSECURITY: IN THE LAST 12 MONTHS, WAS THERE A TIME WHEN YOU WERE NOT ABLE TO PAY THE MORTGAGE OR RENT ON TIME?: PATIENT REFUSED

## 2022-11-19 SDOH — ECONOMIC STABILITY: FOOD INSECURITY: WITHIN THE PAST 12 MONTHS, THE FOOD YOU BOUGHT JUST DIDN'T LAST AND YOU DIDN'T HAVE MONEY TO GET MORE.: NEVER TRUE

## 2022-11-19 SDOH — ECONOMIC STABILITY: TRANSPORTATION INSECURITY
IN THE PAST 12 MONTHS, HAS LACK OF RELIABLE TRANSPORTATION KEPT YOU FROM MEDICAL APPOINTMENTS, MEETINGS, WORK OR FROM GETTING THINGS NEEDED FOR DAILY LIVING?: NO

## 2022-11-19 SDOH — HEALTH STABILITY: PHYSICAL HEALTH: ON AVERAGE, HOW MANY DAYS PER WEEK DO YOU ENGAGE IN MODERATE TO STRENUOUS EXERCISE (LIKE A BRISK WALK)?: 3 DAYS

## 2022-11-19 SDOH — HEALTH STABILITY: PHYSICAL HEALTH: ON AVERAGE, HOW MANY MINUTES DO YOU ENGAGE IN EXERCISE AT THIS LEVEL?: 60 MIN

## 2022-11-19 SDOH — ECONOMIC STABILITY: TRANSPORTATION INSECURITY
IN THE PAST 12 MONTHS, HAS THE LACK OF TRANSPORTATION KEPT YOU FROM MEDICAL APPOINTMENTS OR FROM GETTING MEDICATIONS?: NO

## 2022-11-19 SDOH — ECONOMIC STABILITY: TRANSPORTATION INSECURITY
IN THE PAST 12 MONTHS, HAS LACK OF TRANSPORTATION KEPT YOU FROM MEETINGS, WORK, OR FROM GETTING THINGS NEEDED FOR DAILY LIVING?: NO

## 2022-11-19 ASSESSMENT — SOCIAL DETERMINANTS OF HEALTH (SDOH)
HOW OFTEN DO YOU ATTENT MEETINGS OF THE CLUB OR ORGANIZATION YOU BELONG TO?: MORE THAN 4 TIMES PER YEAR
WITHIN THE PAST 12 MONTHS, YOU WORRIED THAT YOUR FOOD WOULD RUN OUT BEFORE YOU GOT THE MONEY TO BUY MORE: NEVER TRUE
HOW OFTEN DO YOU GET TOGETHER WITH FRIENDS OR RELATIVES?: THREE TIMES A WEEK
IN A TYPICAL WEEK, HOW MANY TIMES DO YOU TALK ON THE PHONE WITH FAMILY, FRIENDS, OR NEIGHBORS?: MORE THAN THREE TIMES A WEEK
DO YOU BELONG TO ANY CLUBS OR ORGANIZATIONS SUCH AS CHURCH GROUPS UNIONS, FRATERNAL OR ATHLETIC GROUPS, OR SCHOOL GROUPS?: YES
HOW OFTEN DO YOU HAVE SIX OR MORE DRINKS ON ONE OCCASION: NEVER
DO YOU BELONG TO ANY CLUBS OR ORGANIZATIONS SUCH AS CHURCH GROUPS UNIONS, FRATERNAL OR ATHLETIC GROUPS, OR SCHOOL GROUPS?: YES
HOW HARD IS IT FOR YOU TO PAY FOR THE VERY BASICS LIKE FOOD, HOUSING, MEDICAL CARE, AND HEATING?: NOT HARD AT ALL
HOW OFTEN DO YOU ATTEND CHURCH OR RELIGIOUS SERVICES?: NEVER
HOW OFTEN DO YOU GET TOGETHER WITH FRIENDS OR RELATIVES?: THREE TIMES A WEEK
HOW MANY DRINKS CONTAINING ALCOHOL DO YOU HAVE ON A TYPICAL DAY WHEN YOU ARE DRINKING: PATIENT DOES NOT DRINK
HOW OFTEN DO YOU HAVE A DRINK CONTAINING ALCOHOL: NEVER
HOW OFTEN DO YOU ATTENT MEETINGS OF THE CLUB OR ORGANIZATION YOU BELONG TO?: MORE THAN 4 TIMES PER YEAR
HOW OFTEN DO YOU ATTEND CHURCH OR RELIGIOUS SERVICES?: NEVER
IN A TYPICAL WEEK, HOW MANY TIMES DO YOU TALK ON THE PHONE WITH FAMILY, FRIENDS, OR NEIGHBORS?: MORE THAN THREE TIMES A WEEK

## 2022-11-19 ASSESSMENT — LIFESTYLE VARIABLES
AUDIT-C TOTAL SCORE: 0
HOW MANY STANDARD DRINKS CONTAINING ALCOHOL DO YOU HAVE ON A TYPICAL DAY: PATIENT DOES NOT DRINK
HOW OFTEN DO YOU HAVE A DRINK CONTAINING ALCOHOL: NEVER
HOW OFTEN DO YOU HAVE SIX OR MORE DRINKS ON ONE OCCASION: NEVER
SKIP TO QUESTIONS 9-10: 1

## 2022-11-22 ENCOUNTER — OFFICE VISIT (OUTPATIENT)
Dept: MEDICAL GROUP | Facility: MEDICAL CENTER | Age: 62
End: 2022-11-22
Payer: COMMERCIAL

## 2022-11-22 VITALS
BODY MASS INDEX: 22.36 KG/M2 | OXYGEN SATURATION: 93 % | DIASTOLIC BLOOD PRESSURE: 64 MMHG | HEART RATE: 64 BPM | HEIGHT: 69 IN | TEMPERATURE: 97.1 F | WEIGHT: 151 LBS | SYSTOLIC BLOOD PRESSURE: 96 MMHG

## 2022-11-22 DIAGNOSIS — M81.0 AGE-RELATED OSTEOPOROSIS WITHOUT CURRENT PATHOLOGICAL FRACTURE: ICD-10-CM

## 2022-11-22 DIAGNOSIS — M25.572 CHRONIC PAIN OF LEFT ANKLE: ICD-10-CM

## 2022-11-22 DIAGNOSIS — G89.29 CHRONIC PAIN OF LEFT ANKLE: ICD-10-CM

## 2022-11-22 DIAGNOSIS — E78.5 DYSLIPIDEMIA: Primary | ICD-10-CM

## 2022-11-22 DIAGNOSIS — J44.1 CHRONIC OBSTRUCTIVE PULMONARY DISEASE WITH ACUTE EXACERBATION (HCC): ICD-10-CM

## 2022-11-22 DIAGNOSIS — K76.89 LIVER CYST: ICD-10-CM

## 2022-11-22 DIAGNOSIS — R00.2 PALPITATIONS: ICD-10-CM

## 2022-11-22 PROBLEM — R05.9 COUGH: Status: RESOLVED | Noted: 2022-01-05 | Resolved: 2022-11-22

## 2022-11-22 PROCEDURE — 99214 OFFICE O/P EST MOD 30 MIN: CPT | Performed by: BEHAVIOR ANALYST

## 2022-11-22 RX ORDER — BUDESONIDE AND FORMOTEROL FUMARATE DIHYDRATE 160; 4.5 UG/1; UG/1
1 AEROSOL RESPIRATORY (INHALATION)
COMMUNITY
Start: 2022-11-03 | End: 2023-02-01

## 2022-11-22 ASSESSMENT — FIBROSIS 4 INDEX: FIB4 SCORE: 0.95

## 2022-11-23 NOTE — PROGRESS NOTES
Subjective:     CC:  Diagnoses of Dyslipidemia, Chronic obstructive pulmonary disease with acute exacerbation (HCC), Age-related osteoporosis without current pathological fracture, Liver cyst, Palpitations, and Chronic pain of left ankle were pertinent to this visit.    HISTORY OF THE PRESENT ILLNESS: Patient is a 62 y.o. female. This pleasant patient is here today to establish care and discuss right ankle pain and chronic conditions. His/her prior PCP was Dr. Gore.    Problem   Chronic Pain of Left Ankle    About 2 years ago she fell off a brick wall on to her feet.   Had an xray of the left ankle without evidence of fracture but now also having pain in the anterior right lower leg and ankle to palpation.  She also notices occasional swelling after being on her feet and bruising on occasion.  She is requesting x-rays.       Dyslipidemia    She was prescribed atorvastatin by cardiology but has not started. She wanted to wait and see if lifestyle modifications first.   LDL:  140 April 2020     Palpitations    Continues to feel palpitations at night when she is lying still in bed.  They have not worsened.  She saw cardiology for this and evaluation was unremarkable.  She was advised to drink more water.   Does drink quite a bit of caffeine.   Exercise classes twice a week with no palpitations.          Liver Cyst    Sees GI but both Dr. Lamas and Dr. Pillai can no longer see her.  Says she takes the pantoprazole for this.   States she was advised to follow up in one year with GI but its been about two years.      Copd (Chronic Obstructive Pulmonary Disease) (Hcc)    Continues to use Symbicort daily. Never needs to use the albuterol.   Sees Dr. Crowley with pulmonary. Getting CT lung low dose next week per Dr. Crowley.   Reports hx of concurrent asthma. .   Stopped smoking in 2019. Congratulated her on this.        Age-Related Osteoporosis Without Current Pathological Fracture    Continues to take fosamax once  "weekly. No side effects with biophosphonate.    Started 4/2021 based on last dexa.   FINDINGS: lumbar spine T score of -4.1; femur T score of -3.3    Continues to take vitamin D and calcium supplement.        Cough (Resolved)       Current Outpatient Medications Ordered in Epic   Medication Sig Dispense Refill    budesonide-formoterol (SYMBICORT) 160-4.5 MCG/ACT Aerosol Inhale 1 Puff.      pantoprazole (PROTONIX) 40 MG Tablet Delayed Response TAKE 1 TABLET BY MOUTH EVERY DAY 90 Tablet 0    Fluticasone Propionate (FLONASE NA) Administer  into affected nostril(S).      alendronate (FOSAMAX) 70 MG Tab TAKE 1 TABLET BY MOUTH ONE TIME PER WEEK 12 Tablet 3    Calcium-Vitamin D-Vitamin K (VIACTIV CALCIUM PLUS D) 650-12.5-40 MG-MCG-MCG Chew Tab Chew.      albuterol 108 (90 Base) MCG/ACT Aero Soln inhalation aerosol       Apoaequorin (PREVAGEN PO) Take  by mouth.      SYMBICORT 160-4.5 MCG/ACT Aerosol 1 Puff 2 times a day.       No current [facility]-ordered facility-administered medications on file.       Health Maintenance: deferred to next visit.        Objective:       Exam: BP (!) 96/64 (BP Location: Right arm, Patient Position: Sitting, BP Cuff Size: Adult long)   Pulse 64   Temp 36.2 °C (97.1 °F) (Temporal)   Ht 1.753 m (5' 9\")   Wt 68.5 kg (151 lb)   SpO2 93%  Body mass index is 22.3 kg/m².    Physical Exam  Constitutional:       Appearance: Normal appearance.   HENT:      Right Ear: Tympanic membrane and ear canal normal.      Left Ear: Tympanic membrane and ear canal normal.   Cardiovascular:      Rate and Rhythm: Normal rate and regular rhythm.      Pulses: Normal pulses.      Heart sounds: Normal heart sounds.   Pulmonary:      Effort: Pulmonary effort is normal.      Breath sounds: Normal breath sounds.   Musculoskeletal:      Cervical back: Normal range of motion and neck supple.      Right ankle: Swelling present.      Right Achilles Tendon: Tenderness present.        Legs:       Comments: Tenderness to " palpation of the medial lower right leg just above the medial malleolus.  No point tenderness to the medial malleolus.    Neurological:      Mental Status: She is alert.       Assessment & Plan:   62 y.o. female with the following -    1. Dyslipidemia  - Chronic condition uncontrolled.  -Patient has decided not to start the atorvastatin prescribed by cardiology  - Reviewed patient's ASCVD risk with and without statin therapy.  The 10-year ASCVD risk score (Isabell YANCEY, et al., 2019) is: 3.1% with statin therapy.   -She is working on lifestyle modifications and would like to repeat a cholesterol panel.   - Lipid Profile; Future    2. Chronic obstructive pulmonary disease with acute exacerbation (HCC)  -Chronic stable condition.  -Continue close monitoring with pulmonology.     3. Age-related osteoporosis without current pathological fracture  -Chronic condition she started oral biphosphonate 4/2021 and is tolerating well.  We will plan to repeat DEXA scan in 1 to 2 years.  -Discussed normal course of treatment and follow-up imaging.     4. Liver cyst  - Referral to Gastroenterology    5. Palpitations  Chronic stable condition.  Continues to have palpitations without worrisome symptoms.   -Discussed lifestyle modifications to decrease palpitations including reducing caffeine intake.     6. Chronic pain of left ankle  -Patient reports traumatic injury where heavy bricks fell on both ankles.  Pain of the left lower leg and ankle have resolved however pain and swelling of the right ankle persist.    - Offered to refer her to sports medicine for more thorough evaluation of chronic pain posttraumatic event however patient prefers to begin with x-rays of the right ankle before seeing a specialist.   - DX-ANKLE 3+ VIEWS RIGHT; Future  - DX-TIBIA AND FIBULA RIGHT; Future      Return in about 3 months (around 2/22/2023) for Lab Results, imaging, med check.    Please note that this dictation was created using voice recognition  software. I have made every reasonable attempt to correct obvious errors, but I expect that there are errors of grammar and possibly content that I did not discover before finalizing the note.

## 2022-12-06 ENCOUNTER — APPOINTMENT (OUTPATIENT)
Dept: RADIOLOGY | Facility: MEDICAL CENTER | Age: 62
End: 2022-12-06
Attending: BEHAVIOR ANALYST
Payer: COMMERCIAL

## 2022-12-06 DIAGNOSIS — M25.572 CHRONIC PAIN OF LEFT ANKLE: ICD-10-CM

## 2022-12-06 DIAGNOSIS — G89.29 CHRONIC PAIN OF LEFT ANKLE: ICD-10-CM

## 2022-12-06 PROCEDURE — 73590 X-RAY EXAM OF LOWER LEG: CPT | Mod: RT

## 2022-12-16 ENCOUNTER — PATIENT MESSAGE (OUTPATIENT)
Dept: MEDICAL GROUP | Facility: MEDICAL CENTER | Age: 62
End: 2022-12-16
Payer: COMMERCIAL

## 2022-12-16 DIAGNOSIS — Z12.31 ENCOUNTER FOR SCREENING MAMMOGRAM FOR MALIGNANT NEOPLASM OF BREAST: ICD-10-CM

## 2022-12-25 ENCOUNTER — APPOINTMENT (OUTPATIENT)
Dept: RADIOLOGY | Facility: MEDICAL CENTER | Age: 62
End: 2022-12-25
Attending: EMERGENCY MEDICINE
Payer: COMMERCIAL

## 2022-12-25 ENCOUNTER — HOSPITAL ENCOUNTER (EMERGENCY)
Facility: MEDICAL CENTER | Age: 62
End: 2022-12-26
Attending: EMERGENCY MEDICINE
Payer: COMMERCIAL

## 2022-12-25 DIAGNOSIS — U07.1 COVID-19 VIRUS INFECTION: ICD-10-CM

## 2022-12-25 DIAGNOSIS — J45.901 MILD ASTHMA WITH ACUTE EXACERBATION, UNSPECIFIED WHETHER PERSISTENT: ICD-10-CM

## 2022-12-25 LAB
ALBUMIN SERPL BCP-MCNC: 4.5 G/DL (ref 3.2–4.9)
ALBUMIN/GLOB SERPL: 1.7 G/DL
ALP SERPL-CCNC: 81 U/L (ref 30–99)
ALT SERPL-CCNC: 12 U/L (ref 2–50)
ANION GAP SERPL CALC-SCNC: 10 MMOL/L (ref 7–16)
AST SERPL-CCNC: 16 U/L (ref 12–45)
BILIRUB SERPL-MCNC: 0.2 MG/DL (ref 0.1–1.5)
BUN SERPL-MCNC: 16 MG/DL (ref 8–22)
CALCIUM ALBUM COR SERPL-MCNC: 9.3 MG/DL (ref 8.5–10.5)
CALCIUM SERPL-MCNC: 9.7 MG/DL (ref 8.4–10.2)
CHLORIDE SERPL-SCNC: 105 MMOL/L (ref 96–112)
CO2 SERPL-SCNC: 24 MMOL/L (ref 20–33)
CREAT SERPL-MCNC: 0.65 MG/DL (ref 0.5–1.4)
D DIMER PPP IA.FEU-MCNC: 0.68 UG/ML (FEU) (ref 0–0.5)
GFR SERPLBLD CREATININE-BSD FMLA CKD-EPI: 98 ML/MIN/1.73 M 2
GLOBULIN SER CALC-MCNC: 2.6 G/DL (ref 1.9–3.5)
GLUCOSE SERPL-MCNC: 159 MG/DL (ref 65–99)
LIPASE SERPL-CCNC: 19 U/L (ref 7–58)
POTASSIUM SERPL-SCNC: 4.9 MMOL/L (ref 3.6–5.5)
PROT SERPL-MCNC: 7.1 G/DL (ref 6–8.2)
SODIUM SERPL-SCNC: 139 MMOL/L (ref 135–145)

## 2022-12-25 PROCEDURE — 99284 EMERGENCY DEPT VISIT MOD MDM: CPT

## 2022-12-25 PROCEDURE — C9803 HOPD COVID-19 SPEC COLLECT: HCPCS | Performed by: EMERGENCY MEDICINE

## 2022-12-25 PROCEDURE — 94760 N-INVAS EAR/PLS OXIMETRY 1: CPT

## 2022-12-25 PROCEDURE — 80053 COMPREHEN METABOLIC PANEL: CPT

## 2022-12-25 PROCEDURE — 71045 X-RAY EXAM CHEST 1 VIEW: CPT

## 2022-12-25 PROCEDURE — 0241U HCHG SARS-COV-2 COVID-19 NFCT DS RESP RNA 4 TRGT MIC: CPT

## 2022-12-25 PROCEDURE — 700101 HCHG RX REV CODE 250: Performed by: EMERGENCY MEDICINE

## 2022-12-25 PROCEDURE — 83690 ASSAY OF LIPASE: CPT

## 2022-12-25 PROCEDURE — 93005 ELECTROCARDIOGRAM TRACING: CPT | Performed by: EMERGENCY MEDICINE

## 2022-12-25 PROCEDURE — 85379 FIBRIN DEGRADATION QUANT: CPT

## 2022-12-25 PROCEDURE — 84484 ASSAY OF TROPONIN QUANT: CPT

## 2022-12-25 PROCEDURE — 85025 COMPLETE CBC W/AUTO DIFF WBC: CPT

## 2022-12-25 PROCEDURE — 36415 COLL VENOUS BLD VENIPUNCTURE: CPT

## 2022-12-25 PROCEDURE — 94640 AIRWAY INHALATION TREATMENT: CPT

## 2022-12-25 RX ORDER — IPRATROPIUM BROMIDE AND ALBUTEROL SULFATE 2.5; .5 MG/3ML; MG/3ML
3 SOLUTION RESPIRATORY (INHALATION) ONCE
Status: COMPLETED | OUTPATIENT
Start: 2022-12-25 | End: 2022-12-25

## 2022-12-25 RX ADMIN — IPRATROPIUM BROMIDE AND ALBUTEROL SULFATE 3 ML: 2.5; .5 SOLUTION RESPIRATORY (INHALATION) at 23:13

## 2022-12-25 ASSESSMENT — FIBROSIS 4 INDEX: FIB4 SCORE: 0.95

## 2022-12-26 ENCOUNTER — HOSPITAL ENCOUNTER (OUTPATIENT)
Dept: RADIOLOGY | Facility: MEDICAL CENTER | Age: 62
End: 2022-12-26
Attending: EMERGENCY MEDICINE
Payer: COMMERCIAL

## 2022-12-26 VITALS
WEIGHT: 154.1 LBS | HEIGHT: 69 IN | BODY MASS INDEX: 22.82 KG/M2 | TEMPERATURE: 98.1 F | HEART RATE: 81 BPM | RESPIRATION RATE: 14 BRPM | OXYGEN SATURATION: 95 % | SYSTOLIC BLOOD PRESSURE: 122 MMHG | DIASTOLIC BLOOD PRESSURE: 57 MMHG

## 2022-12-26 LAB
BASOPHILS # BLD AUTO: 0.7 % (ref 0–1.8)
BASOPHILS # BLD: 0.06 K/UL (ref 0–0.12)
EKG IMPRESSION: NORMAL
EOSINOPHIL # BLD AUTO: 0.01 K/UL (ref 0–0.51)
EOSINOPHIL NFR BLD: 0.1 % (ref 0–6.9)
ERYTHROCYTE [DISTWIDTH] IN BLOOD BY AUTOMATED COUNT: 43.1 FL (ref 35.9–50)
FLUAV RNA SPEC QL NAA+PROBE: NEGATIVE
FLUBV RNA SPEC QL NAA+PROBE: NEGATIVE
HCT VFR BLD AUTO: 42.6 % (ref 37–47)
HGB BLD-MCNC: 13.9 G/DL (ref 12–16)
IMM GRANULOCYTES # BLD AUTO: 0.14 K/UL (ref 0–0.11)
IMM GRANULOCYTES NFR BLD AUTO: 1.5 % (ref 0–0.9)
LYMPHOCYTES # BLD AUTO: 0.83 K/UL (ref 1–4.8)
LYMPHOCYTES NFR BLD: 9.1 % (ref 22–41)
MCH RBC QN AUTO: 29.4 PG (ref 27–33)
MCHC RBC AUTO-ENTMCNC: 32.6 G/DL (ref 33.6–35)
MCV RBC AUTO: 90.3 FL (ref 81.4–97.8)
MONOCYTES # BLD AUTO: 0.31 K/UL (ref 0–0.85)
MONOCYTES NFR BLD AUTO: 3.4 % (ref 0–13.4)
NEUTROPHILS # BLD AUTO: 7.78 K/UL (ref 2–7.15)
NEUTROPHILS NFR BLD: 85.2 % (ref 44–72)
NRBC # BLD AUTO: 0 K/UL
NRBC BLD-RTO: 0 /100 WBC
PLATELET # BLD AUTO: 233 K/UL (ref 164–446)
PMV BLD AUTO: 10.2 FL (ref 9–12.9)
RBC # BLD AUTO: 4.72 M/UL (ref 4.2–5.4)
RSV RNA SPEC QL NAA+PROBE: NEGATIVE
SARS-COV-2 RNA RESP QL NAA+PROBE: DETECTED
SPECIMEN SOURCE: ABNORMAL
TROPONIN T SERPL-MCNC: <6 NG/L (ref 6–19)
WBC # BLD AUTO: 9.1 K/UL (ref 4.8–10.8)

## 2022-12-26 PROCEDURE — 700117 HCHG RX CONTRAST REV CODE 255: Performed by: EMERGENCY MEDICINE

## 2022-12-26 PROCEDURE — 700111 HCHG RX REV CODE 636 W/ 250 OVERRIDE (IP): Performed by: EMERGENCY MEDICINE

## 2022-12-26 PROCEDURE — 71275 CT ANGIOGRAPHY CHEST: CPT

## 2022-12-26 RX ORDER — METHYLPREDNISOLONE SODIUM SUCCINATE 125 MG/2ML
125 INJECTION, POWDER, LYOPHILIZED, FOR SOLUTION INTRAMUSCULAR; INTRAVENOUS ONCE
Status: DISCONTINUED | OUTPATIENT
Start: 2022-12-26 | End: 2022-12-26

## 2022-12-26 RX ORDER — PREDNISONE 50 MG/1
50 TABLET ORAL DAILY
Status: COMPLETED | OUTPATIENT
Start: 2022-12-26 | End: 2022-12-26

## 2022-12-26 RX ORDER — PREDNISONE 20 MG/1
20 TABLET ORAL
Status: SHIPPED | COMMUNITY
End: 2023-02-27

## 2022-12-26 RX ORDER — NIRMATRELVIR AND RITONAVIR 300-100 MG
1 KIT ORAL
Qty: 20 EACH | Refills: 0 | Status: SHIPPED | OUTPATIENT
Start: 2022-12-26 | End: 2023-02-27

## 2022-12-26 RX ORDER — PREDNISONE 50 MG/1
50 TABLET ORAL DAILY
Qty: 4 TABLET | Refills: 0 | Status: SHIPPED | OUTPATIENT
Start: 2022-12-26 | End: 2022-12-30

## 2022-12-26 RX ADMIN — PREDNISONE 50 MG: 50 TABLET ORAL at 01:00

## 2022-12-26 RX ADMIN — IOHEXOL 51 ML: 350 INJECTION, SOLUTION INTRAVENOUS at 02:00

## 2022-12-26 NOTE — ED PROVIDER NOTES
ED Provider Note    CHIEF COMPLAINT  Chief Complaint   Patient presents with    Asthma     Sob  pain to R back side that started tonight  has Hx of asthma   has not had any issues until recently  is using inhalers  they work for just a few hours then flairs up again        LIMITATION TO HISTORY   Select: : None    HPI  Sita Medina is a 62 y.o. female who presents to the emergency department for evaluation of shortness of breath.  Patient states that over the last 3 days she has had difficulty breathing.  She does admit to a cough as well.  She states that this feels like her typical asthma flare.  She has been using her albuterol at home with little alleviation.  She did also take 20 mg of prednisone today but is still having symptoms.  She states that she also had some back pain that occurred 3 times today but it has since resolved.  She denies any fevers.  She states that her  has been sick with a virus over the last week.  She does admit to nasal congestion but has not had any sore throat or persistent runny nose.  She denies any chest pain.  She denies any nausea, vomiting, abdominal pain, diarrhea, or changes in urination.    The patient does not smoke.  She has no history of diabetes, hypertension, hyperlipidemia, or coronary artery disease.  She denies any family history of coronary artery disease.  She has no previous history of blood clots or active malignancy.  She denies hemoptysis.  She denies calf tenderness or swelling.  She has not had any recent travel, hospitalizations, or surgeries.    OUTSIDE HISTORIAN(S):  Select: None    EXTERNAL RECORDS REVIEWED  Select: Outpatient Notes Recent office visit    REVIEW OF SYSTEMS  See HPI for further details. All other systems are negative.     PAST MEDICAL HISTORY   has a past medical history of ASTHMA, COPD (chronic obstructive pulmonary disease) ([facility]) (10/4/2010), COPD (chronic obstructive pulmonary disease) ([facility]) (10/4/2010), Eczema  "(10/4/2010), Onychomycosis of toenail (4/14/2021), Osteopenia (10/4/2010), and Recurrent acute sinusitis.    SURGICAL HISTORY  patient denies any surgical history    FAMILY HISTORY  Family History   Problem Relation Age of Onset    Cancer Mother         breast    Allergies Mother     Diabetes Father     Cancer Brother         esophageal cancer       SOCIAL HISTORY  Social History     Tobacco Use    Smoking status: Former     Packs/day: 0.00     Years: 15.00     Pack years: 0.00     Types: Cigarettes     Quit date: 2/13/2019     Years since quitting: 3.8    Smokeless tobacco: Never   Vaping Use    Vaping Use: Never used   Substance and Sexual Activity    Alcohol use: Never    Drug use: No    Sexual activity: Yes     Partners: Male       CURRENT MEDICATIONS  Home Medications       Reviewed by Ritu Courtney R.N. (Registered Nurse) on 12/25/22 at [email or web address]  Med List Status: Not Addressed     Medication Last Dose Status   albuterol 108 (90 Base) MCG/ACT Aero Soln inhalation aerosol  Active   alendronate (FOSAMAX) 70 MG Tab  Active   Apoaequorin (PREVAGEN PO)  Active   budesonide-formoterol (SYMBICORT) 160-4.5 MCG/ACT Aerosol  Active   Calcium-Vitamin D-Vitamin K (VIACTIV CALCIUM PLUS D) 650-12.5-40 MG-MCG-MCG Chew Tab  Active   Fluticasone Propionate (FLONASE NA)  Active   pantoprazole (PROTONIX) 40 MG Tablet Delayed Response  Active   SYMBICORT 160-4.5 MCG/ACT Aerosol  Active                    ALLERGIES  Allergies   Allergen Reactions    Cefzil [Cefprozil]      nausea       PHYSICAL EXAM  VITAL SIGNS: /62   Pulse 80   Temp 36.7 °C (98.1 °F) (Temporal)   Resp 17   Ht 1.753 m (5' 9\")   Wt 69.9 kg (154 lb 1.6 oz)   LMP 04/02/2008   SpO2 91%   BMI 22.76 kg/m²   Constitutional: Alert and in no apparent distress.  HENT: Normocephalic atraumatic. Bilateral external ears normal. Bilateral TM's clear. Nose normal. Mucous membranes are moist.  Eyes: Pupils are equal and reactive. Conjunctiva normal. Non-icteric " sclera.   Neck: Normal range of motion without tenderness. Supple. No meningeal signs.  Cardiovascular: Regular rate and rhythm. No murmurs, gallops or rubs.  Thorax & Lungs: No retractions, nasal flaring, or tachypnea. Breath sounds are clear to auscultation bilaterally. No wheezing, rhonchi or rales.  Abdomen: Soft, nontender and nondistended. No hepatosplenomegaly.  Skin: Warm and dry. No rashes are noted.  Back: No bony tenderness, No CVA tenderness.   Extremities: 2+ peripheral pulses. Cap refill is less than 2 seconds. No edema, cyanosis, or clubbing.  Musculoskeletal: Good range of motion in all major joints. No tenderness to palpation or major deformities noted.   Neurologic: Alert and appropriate for age. The patient moves all 4 extremities without obvious deficits.    DIAGNOSTIC STUDIES / PROCEDURES    LABS  Results for orders placed or performed during the hospital encounter of 12/25/22   CBC WITH DIFFERENTIAL   Result Value Ref Range    WBC 9.1 4.8 - 10.8 K/uL    RBC 4.72 4.20 - 5.40 M/uL    Hemoglobin 13.9 12.0 - 16.0 g/dL    Hematocrit 42.6 37.0 - 47.0 %    MCV 90.3 81.4 - 97.8 fL    MCH 29.4 27.0 - 33.0 pg    MCHC 32.6 (L) 33.6 - 35.0 g/dL    RDW 43.1 35.9 - 50.0 fL    Platelet Count 233 164 - 446 K/uL    MPV 10.2 9.0 - 12.9 fL    Neutrophils-Polys 85.20 (H) 44.00 - 72.00 %    Lymphocytes 9.10 (L) 22.00 - 41.00 %    Monocytes 3.40 0.00 - 13.40 %    Eosinophils 0.10 0.00 - 6.90 %    Basophils 0.70 0.00 - 1.80 %    Immature Granulocytes 1.50 (H) 0.00 - 0.90 %    Nucleated RBC 0.00 /100 WBC    Neutrophils (Absolute) 7.78 (H) 2.00 - 7.15 K/uL    Lymphs (Absolute) 0.83 (L) 1.00 - 4.80 K/uL    Monos (Absolute) 0.31 0.00 - 0.85 K/uL    Eos (Absolute) 0.01 0.00 - 0.51 K/uL    Baso (Absolute) 0.06 0.00 - 0.12 K/uL    Immature Granulocytes (abs) 0.14 (H) 0.00 - 0.11 K/uL    NRBC (Absolute) 0.00 K/uL   COMP METABOLIC PANEL   Result Value Ref Range    Sodium 139 135 - 145 mmol/L    Potassium 4.9 3.6 - 5.5 mmol/L     Chloride 105 96 - 112 mmol/L    Co2 24 20 - 33 mmol/L    Anion Gap 10.0 7.0 - 16.0    Glucose 159 (H) 65 - 99 mg/dL    Bun 16 8 - 22 mg/dL    Creatinine 0.65 0.50 - 1.40 mg/dL    Calcium 9.7 8.4 - 10.2 mg/dL    AST(SGOT) 16 12 - 45 U/L    ALT(SGPT) 12 2 - 50 U/L    Alkaline Phosphatase 81 30 - 99 U/L    Total Bilirubin 0.2 0.1 - 1.5 mg/dL    Albumin 4.5 3.2 - 4.9 g/dL    Total Protein 7.1 6.0 - 8.2 g/dL    Globulin 2.6 1.9 - 3.5 g/dL    A-G Ratio 1.7 g/dL   LIPASE   Result Value Ref Range    Lipase 19 7 - 58 U/L   TROPONIN   Result Value Ref Range    Troponin T <6 6 - 19 ng/L   D-DIMER   Result Value Ref Range    D-Dimer Screen 0.68 (H) 0.00 - 0.50 ug/mL (FEU)   COV-2, FLU A/B, AND RSV BY PCR (2-4 HOURS CEPHEID): Collect NP swab in VTM    Specimen: Respirate   Result Value Ref Range    SARS-CoV-2 Source NP Swab    CORRECTED CALCIUM   Result Value Ref Range    Correct Calcium 9.3 8.5 - 10.5 mg/dL   ESTIMATED GFR   Result Value Ref Range    GFR (CKD-EPI) 98 >60 mL/min/1.73 m 2   EKG (NOW)   Result Value Ref Range    Report       [facility] Emergency Dept[facility]    Test Date:  2022  Pt Name:    BARTOLO LAGUNAS                 Department[facility]  MRN:        [id number]                      [facility]   Gender:     Female                       Technician: [id number]  :        [id number]                   Requested By:MARGRET JALLOH  Order #:    [id number]                    Reading MD: Margret Jalloh    Measurements  Intervals                                Axis  Rate:       72                           P:          63  DE:         148                          QRS:        22  QRSD:       90                           T:          20  QT:         404  QTc:        443    Interpretive Statements  SINUS RHYTHM  No ST elevation or depression noted. No arrhythmias noted. Intervals are  within  normal limits.  Compared to ECG 2021 00:36:05  No significant changes  Electronically Signed On 2022  0:26:43 PST by Margret Trejo       RADIOLOGY  I have independently interpreted the diagnostic imaging associated with this visit and am waiting the final reading from the radiologist. Select: X-ray(s) Chest xray  DX-CHEST-PORTABLE (1 VIEW)   Final Result      No acute cardiopulmonary abnormality identified.      CT-CTA CHEST PULMONARY ARTERY W/ RECONS    (Results Pending)     COURSE & MEDICAL DECISION MAKING  Pertinent Labs & Imaging studies reviewed. (See chart for details)      This is a 62-year-old female presenting to the emergency department for evaluation of shortness of breath.  On initial evaluation, the patient appeared well and in no acute distress.  Her vital signs were normal, specifically her pulse ox was reassuring at 96%.  Her lung sounds were actually quite clear with no obvious wheezing bilaterally.  However, the patient admitted to feeling short of breath.  A DuoNeb was trialed.    Additionally, work-up was initiated including an EKG and chest x-ray.  No evidence of acute ischemia or arrhythmia was noted.  Troponin was normal.  Her heart score is 1, and I have extremely low risk for ACS at this point.    D-dimer was positive and CT of the chest was ordered.    H&H were normal and I am less concerned for symptomatic anemia.  Count was normal and the patient denied any fevers.  I am less concerned for serious bacterial illness.    Viral panel was ordered and positive for Covid 19. I discussed the case with pharmacy who recommended Paxlovid given her asthma    The patient was given a duoneb and upon re-evaluation, the patient stated that she felt improved. Better air movement was noted upon auscultation of her lungs. The patient refused solumedrol, so she was given a full prednisone dose.     The patient was signed out to Dr Orozco pending results of the CT but will likely be able to be discharged if negative.     FINAL IMPRESSION  1. Mild asthma with acute exacerbation, unspecified whether persistent         PRESCRIPTIONS  New Prescriptions    PREDNISONE (DELTASONE) 50 MG TAB    Take 1 Tablet by mouth every day for 4 days.     FOLLOW UP  Merle Gore M.D.  [address]  [address]  [facility] [address] [phone]    Call in 1 day  To schedule a follow up appointment    [facility], Emergency Dept  [address]  [facility] [id number] [phone]  Go to   As needed    Electronically signed by: Margret Trejo D.O., 12/25/2022 11:00 PM

## 2022-12-26 NOTE — ED PROVIDER NOTES
ED Provider Note      Patient's care was transferred to me at shift change pending CTA chest to rule out pulmonary embolus.  It was found to be negative for any PE.  Patient's vital signs have remained stable, she is feeling much better after her nebulized breathing treatment.  I spoke with pharmacy and they did recommend Paxlovid to be administered safely since the patient is positive for COVID and has a history of asthma.  Prescription has been filled out for the patient and was given to her to take to her local pharmacy.  She is being discharged in stable and improved condition.    Discharge diagnosis : 1-mild asthma exacerbation                      2-COVID-positive    CT-CTA CHEST PULMONARY ARTERY W/ RECONS   Final Result      1.  Negative for pulmonary embolism or other acute abnormality      2.  Large simple cyst within the left lobe of the liver      3.  Mild aortic atherosclerotic plaque            DX-CHEST-PORTABLE (1 VIEW)   Final Result      No acute cardiopulmonary abnormality identified.

## 2022-12-26 NOTE — ED TRIAGE NOTES
Pt comes in c/o asthma issue flair up  has not had issues for seveal years  as of late last couple of days haivng flairs of SOB and cough that does not produce anything  needing to use her inhalers but they are not keeping the flair ups down

## 2022-12-26 NOTE — ED NOTES
Patient reports having a bad reaction to a lot of steroids. States that a lot of steroids usually makes her knees hurt. She is requesting to get the prednisone tabs instead of solumedrol IVP. ERP made aware.

## 2022-12-26 NOTE — ED NOTES
Back from imaging, requesting to go to the bathroom. Ambulated to the bathroom independently, denies sob.

## 2022-12-26 NOTE — DISCHARGE INSTRUCTIONS
Take medication I am prescribing you as directed with food for the next 5 days.  Follow up with your primary care provider within a week for recheck.  Cool-mist humidifier at the bedside  Take the steroids and the Paxlovid as directed with food  Return if any worsening breathing.

## 2023-01-03 ENCOUNTER — OFFICE VISIT (OUTPATIENT)
Dept: MEDICAL GROUP | Facility: MEDICAL CENTER | Age: 63
End: 2023-01-03
Payer: COMMERCIAL

## 2023-01-03 VITALS
SYSTOLIC BLOOD PRESSURE: 98 MMHG | HEART RATE: 100 BPM | TEMPERATURE: 97.5 F | BODY MASS INDEX: 22.81 KG/M2 | HEIGHT: 69 IN | DIASTOLIC BLOOD PRESSURE: 64 MMHG | WEIGHT: 154 LBS | RESPIRATION RATE: 20 BRPM | OXYGEN SATURATION: 92 %

## 2023-01-03 DIAGNOSIS — J01.00 ACUTE NON-RECURRENT MAXILLARY SINUSITIS: ICD-10-CM

## 2023-01-03 DIAGNOSIS — J44.1 CHRONIC OBSTRUCTIVE PULMONARY DISEASE WITH ACUTE EXACERBATION (HCC): ICD-10-CM

## 2023-01-03 DIAGNOSIS — J20.9 ACUTE BRONCHITIS, UNSPECIFIED ORGANISM: ICD-10-CM

## 2023-01-03 DIAGNOSIS — U07.1 COVID-19 VIRUS INFECTION: ICD-10-CM

## 2023-01-03 PROCEDURE — 99214 OFFICE O/P EST MOD 30 MIN: CPT | Performed by: BEHAVIOR ANALYST

## 2023-01-03 RX ORDER — IPRATROPIUM BROMIDE AND ALBUTEROL SULFATE 2.5; .5 MG/3ML; MG/3ML
3 SOLUTION RESPIRATORY (INHALATION) ONCE
Status: DISCONTINUED | OUTPATIENT
Start: 2023-01-03 | End: 2023-01-03

## 2023-01-03 RX ORDER — DOXYCYCLINE 100 MG/1
100 CAPSULE ORAL 2 TIMES DAILY
Qty: 10 CAPSULE | Refills: 0 | Status: SHIPPED | OUTPATIENT
Start: 2023-01-03 | End: 2023-01-08

## 2023-01-03 RX ORDER — IPRATROPIUM BROMIDE AND ALBUTEROL SULFATE 2.5; .5 MG/3ML; MG/3ML
3 SOLUTION RESPIRATORY (INHALATION) EVERY 4 HOURS PRN
Qty: 1 EACH | Refills: 0 | Status: SHIPPED | OUTPATIENT
Start: 2023-01-03 | End: 2023-02-27

## 2023-01-03 RX ORDER — PREDNISONE 20 MG/1
40 TABLET ORAL DAILY
Qty: 10 TABLET | Refills: 0 | Status: SHIPPED | OUTPATIENT
Start: 2023-01-03 | End: 2023-01-08

## 2023-01-03 ASSESSMENT — PATIENT HEALTH QUESTIONNAIRE - PHQ9: CLINICAL INTERPRETATION OF PHQ2 SCORE: 0

## 2023-01-03 ASSESSMENT — FIBROSIS 4 INDEX: FIB4 SCORE: 1.27

## 2023-01-03 NOTE — PATIENT INSTRUCTIONS
Start doxycycline which is an antibiotic.   Take prednisone once daily for 5 days.    over-the-counter Mucinex and take to increase productivity of cough.   Chest x-ray if not feeling better  Would recommend a nebulizer to have as needed. I've sent in the solution.

## 2023-01-03 NOTE — PROGRESS NOTES
Chief Complaint   Patient presents with    Congestion     Fever, pt recently had covid     HPI:   Went to ER on 12/25 for SOB and she thought was an asthma attack. Work up was unremarkable except for positive for COVID infection. She was given nebulizer treatment which was very helpful and sent home with Paxlovid which she took as prescribed until the 31st with no side effects. Also prescibed 40mg prednisone which she took for 2 days total and did not take 3rd dose due to feeling well. High doses of prednisone make her joints take.   She felt well with no symptoms until last night she started coughing more, feeling more shortness of breath, facial pain, fever, nasal congestion, night sweats, wheezing. Cough was productive and then changed to nonproductive. Temp this morning- 100.4. Last took Advil around 8am and has no fever currently.   She does not have a nebulizer at home but does not think she needs it.  Used albuterol 2x yesterday due to SOB and wheezing. Last dose yesterday afternoon.   Mucus is: thick,  light yellow.  Similarly ill exposures: yes.  got sick 2 weeks ago.    Treatments tried: Advil, claritin (Paxlovid and prednisone last week  She  reports that she quit smoking about 3 years ago. Her smoking use included cigarettes. She has never used smokeless tobacco..     ROS:  No diarrhea  nausea, changes in bowel movements or skin rash.      I reviewed the patient's medications, allergies and medical history:  Current Outpatient Medications   Medication Sig Dispense Refill    predniSONE (DELTASONE) 20 MG Tab Take 20 mg by mouth 1 time a day as needed.      Nirmatrelvir&Ritonavir 300/100 (PAXLOVID, 300/100,) 20 x 150 MG & 10 x 100MG Tablet Therapy Pack Take 1 Tablet by mouth 2 times a day. 20 Each 0    budesonide-formoterol (SYMBICORT) 160-4.5 MCG/ACT Aerosol Inhale 1 Puff.      pantoprazole (PROTONIX) 40 MG Tablet Delayed Response TAKE 1 TABLET BY MOUTH EVERY DAY 90 Tablet 0    Fluticasone  "Propionate (FLONASE NA) Administer  into affected nostril(S).      alendronate (FOSAMAX) 70 MG Tab TAKE 1 TABLET BY MOUTH ONE TIME PER WEEK 12 Tablet 3    Calcium-Vitamin D-Vitamin K (VIACTIV CALCIUM PLUS D) 650-12.5-40 MG-MCG-MCG Chew Tab Chew.      albuterol 108 (90 Base) MCG/ACT Aero Soln inhalation aerosol       Apoaequorin (PREVAGEN PO) Take  by mouth.      SYMBICORT 160-4.5 MCG/ACT Aerosol 1 Puff 2 times a day.       No current facility-administered medications for this visit.     Cefzil [cefprozil]  Past Medical History:   Diagnosis Date    ASTHMA     COPD (chronic obstructive pulmonary disease) ([facility]) 10/4/2010    COPD (chronic obstructive pulmonary disease) ([facility]) 10/4/2010    Eczema 10/4/2010    Onychomycosis of toenail 4/14/2021    Osteopenia 10/4/2010    Recurrent acute sinusitis         EXAM:  BP 98/64 (BP Location: Left arm, Patient Position: Sitting)   Pulse 100   Temp 36.4 °C (97.5 °F) (Temporal)   Resp 20   Ht 1.753 m (5' 9\")   Wt 69.9 kg (154 lb)   SpO2 92%   General: Alert, no conversational dyspnea or audible wheeze, non-toxic appearance.  Eyes: PERRL, conjunctiva slightly injected, no eye discharge.  Ears: Normal pinnae,TM's with middle ear effusions without signs of infection bilaterally.  Nares: Patent with thick mucus.  Sinuses: tender over maxillary sinuses.  Throat: Erythematous injection without exudate.   Neck: Supple, with moderately enlarged cervical nodes.  Lungs: Wheezes with forceful expiration, No crackles or rhonchi.   Heart: Regular rate without murmur.  Skin: Warm and dry without rash.     ASSESSMENT: No diagnosis found.     1. Chronic obstructive pulmonary disease with acute exacerbation (HCC)  -Acute COPD exacerbation post COVID-19 infection- with fever and increased use of rescue inhaler over the last 24 hours.  At risk of decompensation.  - Start doxycycline for total 5-day course.   - Take prednisone 1-2x daily for 5 days.  Since she states high doses of prednisone give " her joint pain I recommended at least 1 tablet daily for a total of 5 days and not to stop early.  She can take up to 2 tablets a day if tolerating.   -  over-the-counter Mucinex and take to increase productivity of cough. OTC anti-pyretics and decongestants as needed.  - Chest x-ray ordered to complete if not feeling better with current regimen.   - Would recommend a nebulizer to have as needed.  Patient hesitant stating she rarely needs to use her albuterol inhaler and this usually works well.  However, she did go to the ER needing nebulizer treatment during current illness.  Therefore, I've sent in the [place] prescription and highly recommended she have this on hand. DME form completed for nebulizer equipment.   - doxycycline (MONODOX) 100 MG capsule; Take 1 Capsule by mouth 2 times a day for 5 days.  Dispense: 10 Capsule; Refill: 0  - predniSONE (DELTASONE) 20 MG Tab; Take 2 Tablets by mouth every day for 5 days.  Dispense: 10 Tablet; Refill: 0  - DX-CHEST-2 VIEWS; Future    3. Acute non-recurrent maxillary sinusitis  - doxycycline (MONODOX) 100 MG capsule; Take 1 Capsule by mouth 2 times a day for 5 days.  Dispense: 10 Capsule; Refill: 0    4. COVID-19 virus infection   -She completed Paxlovid therapy and was fairly asymptomatic from COVID until 3 days post completion of Paxlovid.  -Discussed with patient this could be a rebound of COVID symptoms with secondary infection or a new infection.  Either way she needs to be treated for a COPD exacerbation.      Follow-up in office or urgent care for worsening symptoms, difficulty breathing, lack of expected recovery, or should new symptoms or problems arise.    Otherwise follow up as planned.

## 2023-01-12 ENCOUNTER — HOSPITAL ENCOUNTER (OUTPATIENT)
Dept: RADIOLOGY | Facility: MEDICAL CENTER | Age: 63
End: 2023-01-12
Attending: BEHAVIOR ANALYST
Payer: COMMERCIAL

## 2023-01-12 DIAGNOSIS — Z12.31 ENCOUNTER FOR SCREENING MAMMOGRAM FOR MALIGNANT NEOPLASM OF BREAST: ICD-10-CM

## 2023-01-12 PROCEDURE — 77063 BREAST TOMOSYNTHESIS BI: CPT

## 2023-02-22 RX ORDER — PANTOPRAZOLE SODIUM 40 MG/1
40 TABLET, DELAYED RELEASE ORAL
Qty: 90 TABLET | Refills: 0 | Status: SHIPPED | OUTPATIENT
Start: 2023-02-22 | End: 2023-02-27 | Stop reason: SDUPTHER

## 2023-02-27 ENCOUNTER — OFFICE VISIT (OUTPATIENT)
Dept: MEDICAL GROUP | Facility: MEDICAL CENTER | Age: 63
End: 2023-02-27
Payer: COMMERCIAL

## 2023-02-27 VITALS
RESPIRATION RATE: 20 BRPM | DIASTOLIC BLOOD PRESSURE: 66 MMHG | TEMPERATURE: 97.6 F | OXYGEN SATURATION: 95 % | WEIGHT: 158.4 LBS | SYSTOLIC BLOOD PRESSURE: 104 MMHG | HEIGHT: 69 IN | HEART RATE: 77 BPM | BODY MASS INDEX: 23.46 KG/M2

## 2023-02-27 DIAGNOSIS — J44.9 CHRONIC OBSTRUCTIVE PULMONARY DISEASE, UNSPECIFIED COPD TYPE (HCC): ICD-10-CM

## 2023-02-27 DIAGNOSIS — R00.2 PALPITATIONS: ICD-10-CM

## 2023-02-27 DIAGNOSIS — R73.9 HYPERGLYCEMIA: ICD-10-CM

## 2023-02-27 DIAGNOSIS — Z86.39 HISTORY OF THYROID NODULE: ICD-10-CM

## 2023-02-27 DIAGNOSIS — M81.0 AGE-RELATED OSTEOPOROSIS WITHOUT CURRENT PATHOLOGICAL FRACTURE: ICD-10-CM

## 2023-02-27 DIAGNOSIS — Z00.00 WELLNESS EXAMINATION: ICD-10-CM

## 2023-02-27 DIAGNOSIS — E04.1 THYROID NODULE: ICD-10-CM

## 2023-02-27 PROBLEM — U07.1 COVID-19 VIRUS INFECTION: Status: RESOLVED | Noted: 2023-01-03 | Resolved: 2023-02-27

## 2023-02-27 PROCEDURE — 99214 OFFICE O/P EST MOD 30 MIN: CPT | Performed by: BEHAVIOR ANALYST

## 2023-02-27 RX ORDER — ALENDRONATE SODIUM 70 MG/1
TABLET ORAL
Qty: 12 TABLET | Refills: 3 | Status: SHIPPED | OUTPATIENT
Start: 2023-02-27 | End: 2024-02-14

## 2023-02-27 RX ORDER — ALBUTEROL SULFATE 90 UG/1
1-2 AEROSOL, METERED RESPIRATORY (INHALATION) EVERY 4 HOURS PRN
Qty: 1 EACH | Refills: 2 | Status: SHIPPED | OUTPATIENT
Start: 2023-02-27

## 2023-02-27 RX ORDER — PANTOPRAZOLE SODIUM 40 MG/1
40 TABLET, DELAYED RELEASE ORAL
Qty: 90 TABLET | Refills: 3 | Status: SHIPPED | OUTPATIENT
Start: 2023-02-27 | End: 2023-08-28

## 2023-02-27 ASSESSMENT — FIBROSIS 4 INDEX: FIB4 SCORE: 1.27

## 2023-02-27 NOTE — PROGRESS NOTES
Subjective:     CC:    Chief Complaint   Patient presents with    COPD     Follow up          HISTORY OF THE PRESENT ILLNESS:   Sita presents today with    Problem   Hyperglycemia    Elevated blood sugar on last labs completed in ER 12/2022     Palpitations    Continues to feel palpitations at night when she is lying still in bed.  They have not worsened.  She saw cardiology for this and evaluation was unremarkable.  She was advised to drink more water.   Does drink quite a bit of diet coke.   Exercise classes twice a week with no palpitations.          History of Thyroid Nodule    Last imaging completed 11/2022 showed absence of thyroid gland abnormalities.  Reports some chronic difficulty swallowing however states this sensation has not changed over the past several years.  She relates this to her acid reflux due to liver cyst.       Copd (Chronic Obstructive Pulmonary Disease) (Hcc)    Continues to use Symbicort, one puff, twice daily. Back to using the albuterol 1-x a day with a recent URI she developed last week.   Dr. Crowley is her pulmonologist. Needs a follow up and get PFT and low-dose lung cancer screening.   Reports hx of concurrent asthma   Stopped smoking in 2019.        Osteoporosis    She slipped and fell on the ice this past Friday. She fell on her hip and twisted her foot. Broke bone in lateral foot and has large bruise on right hip but thankfully no other fractures.   Continues to take fosamax once weekly. . No side effects with biophosphonate.   Continues to take vitamin D and calcium supplement with WNL vit D in 2022.   Started 4/2021 based on last dexa.   FINDINGS: lumbar spine T score of -4.1; femur T score of -3.3          Covid-19 Virus Infection (Resolved)    Went to ER on 12/25 for SOB and she thought was an asthma attack. Work up was unremarkable except for positive for COVID infection. She was given nebulizer treatment which was very helpful and sent home with Paxlovid which she took as  "prescribed until the 31st with no side effects. Also prescibed 40mg prednisone which she took for 2 days total and did not take 3rd dose due to feeling well.   She felt well with no symptoms until last night she started coughing more, feeling more shortness of breath, facial pain, fever, nasal congestion, night sweats, wheezing. Cough was productive and then changed to nonproductive. Temp this morning- 100.4. Last took Advil around 8am and has no fever currently.            Current Outpatient Medications   Medication Sig    alendronate (FOSAMAX) 70 MG Tab TAKE 1 TABLET BY MOUTH ONE TIME PER WEEK    albuterol 108 (90 Base) MCG/ACT Aero Soln inhalation aerosol Inhale 1-2 Puffs every four hours as needed for Shortness of Breath.    pantoprazole (PROTONIX) 40 MG Tablet Delayed Response Take 1 Tablet by mouth every day.    Calcium-Vitamin D-Vitamin K (VIACTIV CALCIUM PLUS D) 650-12.5-40 MG-MCG-MCG Chew Tab Chew.    SYMBICORT 160-4.5 MCG/ACT Aerosol 1 Puff 2 times a day.        Health Maintenance: Completed    ROS: See HPI  ROS      Objective:     Exam: /66 (BP Location: Left arm, Patient Position: Sitting, BP Cuff Size: Adult long)   Pulse 77   Temp 36.4 °C (97.6 °F) (Temporal)   Resp 20   Ht 1.753 m (5' 9\") Comment: Pt reported  Wt 71.8 kg (158 lb 6.4 oz)   LMP 04/02/2008   SpO2 95%   BMI 23.39 kg/m²   Body mass index is 23.39 kg/m².    Physical Exam  Constitutional:       Appearance: Normal appearance.   Cardiovascular:      Rate and Rhythm: Normal rate and regular rhythm.      Pulses: Normal pulses.      Heart sounds: Normal heart sounds.   Pulmonary:      Effort: Pulmonary effort is normal.      Breath sounds: Wheezing present.      Comments: Left lower lobe wheezing  Musculoskeletal:      Cervical back: Normal range of motion and neck supple.   Neurological:      Mental Status: She is alert.              Assessment & Plan:     62 y.o. female with the following -     1. Wellness examination  - CBC " WITHOUT DIFFERENTIAL; Future  - Comp Metabolic Panel; Future  - HEMOGLOBIN A1C; Future  - VITAMIN B12; Future    2. Hyperglycemia  - HEMOGLOBIN A1C; Future    3. Chronic obstructive pulmonary disease, unspecified COPD type (HCC)  - Chronic problem with current mild exacerbation due to URI.  -Recommended increasing Symbicort to 2 puffs twice a day for the next week while recovering from illness.  Continue albuterol as needed.  Again recommended nebulizer, however patient declining.  - Recommended follow-up with pulmonary.  Patient states she will call to schedule  - albuterol 108 (90 Base) MCG/ACT Aero Soln inhalation aerosol; Inhale 1-2 Puffs every four hours as needed for Shortness of Breath.  Dispense: 1 Each; Refill: 2    4. Age-related osteoporosis without current pathological fracture  -Chronic, controlled with alendronate and supplemental calcium and vitamin D.  -Continue weight bearing and resistance training exercises (30 minutes on most days of the week).   -It will be 2 years in April since her last bone scan.  Order placed so she can schedule this after the 2-year keyla.  - DS-BONE DENSITY STUDY (DEXA); Future    5.  History of thyroid nodule  - TSH WITH REFLEX TO FT4; Future    6. Palpitations  Chronic problem, stable. Continue to recommend increasing hydration and decreasing caffeine and Diet Coke intake.            Return in about 6 months (around 8/27/2023) for Annual preventative.    Please note that this dictation was created using voice recognition software. I have made every reasonable attempt to correct obvious errors, but I expect that there are errors of grammar and possibly content that I did not discover before finalizing the note.

## 2023-02-28 ENCOUNTER — APPOINTMENT (OUTPATIENT)
Dept: MEDICAL GROUP | Facility: MEDICAL CENTER | Age: 63
End: 2023-02-28
Payer: COMMERCIAL

## 2023-03-23 ENCOUNTER — TELEPHONE (OUTPATIENT)
Dept: HEALTH INFORMATION MANAGEMENT | Facility: OTHER | Age: 63
End: 2023-03-23
Payer: COMMERCIAL

## 2023-04-13 ENCOUNTER — HOSPITAL ENCOUNTER (OUTPATIENT)
Dept: RADIOLOGY | Facility: MEDICAL CENTER | Age: 63
End: 2023-04-13
Attending: BEHAVIOR ANALYST
Payer: MEDICARE

## 2023-04-13 DIAGNOSIS — M81.0 AGE-RELATED OSTEOPOROSIS WITHOUT CURRENT PATHOLOGICAL FRACTURE: ICD-10-CM

## 2023-04-13 PROCEDURE — 77080 DXA BONE DENSITY AXIAL: CPT

## 2023-04-25 PROBLEM — I70.0 ATHEROSCLEROSIS OF AORTA (HCC): Status: ACTIVE | Noted: 2023-04-25

## 2023-05-04 ENCOUNTER — OFFICE VISIT (OUTPATIENT)
Dept: URGENT CARE | Facility: CLINIC | Age: 63
End: 2023-05-04
Payer: MEDICARE

## 2023-05-04 VITALS
BODY MASS INDEX: 23.49 KG/M2 | WEIGHT: 155 LBS | HEART RATE: 80 BPM | SYSTOLIC BLOOD PRESSURE: 128 MMHG | TEMPERATURE: 98.9 F | OXYGEN SATURATION: 93 % | RESPIRATION RATE: 16 BRPM | HEIGHT: 68 IN | DIASTOLIC BLOOD PRESSURE: 82 MMHG

## 2023-05-04 DIAGNOSIS — J01.90 ACUTE NON-RECURRENT SINUSITIS, UNSPECIFIED LOCATION: ICD-10-CM

## 2023-05-04 DIAGNOSIS — J22 LRTI (LOWER RESPIRATORY TRACT INFECTION): ICD-10-CM

## 2023-05-04 DIAGNOSIS — J45.909 ASTHMA, UNSPECIFIED ASTHMA SEVERITY, UNSPECIFIED WHETHER COMPLICATED, UNSPECIFIED WHETHER PERSISTENT: ICD-10-CM

## 2023-05-04 PROCEDURE — 99214 OFFICE O/P EST MOD 30 MIN: CPT | Performed by: NURSE PRACTITIONER

## 2023-05-04 RX ORDER — AMOXICILLIN AND CLAVULANATE POTASSIUM 875; 125 MG/1; MG/1
1 TABLET, FILM COATED ORAL 2 TIMES DAILY
Qty: 14 TABLET | Refills: 0 | Status: SHIPPED | OUTPATIENT
Start: 2023-05-04 | End: 2023-05-11

## 2023-05-04 RX ORDER — PREDNISONE 20 MG/1
20 TABLET ORAL DAILY
Qty: 5 TABLET | Refills: 0 | Status: SHIPPED | OUTPATIENT
Start: 2023-05-04 | End: 2023-05-09

## 2023-05-04 ASSESSMENT — ENCOUNTER SYMPTOMS
CONSTITUTIONAL NEGATIVE: 1
SORE THROAT: 1
COUGH: 1
SHORTNESS OF BREATH: 1
WHEEZING: 1
FEVER: 0
SPUTUM PRODUCTION: 1

## 2023-05-04 ASSESSMENT — FIBROSIS 4 INDEX: FIB4 SCORE: 1.29

## 2023-05-04 ASSESSMENT — VISUAL ACUITY: OU: 1

## 2023-05-04 ASSESSMENT — COPD QUESTIONNAIRES: COPD: 1

## 2023-05-04 NOTE — PROGRESS NOTES
Subjective:     BARTOLO LAGUNAS is a 63 y.o. female who presents for Sore Throat (Sore throat x 2 weeks , loss of voice and green phlegm )       Cough  This is a new problem. The problem has been gradually worsening. The cough is Productive of sputum (Green). Associated symptoms include ear pain (Left), a sore throat, shortness of breath and wheezing. Pertinent negatives include no fever or nasal congestion. She has tried a beta-agonist inhaler (And Symbicort) for the symptoms. Her past medical history is significant for asthma and COPD.     Negative home Covid test. SpO2 at home 92%.    Review of Systems   Constitutional: Negative.  Negative for fever and malaise/fatigue.   HENT:  Positive for ear pain (Left) and sore throat. Negative for congestion.    Respiratory:  Positive for cough, sputum production, shortness of breath and wheezing.    All other systems reviewed and are negative.    Refer to [facility] [place] for additional details.    During this visit, appropriate PPE was worn, hand hygiene was performed, and the patient and any visitors were masked.    PMH:  has a past medical history of ASTHMA, COPD (chronic obstructive pulmonary disease) ([facility]) (10/4/2010), COPD (chronic obstructive pulmonary disease) ([facility]) (10/4/2010), COVID-19 virus infection (1/3/2023), Eczema (10/4/2010), Onychomycosis of toenail (4/14/2021), Osteopenia (10/4/2010), and Recurrent acute sinusitis.    She has no past medical history of CAD (coronary artery disease), Cancer ([facility]), Congestive heart failure ([facility]), Diabetes, Hypertension, Liver disease, Psychiatric disorder, Renal disorder, Seizure disorder ([facility]), or Stroke ([facility]).    MEDS:   Current Outpatient Medications:     amoxicillin-clavulanate (AUGMENTIN) 875-125 MG Tab, Take 1 Tablet by mouth 2 times a day for 7 days., Disp: 14 Tablet, Rfl: 0    predniSONE (DELTASONE) 20 MG Tab, Take 1 Tablet by mouth every day for 5 days., Disp: 5 Tablet, Rfl: 0    alendronate (FOSAMAX) 70 MG Tab, TAKE 1  "TABLET BY MOUTH ONE TIME PER WEEK, Disp: 12 Tablet, Rfl: 3    albuterol 108 (90 Base) MCG/ACT Aero Soln inhalation aerosol, Inhale 1-2 Puffs every four hours as needed for Shortness of Breath., Disp: 1 Each, Rfl: 2    pantoprazole (PROTONIX) 40 MG Tablet Delayed Response, Take 1 Tablet by mouth every day., Disp: 90 Tablet, Rfl: 3    Calcium-Vitamin D-Vitamin K (VIACTIV CALCIUM PLUS D) 650-12.5-40 MG-MCG-MCG Chew Tab, Chew., Disp: , Rfl:     SYMBICORT 160-4.5 MCG/ACT Aerosol, 1 Puff 2 times a day., Disp: , Rfl:     ALLERGIES:   Allergies   Allergen Reactions    Cefzil [Cefprozil]      nausea     SURGHX: History reviewed. No pertinent surgical history.  SOCHX:  reports that she quit smoking about 4 years ago. Her smoking use included cigarettes. She has never used smokeless tobacco. She reports that she does not drink alcohol and does not use drugs.    FH: Per HPI as applicable/pertinent.      Objective:     /82 (BP Location: Left arm, Patient Position: Sitting, BP Cuff Size: Adult)   Pulse 80   Temp 37.2 °C (98.9 °F) (Temporal)   Resp 16   Ht 1.727 m (5' 8\")   Wt 70.3 kg (155 lb)   LMP 04/02/2008   SpO2 93%   BMI 23.57 kg/m²     Physical Exam  Nursing note reviewed.   Constitutional:       General: She is not in acute distress.     Appearance: She is well-developed. She is not ill-appearing or toxic-appearing.   HENT:      Head: Normocephalic.      Right Ear: Tympanic membrane normal.      Left Ear: Tympanic membrane normal.      Nose:      Right Sinus: Maxillary sinus tenderness present.      Left Sinus: Maxillary sinus tenderness present.      Mouth/Throat:      Mouth: Mucous membranes are moist.      Pharynx: Uvula midline. Pharyngeal swelling and posterior oropharyngeal erythema present.   Eyes:      General: Vision grossly intact.      Extraocular Movements: Extraocular movements intact.      Conjunctiva/sclera: Conjunctivae normal.   Cardiovascular:      Rate and Rhythm: Normal rate and regular " rhythm.      Heart sounds: Normal heart sounds.   Pulmonary:      Effort: Pulmonary effort is normal. No respiratory distress.      Breath sounds: Wheezing and rhonchi present. No decreased breath sounds.   Musculoskeletal:         General: No deformity. Normal range of motion.      Cervical back: Normal range of motion.   Skin:     General: Skin is warm and dry.      Coloration: Skin is not pale.   Neurological:      Mental Status: She is alert and oriented to person, place, and time.      Motor: No weakness.   Psychiatric:         Behavior: Behavior normal. Behavior is cooperative.       Assessment/Plan:     1. Asthma, unspecified asthma severity, unspecified whether complicated, unspecified whether persistent  - predniSONE (DELTASONE) 20 MG Tab; Take 1 Tablet by mouth every day for 5 days.  Dispense: 5 Tablet; Refill: 0    2. Acute non-recurrent sinusitis, unspecified location  - amoxicillin-clavulanate (AUGMENTIN) 875-125 MG Tab; Take 1 Tablet by mouth 2 times a day for 7 days.  Dispense: 14 Tablet; Refill: 0  - predniSONE (DELTASONE) 20 MG Tab; Take 1 Tablet by mouth every day for 5 days.  Dispense: 5 Tablet; Refill: 0    3. LRTI (lower respiratory tract infection)  - amoxicillin-clavulanate (AUGMENTIN) 875-125 MG Tab; Take 1 Tablet by mouth 2 times a day for 7 days.  Dispense: 14 Tablet; Refill: 0    Rx as above sent electronically. Continue albuterol and COPD inhaler. May start OTC Mucinex.    Differential diagnosis, natural history, supportive care, rest, fluids, over-the-counter symptom management per 's instructions, close monitoring, and indications for immediate follow-up discussed.     Vital signs stable, afebrile, no acute distress at this time. Warning signs reviewed. Return precautions discussed.     All questions answered. Patient agrees with the plan of care.    Discharge summary provided.     Billing note: chronic illness with exacerbation/progression; prescription drug management.  Established patient. [id number]. Please refer to LOS tool for details.

## 2023-07-26 ENCOUNTER — OFFICE VISIT (OUTPATIENT)
Dept: MEDICAL GROUP | Facility: MEDICAL CENTER | Age: 63
End: 2023-07-26
Payer: MEDICARE

## 2023-07-26 VITALS
OXYGEN SATURATION: 91 % | RESPIRATION RATE: 12 BRPM | SYSTOLIC BLOOD PRESSURE: 98 MMHG | HEART RATE: 86 BPM | TEMPERATURE: 98 F | HEIGHT: 69 IN | WEIGHT: 151 LBS | BODY MASS INDEX: 22.36 KG/M2 | DIASTOLIC BLOOD PRESSURE: 64 MMHG

## 2023-07-26 DIAGNOSIS — R22.1 LOCALIZED SWELLING, MASS OR LUMP OF NECK: ICD-10-CM

## 2023-07-26 DIAGNOSIS — E78.5 DYSLIPIDEMIA: ICD-10-CM

## 2023-07-26 DIAGNOSIS — Z87.891 PERSONAL HISTORY OF TOBACCO USE: ICD-10-CM

## 2023-07-26 DIAGNOSIS — Z86.39 HISTORY OF THYROID NODULE: Chronic | ICD-10-CM

## 2023-07-26 DIAGNOSIS — J44.9 CHRONIC OBSTRUCTIVE PULMONARY DISEASE, UNSPECIFIED COPD TYPE (HCC): ICD-10-CM

## 2023-07-26 PROBLEM — R21 RASH AND NONSPECIFIC SKIN ERUPTION: Status: RESOLVED | Noted: 2021-06-30 | Resolved: 2023-07-26

## 2023-07-26 PROCEDURE — 3078F DIAST BP <80 MM HG: CPT | Performed by: BEHAVIOR ANALYST

## 2023-07-26 PROCEDURE — 99214 OFFICE O/P EST MOD 30 MIN: CPT | Performed by: BEHAVIOR ANALYST

## 2023-07-26 PROCEDURE — 3074F SYST BP LT 130 MM HG: CPT | Performed by: BEHAVIOR ANALYST

## 2023-07-26 RX ORDER — BUDESONIDE AND FORMOTEROL FUMARATE DIHYDRATE 160; 4.5 UG/1; UG/1
2 AEROSOL RESPIRATORY (INHALATION) 2 TIMES DAILY
Qty: 10.2 G | Refills: 6 | Status: SHIPPED | OUTPATIENT
Start: 2023-07-26 | End: 2024-03-06

## 2023-07-26 ASSESSMENT — FIBROSIS 4 INDEX: FIB4 SCORE: 1.29

## 2023-07-26 NOTE — PROGRESS NOTES
Subjective:     CC:    Chief Complaint   Patient presents with    Lump     In her throat (R) side thyroid area noticed it 3 days ago    Medication Refill          HISTORY OF THE PRESENT ILLNESS:   BARTOLO presents today with    Problem   Localized Swelling, Mass Or Lump of Neck    Onset 3 days ago. Right side of throat can feel pain with palpation. No pain without palpation. No difficult swallowing or pain. No pain with neck movements.   She has chronic allergies and is a little congested but controlled with OTC allergy meds.   No fevers or chills.      Personal History of Tobacco Use   History of Thyroid Nodule    About 3-4 years ago she had a CT scan while hospitalized for stomach issues and was found to have thyroid nodules and was sent to [facility]. She had biopsy that was non concerning at the time.  She had follow up imaging a few years later and was sent for biopsy again however the radiologist could never find anything on ultrasound to biopsy. Last imaging completed 11/2022 showed absence of thyroid gland abnormalities.  Reports some chronic difficulty swallowing however states this sensation has not changed over the past several years.  She relates this to her acid reflux due to liver cyst       Copd (Chronic Obstructive Pulmonary Disease) (Hcc)    Continues to use Symbicort, one puff, twice daily. She admits to having to use albuterol inhaler 2-3x a week for breathing issues. She reports her breathing has not been the same since COVID in December.   Dr. Crowley is her pulmonologist. Needs a follow up and get PFT and low-dose lung cancer screening.    Stopped smoking in 2019.        Rash and Nonspecific Skin Eruption (Resolved)   Sinusitis (Resolved)       Current Outpatient Medications   Medication Sig    SYMBICORT 160-4.5 MCG/ACT Aerosol Inhale 2 Puffs 2 times a day.    alendronate (FOSAMAX) 70 MG Tab TAKE 1 TABLET BY MOUTH ONE TIME PER WEEK    albuterol 108 (90 Base) MCG/ACT Aero Soln inhalation aerosol  "Inhale 1-2 Puffs every four hours as needed for Shortness of Breath.    pantoprazole (PROTONIX) 40 MG Tablet Delayed Response Take 1 Tablet by mouth every day.    Calcium-Vitamin D-Vitamin K (VIACTIV CALCIUM PLUS D) 650-12.5-40 MG-MCG-MCG Chew Tab Chew.          ROS: See HPI        Objective:     Exam: BP 98/64 (BP Location: Left arm, Patient Position: Sitting, BP Cuff Size: Adult long)   Pulse 86   Temp 36.7 °C (98 °F) (Temporal)   Resp 12   Ht 1.753 m (5' 9\")   Wt 68.5 kg (151 lb)   LMP 04/02/2008   SpO2 91%   BMI 22.30 kg/m²   Body mass index is 22.3 kg/m².    Physical Exam  Constitutional:       Appearance: Normal appearance.   Neck:      Comments: Tenderness to mid portion of the right anterior cervical lymph node chain without palpable mass.   Cardiovascular:      Rate and Rhythm: Normal rate and regular rhythm.      Pulses: Normal pulses.      Heart sounds: Normal heart sounds.   Pulmonary:      Effort: Pulmonary effort is normal.      Breath sounds: Normal breath sounds.   Musculoskeletal:      Cervical back: Normal range of motion and neck supple.   Neurological:      Mental Status: She is alert.                Assessment & Plan:     63 y.o. female with the following -     1. Localized swelling, mass or lump of neck  - New problem. Hx of thyroid nodules. Pain at the mid portion of the right anterior cervical lymph node chain without palpable mass with hx of enlarge right reactive lymph node.   - US-THYROID; Future    2. Chronic obstructive pulmonary disease, unspecified COPD type (HCC)  - Chronic, worsening problem. No longer established with [facility] pulm. We will refer to [facility] pulmonary.   - increase symbicort to 2 puffs twice daily.   - Updated PFT to incorporate with pulmonary consultation.   - SYMBICORT 160-4.5 MCG/ACT Aerosol; Inhale 2 Puffs 2 times a day.  Dispense: 10.2 g; Refill: 6  - Referral to Pulmonary and Sleep Medicine  - PULMONARY FUNCTION TESTS -Test requested: Complete " Pulmonary Function Test; Future    3. History of thyroid nodule  - US-THYROID; Future    4. Personal history of tobacco use  - She qualifies for lung cancer screening and desires screening.   - REFERRAL TO LUNG CANCER SCREENING PROGRAM    5. Dyslipidemia  - Lipid Profile; Future      HCC Gap Form    Diagnosis to address: I47.1 - Supraventricular tachycardia (HCC)  Assessment and plan: Chronic, stable. Continue with current defined treatment plan: Stay well-hydrated and avoid excess sodium and caffeine intake. Follow-up at least annually.  Last edited 07/26/23 16:22 PDT by MARISA Donaldson.       I spent a total of 35 minutes with record review, exam, communication with the patient, communication with other providers, and documentation of this encounter.      Return for as  scheduled 8/28.    Please note that this dictation was created using voice recognition software. I have made every reasonable attempt to correct obvious errors, but I expect that there are errors of grammar and possibly content that I did not discover before finalizing the note.

## 2023-08-04 ENCOUNTER — HOSPITAL ENCOUNTER (OUTPATIENT)
Dept: RADIOLOGY | Facility: MEDICAL CENTER | Age: 63
End: 2023-08-04
Payer: MEDICARE

## 2023-08-09 ENCOUNTER — HOSPITAL ENCOUNTER (OUTPATIENT)
Dept: LAB | Facility: MEDICAL CENTER | Age: 63
End: 2023-08-09
Attending: BEHAVIOR ANALYST
Payer: MEDICARE

## 2023-08-09 ENCOUNTER — HOSPITAL ENCOUNTER (OUTPATIENT)
Dept: RADIOLOGY | Facility: MEDICAL CENTER | Age: 63
End: 2023-08-09
Attending: BEHAVIOR ANALYST
Payer: MEDICARE

## 2023-08-09 DIAGNOSIS — Z86.39 HISTORY OF THYROID NODULE: ICD-10-CM

## 2023-08-09 DIAGNOSIS — Z00.00 WELLNESS EXAMINATION: ICD-10-CM

## 2023-08-09 DIAGNOSIS — R22.1 LOCALIZED SWELLING, MASS OR LUMP OF NECK: ICD-10-CM

## 2023-08-09 DIAGNOSIS — R73.9 HYPERGLYCEMIA: ICD-10-CM

## 2023-08-09 DIAGNOSIS — E78.5 DYSLIPIDEMIA: ICD-10-CM

## 2023-08-09 DIAGNOSIS — E04.1 THYROID NODULE: ICD-10-CM

## 2023-08-09 LAB
ALBUMIN SERPL BCP-MCNC: 4.2 G/DL (ref 3.2–4.9)
ALBUMIN/GLOB SERPL: 1.8 G/DL
ALP SERPL-CCNC: 71 U/L (ref 30–99)
ALT SERPL-CCNC: 8 U/L (ref 2–50)
ANION GAP SERPL CALC-SCNC: 9 MMOL/L (ref 7–16)
AST SERPL-CCNC: 12 U/L (ref 12–45)
BILIRUB SERPL-MCNC: 0.4 MG/DL (ref 0.1–1.5)
BUN SERPL-MCNC: 13 MG/DL (ref 8–22)
CALCIUM ALBUM COR SERPL-MCNC: 9.4 MG/DL (ref 8.5–10.5)
CALCIUM SERPL-MCNC: 9.6 MG/DL (ref 8.5–10.5)
CHLORIDE SERPL-SCNC: 107 MMOL/L (ref 96–112)
CHOLEST SERPL-MCNC: 191 MG/DL (ref 100–199)
CO2 SERPL-SCNC: 26 MMOL/L (ref 20–33)
CREAT SERPL-MCNC: 0.69 MG/DL (ref 0.5–1.4)
ERYTHROCYTE [DISTWIDTH] IN BLOOD BY AUTOMATED COUNT: 43 FL (ref 35.9–50)
EST. AVERAGE GLUCOSE BLD GHB EST-MCNC: 123 MG/DL
FASTING STATUS PATIENT QL REPORTED: NORMAL
GFR SERPLBLD CREATININE-BSD FMLA CKD-EPI: 96 ML/MIN/1.73 M 2
GLOBULIN SER CALC-MCNC: 2.4 G/DL (ref 1.9–3.5)
GLUCOSE SERPL-MCNC: 101 MG/DL (ref 65–99)
HBA1C MFR BLD: 5.9 % (ref 4–5.6)
HCT VFR BLD AUTO: 44 % (ref 37–47)
HDLC SERPL-MCNC: 40 MG/DL
HGB BLD-MCNC: 14 G/DL (ref 12–16)
LDLC SERPL CALC-MCNC: 138 MG/DL
MCH RBC QN AUTO: 29.1 PG (ref 27–33)
MCHC RBC AUTO-ENTMCNC: 31.8 G/DL (ref 32.2–35.5)
MCV RBC AUTO: 91.5 FL (ref 81.4–97.8)
PLATELET # BLD AUTO: 232 K/UL (ref 164–446)
PMV BLD AUTO: 10.4 FL (ref 9–12.9)
POTASSIUM SERPL-SCNC: 4.2 MMOL/L (ref 3.6–5.5)
PROT SERPL-MCNC: 6.6 G/DL (ref 6–8.2)
RBC # BLD AUTO: 4.81 M/UL (ref 4.2–5.4)
SODIUM SERPL-SCNC: 142 MMOL/L (ref 135–145)
TRIGL SERPL-MCNC: 63 MG/DL (ref 0–149)
TSH SERPL DL<=0.005 MIU/L-ACNC: 2.11 UIU/ML (ref 0.38–5.33)
VIT B12 SERPL-MCNC: 446 PG/ML (ref 211–911)
WBC # BLD AUTO: 5.6 K/UL (ref 4.8–10.8)

## 2023-08-09 PROCEDURE — 84443 ASSAY THYROID STIM HORMONE: CPT

## 2023-08-09 PROCEDURE — 82607 VITAMIN B-12: CPT

## 2023-08-09 PROCEDURE — 80061 LIPID PANEL: CPT

## 2023-08-09 PROCEDURE — 80053 COMPREHEN METABOLIC PANEL: CPT

## 2023-08-09 PROCEDURE — 83036 HEMOGLOBIN GLYCOSYLATED A1C: CPT

## 2023-08-09 PROCEDURE — 76536 US EXAM OF HEAD AND NECK: CPT

## 2023-08-09 PROCEDURE — 36415 COLL VENOUS BLD VENIPUNCTURE: CPT

## 2023-08-09 PROCEDURE — 85027 COMPLETE CBC AUTOMATED: CPT

## 2023-08-14 ENCOUNTER — TELEPHONE (OUTPATIENT)
Dept: HEALTH INFORMATION MANAGEMENT | Facility: OTHER | Age: 63
End: 2023-08-14
Payer: MEDICARE

## 2023-08-15 ENCOUNTER — TELEPHONE (OUTPATIENT)
Dept: SLEEP MEDICINE | Facility: MEDICAL CENTER | Age: 63
End: 2023-08-15
Payer: MEDICARE

## 2023-08-15 NOTE — TELEPHONE ENCOUNTER
Called patient to confirm that she is eligible for a follow-up LDCT for lung cancer screening.  We have record that she had a a CT-LCS on 11/12/20 with Lung Rads 2 result.  She had a CTA chest pulmonary 12/25/22 with no nodule noted.  Since patient did not answer, will plan to call back to ensure she is eligible for follow-up CT-lung cancer screening to be done after 12/26/23. -Dr. Merle Olvera

## 2023-08-17 ENCOUNTER — TELEPHONE (OUTPATIENT)
Dept: SLEEP MEDICINE | Facility: MEDICAL CENTER | Age: 63
End: 2023-08-17
Payer: MEDICARE

## 2023-08-17 NOTE — TELEPHONE ENCOUNTER
SDM appt never done per patient report despite her having a CT for lung cancer screening in the past.  She states that she was never told the risks and/or benefits of screening and is agreeable to coming in for a SDM visit.  Message sent to pulmonary scheduling team to get patient scheduled for a lung cancer screening shared decision making visit.  -Dr. Merle Olvera

## 2023-08-17 NOTE — TELEPHONE ENCOUNTER
Q&A    1. Age 50-77 yrs of age? Yes          2. Total Years Smoking cigarettes? 39 years         3. 20 pack year hx of smoking, or greater (average packs per day)?  1 pack a day         4. Current smoker or if quit, has pt quit within last 15 yrs? Quit smoking 5 years ago         5. Completed Lung Cancer screen CT with [facility] previously? No         6. Anything noted on previous CT involving lungs? (Nodules, Mass, Etc.) Nodules         7. Any signs or symptoms of lung cancer? (New / change to Cough, Wheezing, S.O.B., coughing up blood, unexplained weight loss within last year)?  No         8. Previous history of lung cancer? No

## 2023-08-18 NOTE — PROGRESS NOTES
Subjective     Sita Medina is a 63 y.o. female who presents with Lung Cancer (Lung cancer screening program prescreen/nicotine dependence )            HPI  Patient seen today for initial lung cancer screening visit. Patient referred by her PCP, Day COLE.     The patient meets eligibility criteria including age, smoking history (20+ pack years), if former smoker, quit in the last 15 years, and absence of signs or symptoms of lung cancer.  Of note, she states that she has had Cts for lung cancer screening in the past, but never had a shared decision making visit    - Age - 63  - Smoking history - Patient has smoked for 30 years at an average of 3/4 ppd = 22.5 pack year smoking history.  - Current smoking status - former smoker, quit 2/13/2019  - No symptoms of lung cancer and no previous history of lung cancer     Review of Systems   Constitutional:  Negative for chills, fever and weight loss.   Respiratory:  Positive for cough. Negative for hemoptysis, sputum production, shortness of breath and wheezing.    Cardiovascular:  Negative for chest pain and palpitations.   Very mild cough over the last 3 weeks but this resolved this AM.  She recently increased her symbicort.   She has chronic mild SOB with climbing stairs with no recent changes.    Allergies   Allergen Reactions    Cefzil [Cefprozil]      nausea       Current Outpatient Medications on File Prior to Visit   Medication Sig Dispense Refill    SYMBICORT 160-4.5 MCG/ACT Aerosol Inhale 2 Puffs 2 times a day. 10.2 g 6    alendronate (FOSAMAX) 70 MG Tab TAKE 1 TABLET BY MOUTH ONE TIME PER WEEK 12 Tablet 3    albuterol 108 (90 Base) MCG/ACT Aero Soln inhalation aerosol Inhale 1-2 Puffs every four hours as needed for Shortness of Breath. 1 Each 2    pantoprazole (PROTONIX) 40 MG Tablet Delayed Response Take 1 Tablet by mouth every day. 90 Tablet 3    Calcium-Vitamin D-Vitamin K (VIACTIV CALCIUM PLUS D) 650-12.5-40 MG-MCG-MCG Chew Tab Chew.   "     No current facility-administered medications on file prior to visit.              Objective     /62 (BP Location: Right arm, Patient Position: Sitting, BP Cuff Size: Adult)   Pulse 70   Resp 16   Ht 1.727 m (5' 8\")   Wt 70.8 kg (156 lb)   LMP 04/02/2008   SpO2 97%   BMI 23.72 kg/m²      Physical Exam  Constitutional:       Appearance: Normal appearance.   Cardiovascular:      Rate and Rhythm: Normal rate and regular rhythm.   Pulmonary:      Effort: Pulmonary effort is normal.      Breath sounds: Normal breath sounds.   Musculoskeletal:         General: No swelling.   Neurological:      Mental Status: She is alert.                             Assessment & Plan        1. Personal history of tobacco use, presenting hazards to health  CT-LUNG CANCER-SCREENING             We conducted a shared decision-making process using a decision aid. We reviewed benefits and harms of screening, including false positives and potential need for additional diagnostic testing, the possibility of over diagnosis, and total radiation exposure.    We discussed the importance of adhering to annual LDCT screening. We also discussed the impact of comorbities on the patient's the ability or willingness to undergo diagnostic procedure(s) and treatment.    Counseling on the importance of maintaining cigarette smoking abstinence if former smoker; or the importance of smoking cessation if current smoker and, if appropriate, furnishing of information about tobacco cessation interventions.    Based on our discussion, we have decided to begin annual lung cancer screening starting now.    No follow-up needed unless imaging is abnormal                 "

## 2023-08-21 ENCOUNTER — NON-PROVIDER VISIT (OUTPATIENT)
Dept: SLEEP MEDICINE | Facility: MEDICAL CENTER | Age: 63
End: 2023-08-21
Attending: BEHAVIOR ANALYST
Payer: MEDICARE

## 2023-08-21 ENCOUNTER — OFFICE VISIT (OUTPATIENT)
Dept: SLEEP MEDICINE | Facility: MEDICAL CENTER | Age: 63
End: 2023-08-21
Attending: FAMILY MEDICINE
Payer: MEDICARE

## 2023-08-21 VITALS
HEART RATE: 70 BPM | BODY MASS INDEX: 23.64 KG/M2 | HEIGHT: 68 IN | DIASTOLIC BLOOD PRESSURE: 62 MMHG | SYSTOLIC BLOOD PRESSURE: 102 MMHG | OXYGEN SATURATION: 97 % | WEIGHT: 156 LBS | RESPIRATION RATE: 16 BRPM

## 2023-08-21 VITALS — BODY MASS INDEX: 23.64 KG/M2 | HEIGHT: 68 IN | WEIGHT: 156 LBS

## 2023-08-21 DIAGNOSIS — Z87.891 PERSONAL HISTORY OF TOBACCO USE, PRESENTING HAZARDS TO HEALTH: ICD-10-CM

## 2023-08-21 DIAGNOSIS — J44.9 CHRONIC OBSTRUCTIVE PULMONARY DISEASE, UNSPECIFIED COPD TYPE (HCC): ICD-10-CM

## 2023-08-21 PROCEDURE — 94060 EVALUATION OF WHEEZING: CPT | Mod: 26 | Performed by: INTERNAL MEDICINE

## 2023-08-21 PROCEDURE — 94729 DIFFUSING CAPACITY: CPT | Performed by: BEHAVIOR ANALYST

## 2023-08-21 PROCEDURE — 3078F DIAST BP <80 MM HG: CPT | Performed by: FAMILY MEDICINE

## 2023-08-21 PROCEDURE — 94729 DIFFUSING CAPACITY: CPT | Mod: 26 | Performed by: INTERNAL MEDICINE

## 2023-08-21 PROCEDURE — 99999 PR NO CHARGE: CPT | Performed by: BEHAVIOR ANALYST

## 2023-08-21 PROCEDURE — 3074F SYST BP LT 130 MM HG: CPT | Performed by: FAMILY MEDICINE

## 2023-08-21 PROCEDURE — 94726 PLETHYSMOGRAPHY LUNG VOLUMES: CPT | Performed by: BEHAVIOR ANALYST

## 2023-08-21 PROCEDURE — 94726 PLETHYSMOGRAPHY LUNG VOLUMES: CPT | Mod: 26 | Performed by: INTERNAL MEDICINE

## 2023-08-21 PROCEDURE — G0296 VISIT TO DETERM LDCT ELIG: HCPCS | Performed by: FAMILY MEDICINE

## 2023-08-21 PROCEDURE — 94060 EVALUATION OF WHEEZING: CPT | Performed by: BEHAVIOR ANALYST

## 2023-08-21 ASSESSMENT — PULMONARY FUNCTION TESTS
FEV1/FVC_PERCENT_PREDICTED: 90
FEV1/FVC: 68
FEV1/FVC_PERCENT_CHANGE: 117
FVC_PREDICTED: 3.44
FEV1/FVC_PERCENT_CHANGE: 2
FEV1_PREDICTED: 2.67
FEV1_PERCENT_CHANGE: 7
FEV1: 2.22
FEV1_LLN: 2.23
FEV1/FVC: 70
FEV1_PERCENT_PREDICTED: 83
FVC_PERCENT_PREDICTED: 85
FEV1: 2.01
FEV1/FVC_PERCENT_LLN: 65
FEV1_PERCENT_PREDICTED: 75
FVC: 2.95
FEV1/FVC_PERCENT_PREDICTED: 89
FEV1_PERCENT_CHANGE: 6
FEV1/FVC_PERCENT_PREDICTED: 78
FEV1/FVC_PERCENT_PREDICTED: 88
FVC: 3.17
FEV1/FVC_PREDICTED: 78
FEV1/FVC_PERCENT_PREDICTED: 87
FEV1/FVC: 70.03
FVC_LLN: 2.87
FVC_PERCENT_PREDICTED: 92
FEV1/FVC: 68

## 2023-08-21 ASSESSMENT — ENCOUNTER SYMPTOMS
SHORTNESS OF BREATH: 0
HEMOPTYSIS: 0
FEVER: 0
COUGH: 1
WEIGHT LOSS: 0
SPUTUM PRODUCTION: 0
WHEEZING: 0
CHILLS: 0
PALPITATIONS: 0

## 2023-08-21 ASSESSMENT — FIBROSIS 4 INDEX
FIB4 SCORE: 1.188670882167041097
FIB4 SCORE: 1.188670882167041097

## 2023-08-21 NOTE — PROCEDURES
Technician: May Johnson RRT, CPFT  Good patient effort & cooperation.  The results of this test meet the ATS/ERS standards for acceptability & reproducibility.  Test was performed on the [employer] Body Plethysmograph-Elite DX system.  Predicted equations for Spirometry are GLI-2012, ITS for lung volumes, and GLI-2017 for DLCO.  The DLCO was uncorrected for Hgb.  A bronchodilator of Ventolin HFA -2puffs via spacer administered.  DLCO performed during dilation period.    Interpretation;    Baseline spirometry shows airflow obstruction with an FEV1/FVC ratio 68 and FEV1 of 2.01 L or 75% predicted.  There is significant bronchodilator response with improvement in FEV1 to 2.22 L or 83% predicted and a postbronchodilator FEV1/FVC ratio of 78.  Lung volumes are within normal limits.  Diffusion capacity is within normal limits.  Pulmonary function testing shows mild airflow obstruction with near complete reversibility following bronchodilator suggesting reactive airways disease or mild obstructive lung disease.  Correlate clinically and with imaging.

## 2023-08-21 NOTE — Clinical Note
Thank you for referring Sita to the Lung Cancer Screening program.  I enrolled her today. I will update you re: abnormal findings. -Dr. Merle Olvera

## 2023-08-28 ENCOUNTER — OFFICE VISIT (OUTPATIENT)
Dept: MEDICAL GROUP | Facility: MEDICAL CENTER | Age: 63
End: 2023-08-28
Payer: MEDICARE

## 2023-08-28 VITALS
DIASTOLIC BLOOD PRESSURE: 70 MMHG | TEMPERATURE: 98 F | HEART RATE: 78 BPM | OXYGEN SATURATION: 95 % | SYSTOLIC BLOOD PRESSURE: 120 MMHG | WEIGHT: 154 LBS | HEIGHT: 68 IN | BODY MASS INDEX: 23.34 KG/M2

## 2023-08-28 DIAGNOSIS — K21.9 GASTROESOPHAGEAL REFLUX DISEASE WITHOUT ESOPHAGITIS: ICD-10-CM

## 2023-08-28 DIAGNOSIS — R73.9 HYPERGLYCEMIA: Chronic | ICD-10-CM

## 2023-08-28 DIAGNOSIS — Z00.00 ENCOUNTER FOR MEDICARE ANNUAL WELLNESS EXAM: ICD-10-CM

## 2023-08-28 DIAGNOSIS — M81.0 AGE-RELATED OSTEOPOROSIS WITHOUT CURRENT PATHOLOGICAL FRACTURE: ICD-10-CM

## 2023-08-28 DIAGNOSIS — E78.5 DYSLIPIDEMIA: Chronic | ICD-10-CM

## 2023-08-28 DIAGNOSIS — F33.1 MODERATE EPISODE OF RECURRENT MAJOR DEPRESSIVE DISORDER (HCC): ICD-10-CM

## 2023-08-28 DIAGNOSIS — J44.9 CHRONIC OBSTRUCTIVE PULMONARY DISEASE, UNSPECIFIED COPD TYPE (HCC): Chronic | ICD-10-CM

## 2023-08-28 PROBLEM — G89.29 CHRONIC PAIN OF LEFT ANKLE: Status: RESOLVED | Noted: 2022-11-22 | Resolved: 2023-08-28

## 2023-08-28 PROBLEM — R22.1 LOCALIZED SWELLING, MASS OR LUMP OF NECK: Chronic | Status: RESOLVED | Noted: 2023-07-26 | Resolved: 2023-08-28

## 2023-08-28 PROBLEM — M25.572 CHRONIC PAIN OF LEFT ANKLE: Status: RESOLVED | Noted: 2022-11-22 | Resolved: 2023-08-28

## 2023-08-28 PROCEDURE — 3074F SYST BP LT 130 MM HG: CPT | Performed by: BEHAVIOR ANALYST

## 2023-08-28 PROCEDURE — G0438 PPPS, INITIAL VISIT: HCPCS | Performed by: BEHAVIOR ANALYST

## 2023-08-28 PROCEDURE — 3078F DIAST BP <80 MM HG: CPT | Performed by: BEHAVIOR ANALYST

## 2023-08-28 RX ORDER — FAMOTIDINE 40 MG/1
40 TABLET, FILM COATED ORAL DAILY
Qty: 60 TABLET | Refills: 1 | Status: SHIPPED | OUTPATIENT
Start: 2023-08-28 | End: 2023-10-19

## 2023-08-28 RX ORDER — SERTRALINE HYDROCHLORIDE 25 MG/1
25 TABLET, FILM COATED ORAL DAILY
Qty: 30 TABLET | Refills: 11 | Status: SHIPPED | OUTPATIENT
Start: 2023-08-28 | End: 2023-09-20

## 2023-08-28 RX ORDER — PANTOPRAZOLE SODIUM 20 MG/1
20 TABLET, DELAYED RELEASE ORAL DAILY
Qty: 30 TABLET | Refills: 0 | Status: SHIPPED | OUTPATIENT
Start: 2023-08-28 | End: 2023-09-25

## 2023-08-28 ASSESSMENT — FIBROSIS 4 INDEX: FIB4 SCORE: 1.188670882167041097

## 2023-08-28 ASSESSMENT — ENCOUNTER SYMPTOMS: GENERAL WELL-BEING: GOOD

## 2023-08-28 ASSESSMENT — ACTIVITIES OF DAILY LIVING (ADL): BATHING_REQUIRES_ASSISTANCE: 0

## 2023-08-28 ASSESSMENT — PATIENT HEALTH QUESTIONNAIRE - PHQ9
SUM OF ALL RESPONSES TO PHQ QUESTIONS 1-9: 6
5. POOR APPETITE OR OVEREATING: 0 - NOT AT ALL
CLINICAL INTERPRETATION OF PHQ2 SCORE: 2

## 2023-08-28 NOTE — PROGRESS NOTES
Chief Complaint   Patient presents with    Annual Exam       HPI:  Sita is a 63 y.o. here for Medicare Annual Wellness Visit    Problem   Moderate Episode of Recurrent Major Depressive Disorder (Hcc)   Hyperglycemia    She drinks one diet coke per day. She admits that she eats too much carbs. Trying to eat salads daily for lunch.     Lab Results   Component Value Date/Time    HBA1C 5.9 (H) 08/09/2023 0843    AVGLUC 123 08/09/2023 0843          Dyslipidemia    She was prescribed atorvastatin by cardiology but has not started. She wanted to wait and see if lifestyle modifications first.   LDL:  140 April 2020;  8/2023     Gerd (Gastroesophageal Reflux Disease)    Taking pantoprazole every morning for past 3-4 years. Last EGD was 2020 and showed reflux esophagitis but no Barretts or other concerns.        Copd (Chronic Obstructive Pulmonary Disease) ([facility])    Continues to use Symbicort, one puff, twice daily. Has not needed to use albuterol often frequently.   Establishing with pulmonologist- Dr. Olvera. Just completed PFT and low-dose lung cancer screening.    Stopped smoking in 2019.        Osteoporosis    She slipped and fell on the ice this past Friday. She fell on her hip and twisted her foot. Broke bone in lateral foot and has large bruise on right hip but thankfully no other fractures.   Continues to take fosamax once weekly. . No side effects with biophosphonate.   Continues to take vitamin D and calcium supplement with WNL vit D in 2022.   Started 4/2021 based on last dexa.   FINDINGS: lumbar spine T score of -4.1; femur T score of -3.3          Localized Swelling, Mass Or Lump of Neck (Resolved)          Chronic Pain of Left Ankle (Resolved)                Patient Active Problem List    Diagnosis Date Noted    Moderate episode of recurrent major depressive disorder (HCC) 08/28/2023    Personal history of tobacco use 07/26/2023    Atherosclerosis of aorta (HCC) 04/25/2023    Hyperglycemia 02/27/2023     Dyslipidemia 03/01/2022    Fluid level behind tympanic membrane of both ears 01/13/2022    Palpitations 01/05/2022    History of thyroid nodule 08/13/2021    GERD (gastroesophageal reflux disease) 05/04/2021    Onychomycosis of toenail 04/14/2021    Liver cyst 01/22/2021    COPD (chronic obstructive pulmonary disease) (HCC) 10/04/2010    Osteoporosis 10/04/2010       Current Outpatient Medications   Medication Sig Dispense Refill    pantoprazole (PROTONIX) 20 MG tablet Take 1 Tablet by mouth every day. 30 Tablet 0    famotidine (PEPCID) 40 MG Tab Take 1 Tablet by mouth every day. 60 Tablet 1    sertraline (ZOLOFT) 25 MG tablet Take 1 Tablet by mouth every day. 30 Tablet 11    SYMBICORT 160-4.5 MCG/ACT Aerosol Inhale 2 Puffs 2 times a day. 10.2 g 6    alendronate (FOSAMAX) 70 MG Tab TAKE 1 TABLET BY MOUTH ONE TIME PER WEEK 12 Tablet 3    albuterol 108 (90 Base) MCG/ACT Aero Soln inhalation aerosol Inhale 1-2 Puffs every four hours as needed for Shortness of Breath. 1 Each 2    Calcium-Vitamin D-Vitamin K (VIACTIV CALCIUM PLUS D) 650-12.5-40 MG-MCG-MCG Chew Tab Chew.       No current facility-administered medications for this visit.        Patient is taking medications as noted in medication list.  Current supplements as per medication list.     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 No, due for TDAP. Patient is interested in receiving NONE today.    She  reports that she quit smoking about 4 years ago. Her smoking use included cigarettes. She started smoking about 34 years ago. She has a 22.5 pack-year smoking history. She has never used smokeless tobacco. She reports that she does not drink alcohol and does not use drugs.  Counseling given: Not Answered      ROS:    Gait: Uses no assistive device   Ostomy: No   Other tubes: No   Amputations: No   Chronic oxygen use No   Last eye exam 01/23   Wears  hearing aids: No   : Denies any urinary leakage during the last 6 months      Depression Screening  Little interest or pleasure in doing things?  2 - more than half the days  Feeling down, depressed, or hopeless? 0 - not at all  Trouble falling or staying asleep, or sleeping too much?  2 - more than half the days  Feeling tired or having little energy?  2 - more than half the days  Poor appetite or overeating?  0 - not at all  Feeling bad about yourself - or that you are a failure or have let yourself or your family down? 0 - not at all  Trouble concentrating on things, such as reading the newspaper or watching television? 0 - not at all  Moving or speaking so slowly that other people could have noticed.  Or the opposite - being so fidgety or restless that you have been moving around a lot more than usual?  0 - not at all  Thoughts that you would be better off dead, or of hurting yourself?  0 - not at all  Patient Health Questionnaire Score: 6    If depressive symptoms identified deferred to follow up visit unless specifically addressed in assessment and plan.    Interpretation of PHQ-9 Total Score   Score Severity   1-4 No Depression   5-9 Mild Depression   10-14 Moderate Depression   15-19 Moderately Severe Depression   20-27 Severe Depression    Screening for Cognitive Impairment  Three Minute Recall (Banana, Sunrise, Chair)  3/3    Draw clock face with all 12 numbers and set the hands to show 20 past 8.  Yes    If cognitive concerns identified, deferred for follow up unless specifically addressed in assessment and plan.    Fall Risk Assessment  Has the patient had two or more falls in the last year or any fall with injury in the last year?  No  If fall risk identified, deferred for follow up unless specifically addressed in assessment and plan.    Safety Assessment  Throw rugs on floor.     Handrails on all stairs.     Good lighting in all hallways.     Difficulty hearing.  No  Patient counseled about all  safety risks that were identified.    Functional Assessment ADLs  Are there any barriers preventing you from cooking for yourself or meeting nutritional needs?  No.    Are there any barriers preventing you from driving safely or obtaining transportation?  No.    Are there any barriers preventing you from using a telephone or calling for help?  No.    Are there any barriers preventing you from shopping?  No.    Are there any barriers preventing you from taking care of your own finances?  No.    Are there any barriers preventing you from managing your medications?  No.    Are there any barriers preventing you from showering, bathing or dressing yourself?  No.    Are you currently engaging in any exercise or physical activity?  Yes.  Exercise class on Tuesday and thursday  What is your perception of your health?  Good.    Advance Care Planning  Do you have an Advance Directive, Living Will, Durable Power of , or POLST? No               Health Maintenance Summary            Overdue - Pneumococcal Vaccine: 65+ Years (1 - PCV) Overdue - never done      No completion history exists for this topic.              Overdue - IMM DTaP/Tdap/Td Vaccine (1 - Tdap) Overdue - never done      No completion history exists for this topic.              Overdue - COVID-19 Vaccine (2 - Pfizer series) Overdue since 4/12/2023      10/26/2022  Imm Admin: MODERNA BIVALENT BOOSTER SARS-COV-2 VACCINE (6+)    12/18/2021  Imm Admin: MODERNA SARS-COV-2 VACCINE (12+)    12/18/2021  Imm Admin: MODERNA SARS-COV-2 VACCINE (12+)    03/16/2021  Imm Admin: MODERNA SARS-COV-2 VACCINE (12+)    02/15/2021  Imm Admin: MODERNA SARS-COV-2 VACCINE (12+)              Influenza Vaccine (1) Next due on 9/1/2023      10/26/2022  Imm Admin: Influenza Vac Subunit Quad Inj (Pf)    09/13/2021  Imm Admin: Influenza Seasonal Injectable - Historical Data    10/21/2020  Imm Admin: Influenza Vaccine Quad Inj (Pf)    10/21/2020  Imm Admin: Influenza (IM) Preservative  Free - HISTORICAL DATA    2020  Imm Admin: Influenza Vac Subunit Quad Inj (Pf)    Only the first 5 history entries have been loaded, but more history exists.              Scheduled - LUNG CANCER SCREENING (Yearly) Scheduled for 2022  CT-CTA CHEST PULMONARY ARTERY W/ RECONS    11/15/2021  Outside Procedure: [place] LOW DOSE CT LUNG CA SCREEN W/O CONT    2020  CT-LUNG CANCER-SCREENING              Cervical Cancer Screening (Every 3 Years) Tentatively due on 2021  THINPREP PAP WITH HPV    2021  Pathology Gynecology Specimen              Annual Pulmonary Function Test / Spirometry (Yearly) Next due on 2023  PULMONARY FUNCTION TESTS -Test requested: Complete Pulmonary Function Test              Annual Wellness Visit (Every 366 Days) Next due on 2023  Visit Dx: Encounter for Medicare annual wellness exam    2023  Level of Service: [place] ANNUAL WELLNESS VISIT-INCLUDES PPPS SUBSEQUE*              Mammogram (Every 2 Years) Next due on 2023  MA-SCREENING MAMMO BILAT W/TOMOSYNTHESIS W/CAD    2021  MA-SCREENING MAMMO BILAT W/TOMOSYNTHESIS W/CAD    2008  MA-CAD DIAGNOSTIC-MAMMO    2008  MA-DIAGNOSTIC DIGITAL MAMMO-BILAT    2007  MA-DIAGNOSTIC DIGITAL MAMMO-UNILAT    Only the first 5 history entries have been loaded, but more history exists.              Bone Density Scan (Every 5 Years) Next due on 2023  DS-BONE DENSITY STUDY (DEXA)    2021  DS-BONE DENSITY STUDY (DEXA)    2008  DS-BONE DENSITY STUDY (DEXA)    2005  DS-BONE DENSITY STUDY (DEXA)              Colorectal Cancer Screening (Colonoscopy - Every 10 Years) Tentatively due on 2020  REFERRAL TO GI FOR COLONOSCOPY              Hepatitis C Screening  Tentatively Complete      2021  Hepatitis C Antibody component of HCV Scrn ( 7454-2128 1xLife)               Hepatitis A Vaccine (Hep A) (Series Information) Aged Out      No completion history exists for this topic.              Hepatitis B Vaccine (Hep B) (Series Information) Aged Out      No completion history exists for this topic.              HPV Vaccines (Series Information) Aged Out      No completion history exists for this topic.              Polio Vaccine (Inactivated Polio) (Series Information) Aged Out      No completion history exists for this topic.              IMM MENINGOCOCCAL ACWY VACCINE (Series Information) Aged Out      No completion history exists for this topic.              Discontinued - Zoster (Shingles) Vaccines  Discontinued      2021  Imm Admin: Zoster Vaccine Recombinant (RZV) (SHINGRIX)    2021  Imm Admin: Zoster Vaccine Recombinant (RZV) (SHINGRIX)                    Patient Care Team:  GIOVANNI Donaldson as PCP - General (Nurse Practitioner Family)    Social History     Tobacco Use    Smoking status: Former     Current packs/day: 0.00     Average packs/day: 0.8 packs/day for 30.0 years (22.5 ttl pk-yrs)     Types: Cigarettes     Start date: 1989     Quit date: 2019     Years since quittin.5    Smokeless tobacco: Never   Vaping Use    Vaping Use: Never used   Substance Use Topics    Alcohol use: Never    Drug use: No     Family History   Problem Relation Age of Onset    Cancer Mother         breast    Allergies Mother     Diabetes Father     Cancer Brother         esophageal cancer     She  has a past medical history of ASTHMA, COPD (chronic obstructive pulmonary disease) ([facility]) (10/4/2010), COPD (chronic obstructive pulmonary disease) (HCC) (10/4/2010), COVID-19 virus infection (1/3/2023), Eczema (10/4/2010), Moderate episode of recurrent major depressive disorder (HCC) (2023), Onychomycosis of toenail (2021), Osteopenia (10/4/2010), and Recurrent acute sinusitis.    She has no past medical history of CAD (coronary artery disease), Cancer  "(HCC), Congestive heart failure (HCC), Diabetes, Hypertension, Liver disease, Renal disorder, Seizure disorder (HCC), or Stroke (HCC).   History reviewed. No pertinent surgical history.    Exam:   /70 (BP Location: Left arm, Patient Position: Sitting, BP Cuff Size: Adult long)   Pulse 78   Temp 36.7 °C (98 °F) (Temporal)   Ht 1.727 m (5' 8\")   Wt 69.9 kg (154 lb)   SpO2 95%  Body mass index is 23.42 kg/m².    Hearing excellent.    Dentition good. Sees the dentist regularly  Alert, oriented in no acute distress  Eye contact is good, speech goal directed, affect calm but tearful      Assessment and Plan. The following treatment and monitoring plan is recommended:        1. Chronic obstructive pulmonary disease, unspecified COPD type ([facility])  -Chronic, controlled.  She will be establishing with pulmonary.  -Review reviewed her recent pulmonary function test results.  -Continue Symbicort and as needed albuterol.    2. Age-related osteoporosis without current pathological fracture  -Chronic, controlled with alendronate and supplemental calcium and vitamin D.  -Continue weight bearing and resistance training exercises (30 minutes on most days of the week).    3. Gastroesophageal reflux disease without esophagitis  -Chronic problem has been using chronic PPI.  -Reviewed the potential long term adverse effects of PPI use.  We will start by tapering pantoprazole from 40 mg to 20 mg daily over 2 to 3 weeks.  She should then take the 20 mg every other day and continue to decrease.  She will then start famotidine.   -Continue to avoid triggers of acid reflux.  - pantoprazole (PROTONIX) 20 MG tablet; Take 1 Tablet by mouth every day.  Dispense: 30 Tablet; Refill: 0  - famotidine (PEPCID) 40 MG Tab; Take 1 Tablet by mouth every day.  Dispense: 60 Tablet; Refill: 1    4. Hyperglycemia  -Chronic, stable.  -Counseled patient regarding diet and lifestyle modifications.  Needs to reduce carb intake.    5. Dyslipidemia  - - " Chronic, elevation in LDL. not desiring to start statin.  The 10-year ASCVD risk score (Isabell YANCEY, et al., 2019) is: 5.5%  -Completed diet and exercise modification counseling.     6. Moderate episode of recurrent major depressive disorder (HCC)  -New problem.  At the end of the visit she mentions that she has been crying easily feeling depressed.  She feels overwhelmed regarding her amount of work and reports bad relationship with her .  - - Counseled on importance of regular exercise, healthy diet, good sleep hygiene, and positive social interaction.   -Had thorough discussion with patient regarding the potential side effects of SSRIs.  Counseled patient on how we will titrate the sertraline reiterated to patient that it can take 4 weeks to see initial effect and up to 8 weeks to see full therapeutic effect of the medication.   - sertraline (ZOLOFT) 25 MG tablet; Take 1 Tablet by mouth every day.  Dispense: 30 Tablet; Refill: 11    7. Encounter for Medicare annual wellness exam  Services suggested: No services needed at this time  Health Care Screening recommendations as per orders if indicated.  Referrals offered: PT/OT/Nutrition counseling/Behavioral Health/Smoking cessation as per orders if indicated.    Discussion today about general wellness and lifestyle habits:    Prevent falls and reduce trip hazards; Cautioned about securing or removing rugs.  Have a working fire alarm and carbon monoxide detector;   Engage in regular physical activity and social activities.     Follow-up: Return in about 6 weeks (around 10/9/2023) for med check.

## 2023-08-30 ENCOUNTER — HOSPITAL ENCOUNTER (OUTPATIENT)
Dept: RADIOLOGY | Facility: MEDICAL CENTER | Age: 63
End: 2023-08-30
Attending: FAMILY MEDICINE
Payer: MEDICARE

## 2023-08-30 DIAGNOSIS — Z87.891 PERSONAL HISTORY OF TOBACCO USE, PRESENTING HAZARDS TO HEALTH: ICD-10-CM

## 2023-08-30 PROCEDURE — 71271 CT THORAX LUNG CANCER SCR C-: CPT

## 2023-09-05 ENCOUNTER — TELEPHONE (OUTPATIENT)
Dept: SLEEP MEDICINE | Facility: MEDICAL CENTER | Age: 63
End: 2023-09-05
Payer: MEDICARE

## 2023-09-05 NOTE — TELEPHONE ENCOUNTER
Phoned patient with results of LDCT exam performed 8/30/23.    Notified her that the results showed no suspicious lung [id number]  Recommend follow up CT in 12 months.    Informed patient of incidental findings of thyroid nodule with recommendation to follow up with PCP.    Patient agrees to all recommendations.    Referring provider DOUGLAS Loyola, notified of results and incidental findings via this communication.    Health maintenance updated and patient sent lung cancer screening result letter.        CT-LUNG CANCER-SCREENING    Result Date: 8/30/2023 8/30/2023 2:18 PM HISTORY/REASON FOR EXAM:  Lung Cancer Screening Total pack years: 22.50 TECHNIQUE/EXAM DESCRIPTION AND NUMBER OF VIEWS: Lung cancer screening without contrast. Low dose noncontrast helical images were obtained of the chest from the lung apices through the costophrenic sulci utilizing thin collimation and intervals with reconstructed images sent to PACS in axial, coronal and sagittal planes. Low dose optimization technique was utilized for this CT exam including automated exposure control and adjustment of the mA and/or kV according to patient size. COMPARISON: None. FINDINGS: Lower neck: A 3.0 cm nodule in the right thyroid gland. Lung nodule: No suspicious pulmonary nodule Lungs:  Bilateral lungs are clear. No airspace opacity. Emphysema: Mild Fibrosis: None Airway: Patent Pleura: No pleural effusion or pneumothorax. Thoracic aorta and great vessels:  No aneurysm. Heart and pericardium:   Mild coronary artery calcification. No pericardial effusion. Lymph nodes: No enlarged mediastinal or hilar lymph nodes. Thoracic spine:  No fracture or malalignment. No compression deformity. Chest wall:   Unremarkable. Visualized abdomen: A 10.1 cm hepatic cyst. ___________________________________     1. No suspicious pulmonary nodules. 2.  No airspace opacity. No pleural effusion. No pneumothorax. 3. A 3.0 cm nodule in the right thyroid gland.  Correlate with thyroid ultrasound. Lung RADS: 1 - Negative: No nodules and definitely benign nodules Findings: no lung nodules nodule(s) with specific calcifications: complete, central, popcorn, concentric rings and fat containing nodules Management: Continue annual screening with LDCT in 12 months    US-THYROID    Addendum Date: 8/10/2023    There is a laterality error in the report. The corrected report should state: Nonenlarged lymph nodes in the RIGHT neck at the area of pain.    Result Date: 8/10/2023  8/9/2023 5:20 PM HISTORY/REASON FOR EXAM:  Head/neck pain; history of thyroid nodules and biopsy now with new pain in right side of neck. TECHNIQUE/EXAM DESCRIPTION: Ultrasound of the soft tissues of the head and neck. COMPARISON:  5/20/2022 ultrasound of the soft tissues of the head and neck. FINDINGS: The thyroid gland is heterogeneous. Vascularity is normal. The right lobe of the thyroid gland measures 2.23 cm x 5.48 cm x 2.71 cm. The left lobe of the thyroid gland measures 1.36 cm x 4.16 cm x 1.47 cm. The isthmus measures 0.40 cm. Nodules >= 1cm: Nodule #1 Location:  Right  lower Size:  4.25 x 2.74 x 2.26 cm, previously 3.8 x 2.8 x 2.1 cm Composition:  Solid-2 Echogenicity:  Isoechoic-1 Shape:  Wider than tall-0 Margins:  Smooth-0 Echogenic Foci:  Macroscopic-1 ACR TIRADS points/category:  4 - TR4 - Moderately Suspicious Nonenlarged lymph nodes in the left neck at the area of pain.     Grossly similar in appearance of the right thyroid nodule. The measurement difference could be technical. Nonenlarged lymph nodes in the left neck at the area of pain. Recommendations based on the American College of Radiology Thyroid imaging, reporting and Data System (TI-RADS) 2017. INTERPRETING LOCATION:  [facility] [place], [id number]

## 2023-09-05 NOTE — LETTER
"   [facility]  [address]  [facility]  [address]  [place]  [address]  P [phone]  F [phone]  [place] [address]  P [phone]                                    F [phone]  Date: September 5, 2023    Sita Corneliuserinyesica Medina  [address]  [place] [address]    Re:  Low-dose chest CT performed on 8/30/23    Dear Sita,    We are pleased to let you know that the results of your recent low-dose chest CT (LDCT) examination were negative, or showed no evidence of lung nodule or mass.  The radiologist recommends to continue annual screening with LDCT in 12 months.  In the event that any additional \"incidental\" findings were identified from this exam, we have communicated back to your primary care provider for follow-up.    Here are some other important points you should know:  Your low-dose Chest CT report has been sent to your referring or primary health care provider and is available to participants in TouchMail.  As a part of our Lung Cancer Screening program we will remind you and your referring health care provider when your next LDCT screening is due.  Although low-dose chest CT is very effective at finding lung cancer early, it cannot find all lung cancers. If you develop any new symptoms such as shortness of breath, chest pain, or coughing up blood, please call your doctor.  Please keep in mind that good health involves quitting smoking (for help, call [facility] Quit Tobacco program at [phone], an annual physical exam, and continued screening with low-dose chest CT.    Thank you for participating in the Lung Cancer Screening program. If you have any questions about this letter or our program, please call our Physician  at [phone]    Sincerely,  Dr. Merle Olvera  [facility] Lung Cancer Screening Program     "

## 2023-09-21 NOTE — ED NOTES
Patient discharged in stable condition per orders. IV access removed - bandage applied. Wristband removed per protocol. Patient verbalized understanding of all discharge instructions. All belongings accounted for. Ambulatory to lobby with steady gait accompanied by family.   Detail Level: Zone Anticipated Starting Dosage (Optional): 20mg Daily Patient Reported Weight (Optional - Include Units): 120

## 2023-09-24 DIAGNOSIS — K21.9 GASTROESOPHAGEAL REFLUX DISEASE WITHOUT ESOPHAGITIS: ICD-10-CM

## 2023-09-25 RX ORDER — PANTOPRAZOLE SODIUM 20 MG/1
20 TABLET, DELAYED RELEASE ORAL DAILY
Qty: 30 TABLET | Refills: 0 | Status: SHIPPED | OUTPATIENT
Start: 2023-09-25 | End: 2023-12-22

## 2023-10-19 DIAGNOSIS — K21.9 GASTROESOPHAGEAL REFLUX DISEASE WITHOUT ESOPHAGITIS: ICD-10-CM

## 2023-10-19 RX ORDER — FAMOTIDINE 40 MG/1
40 TABLET, FILM COATED ORAL DAILY
Qty: 90 TABLET | Refills: 2 | Status: SHIPPED | OUTPATIENT
Start: 2023-10-19

## 2023-10-24 ASSESSMENT — ENCOUNTER SYMPTOMS
SHORTNESS OF BREATH: 1
CHEST TIGHTNESS: 0
DYSPNEA AT REST: 0
WHEEZING: 1
HEMOPTYSIS: 0
RESPIRATORY SYMPTOMS COMMENTS: A FEW TIMES

## 2023-10-25 ENCOUNTER — OFFICE VISIT (OUTPATIENT)
Dept: SLEEP MEDICINE | Facility: MEDICAL CENTER | Age: 63
End: 2023-10-25
Attending: BEHAVIOR ANALYST
Payer: MEDICARE

## 2023-10-25 VITALS
WEIGHT: 153 LBS | DIASTOLIC BLOOD PRESSURE: 68 MMHG | HEIGHT: 68 IN | OXYGEN SATURATION: 95 % | BODY MASS INDEX: 23.19 KG/M2 | SYSTOLIC BLOOD PRESSURE: 112 MMHG | HEART RATE: 81 BPM

## 2023-10-25 DIAGNOSIS — Z87.891 PERSONAL HISTORY OF TOBACCO USE: ICD-10-CM

## 2023-10-25 DIAGNOSIS — J44.89 ASTHMA-COPD OVERLAP SYNDROME (HCC): ICD-10-CM

## 2023-10-25 PROCEDURE — 3078F DIAST BP <80 MM HG: CPT | Performed by: INTERNAL MEDICINE

## 2023-10-25 PROCEDURE — 99211 OFF/OP EST MAY X REQ PHY/QHP: CPT | Performed by: INTERNAL MEDICINE

## 2023-10-25 PROCEDURE — 3074F SYST BP LT 130 MM HG: CPT | Performed by: INTERNAL MEDICINE

## 2023-10-25 PROCEDURE — 99205 OFFICE O/P NEW HI 60 MIN: CPT | Performed by: INTERNAL MEDICINE

## 2023-10-25 RX ORDER — MONTELUKAST SODIUM 10 MG/1
10 TABLET ORAL
Qty: 30 TABLET | Refills: 11 | Status: SHIPPED | OUTPATIENT
Start: 2023-10-25 | End: 2023-11-17

## 2023-10-25 RX ORDER — TIOTROPIUM BROMIDE 18 UG/1
18 CAPSULE ORAL; RESPIRATORY (INHALATION) DAILY
Qty: 30 CAPSULE | Refills: 11 | Status: SHIPPED | OUTPATIENT
Start: 2023-10-25 | End: 2023-10-25

## 2023-10-25 ASSESSMENT — ENCOUNTER SYMPTOMS
WHEEZING: 1
HEMOPTYSIS: 0
COUGH: 0
SHORTNESS OF BREATH: 1

## 2023-10-25 ASSESSMENT — FIBROSIS 4 INDEX: FIB4 SCORE: 1.188670882167041097

## 2023-10-25 NOTE — PROGRESS NOTES
Pulmonary Clinic- Initial Consult    Date of Service: 10/25/2023    Referring Physician: Day Buchanan, *    Reason for Consult: COPD    Chief Complaint:   Chief Complaint   Patient presents with    Establish Care     Referred by MARISA Donaldson for COPD        HPI:   Sita Medina is a very pleasant 63 y.o. female former tobacco smoker 22 pack years, quit ~2019 who is referred to the pulmonary clinic to establish care for COPD.  She previously followed with Dr. Crowley at [place].    PFTs 8/2023 FEV1 2.01 L, 75% predicted, partial BD response mid flow rates, preserved lung volumes and diffusion capacity    CT chest 8/2023 mild emphysema, increased AP diameter, no nodules    --Symbicort 160 however only doing 1 puff twice daily rather than prescribed 2 puffs  --Albuterol as needed    Functionally, Sita has reported worsening shortness of breath since melita COVID in 12/2022.  She can walk about a half a mile before feeling short of breath.  She is easily dyspneic when walking up hills or stairs.  She has been requiring her albuterol inhaler more frequently on a nearly daily basis.    Exacerbations within the past 12 months: 0    Past Medical History:   Diagnosis Date    ASTHMA     COPD (chronic obstructive pulmonary disease) ([facility]) 10/04/2010    COPD (chronic obstructive pulmonary disease) ([facility]) 10/04/2010    COVID-19 virus infection 01/03/2023    Went to ER on 12/25 for SOB and she thought was an asthma attack. Work up was unremarkable except for positive for COVID infection. She was given nebulizer treatment which was very helpful and sent home with Paxlovid which she took as prescribed until the 31st with no side effects. Also prescibed 40mg prednisone which she took for 2 days total and did not take 3rd dose due to feeling well.  She fe    Eczema 10/04/2010    Moderate episode of recurrent major depressive disorder ([facility]) 08/28/2023    Onychomycosis of toenail 04/14/2021    Osteopenia  10/04/2010    Recurrent acute sinusitis     Shortness of breath     Wheezing      No past surgical history on file.    Social History     Socioeconomic History    Marital status:      Spouse name: Not on file    Number of children: Not on file    Years of education: Not on file    Highest education level: Some college, no degree   Occupational History    Not on file   Tobacco Use    Smoking status: Former     Current packs/day: 0.00     Average packs/day: 0.8 packs/day for 30.0 years (22.5 ttl pk-yrs)     Types: Cigarettes     Start date: 1989     Quit date: 2019     Years since quittin.6    Smokeless tobacco: Never   Vaping Use    Vaping Use: Never used   Substance and Sexual Activity    Alcohol use: Never    Drug use: No    Sexual activity: Yes     Partners: Male   Other Topics Concern    Not on file   Social History Narrative    Not on file     Social Determinants of Health     Financial Resource Strain: Low Risk  (2022)    Overall Financial Resource Strain (CARDIA)     Difficulty of Paying Living Expenses: Not hard at all   Food Insecurity: No Food Insecurity (2022)    Hunger Vital Sign     Worried About Running Out of Food in the Last Year: Never true     Ran Out of Food in the Last Year: Never true   Transportation Needs: No Transportation Needs (2022)    PRAPARE - Transportation     Lack of Transportation (Medical): No     Lack of Transportation (Non-Medical): No   Physical Activity: Sufficiently Active (2022)    Exercise Vital Sign     Days of Exercise per Week: 3 days     Minutes of Exercise per Session: 60 min   Stress: No Stress Concern Present (2022)    [employer] - Occupational Stress Questionnaire     Feeling of Stress : Not at all   Social Connections: Moderately Integrated (2022)    Social Connection and Isolation Panel [NHANES]     Frequency of Communication with Friends and Family: More than three times a week     " Frequency of Social Gatherings with Friends and Family: Three times a week     Attends [employer]ology Services: Never     Active Member of Clubs or Organizations: Yes     Attends Club or Organization Meetings: More than 4 times per year     Marital Status:    Intimate Partner Violence: Not on file   Housing Stability: Unknown (11/19/2022)    Housing Stability Vital Sign     Unable to Pay for Housing in the Last Year: Patient refused     Number of Places Lived in the Last Year: Not on file     Unstable Housing in the Last Year: Patient refused          Family History   Problem Relation Age of Onset    Cancer Mother         breast    Allergies Mother     Diabetes Father     Cancer Brother         esophageal cancer       Current Outpatient Medications on File Prior to Visit   Medication Sig Dispense Refill    famotidine (PEPCID) 40 MG Tab TAKE 1 TABLET BY MOUTH EVERY DAY 90 Tablet 2    pantoprazole (PROTONIX) 20 MG tablet TAKE 1 TABLET BY MOUTH EVERY DAY 30 Tablet 0    sertraline (ZOLOFT) 25 MG tablet TAKE 1 TABLET BY MOUTH EVERY DAY 30 Tablet 1    alendronate (FOSAMAX) 70 MG Tab TAKE 1 TABLET BY MOUTH ONE TIME PER WEEK 12 Tablet 3    albuterol 108 (90 Base) MCG/ACT Aero Soln inhalation aerosol Inhale 1-2 Puffs every four hours as needed for Shortness of Breath. 1 Each 2    Calcium-Vitamin D-Vitamin K (VIACTIV CALCIUM PLUS D) 650-12.5-40 MG-MCG-MCG Chew Tab Chew.      SYMBICORT 160-4.5 MCG/ACT Aerosol Inhale 2 Puffs 2 times a day. (Patient not taking: Reported on 10/25/2023) 10.2 g 6     No current facility-administered medications on file prior to visit.     Allergies: Cefzil [cefprozil]    ROS:   Review of Systems   Respiratory:  Positive for shortness of breath and wheezing. Negative for cough and hemoptysis.    All other systems reviewed and are negative.    Vitals:  /68 (BP Location: Left arm, Patient Position: Sitting, BP Cuff Size: Adult)   Pulse 81   Ht 1.727 m (5' 8\")   Wt 69.4 kg (153 lb)   SpO2 " 95%     Physical Exam:  Physical Exam  Vitals and nursing note reviewed.   Constitutional:       General: She is not in acute distress.     Appearance: Normal appearance. She is well-developed. She is not diaphoretic.      Comments: Very pleasant   Eyes:      General: No scleral icterus.        Right eye: No discharge.         Left eye: No discharge.      Conjunctiva/sclera: Conjunctivae normal.      Pupils: Pupils are equal, round, and reactive to light.   Neck:      Thyroid: No thyromegaly.      Vascular: No JVD.   Cardiovascular:      Rate and Rhythm: Normal rate and regular rhythm.      Heart sounds: Normal heart sounds. No murmur heard.     No gallop.   Pulmonary:      Effort: Pulmonary effort is normal. No respiratory distress.      Breath sounds: Wheezing (end expiratory) present. No rales.   Abdominal:      General: There is no distension.      Palpations: Abdomen is soft.      Tenderness: There is no abdominal tenderness. There is no guarding.   Musculoskeletal:         General: No tenderness.   Lymphadenopathy:      Cervical: No cervical adenopathy.   Skin:     General: Skin is warm.      Capillary Refill: Capillary refill takes less than 2 seconds.      Findings: No erythema or rash.   Neurological:      Mental Status: She is alert and oriented to person, place, and time.      Cranial Nerves: No cranial nerve deficit.      Sensory: No sensory deficit.      Motor: No abnormal muscle tone.   Psychiatric:         Behavior: Behavior normal.       Laboratory Data:    PFTs as reviewed by me personally show:  See HPI    Imaging as reviewed by me personally show:    See HPI    Assessment/Plan:    Problem List Items Addressed This Visit       Asthma-COPD overlap syndrome     PFTs 2023 moderate obstruction, significant decline in FEV1 from 2.86 L in 2011 to 2.01 L or 75% predicted. + Bronchodilator response.   Mild emphysema noted on CT chest 8/2023  Strong history of allergies and rhinosinusitis  mMRC 2, easily  dyspneic when walking up hills/stairs    -- Using Symbicort 160 with spacer however only 1 puff twice daily instructed to increase to 2 puffs twice daily as prescribed, rinse mouth after use  -- Add singulair  -- Cont sinus rinses  -- Consider allergy testing next visit (CBC with diff, immunocap/IgE) if symptoms persist  -- UTD with flu shot, declined pna vaccination, advised to wait on COVID vacc 1-2 months after flu vacc         Relevant Medications    montelukast (SINGULAIR) 10 MG Tab    Personal history of tobacco use     Cont annual LDCT through screening program          Return in about 3 months (around 1/25/2024) for APRN.     This note was generated using voice recognition software which has a chance of producing errors of grammar and possibly content.  I have made every reasonable attempt to find and correct any obvious errors, but it should be expected that some may not be found prior to finalization of this note.    Time spent in record review prior to patient arrival, reviewing results, and in face-to-face encounter totaled 62 min, excluding any procedures if performed.  __________  Kameron Olvera MD  Pulmonary and Critical Care Medicine  [facility]

## 2023-10-25 NOTE — ASSESSMENT & PLAN NOTE
PFTs 2023 moderate obstruction, significant decline in FEV1 from 2.86 L in 2011 to 2.01 L or 75% predicted. + Bronchodilator response.   Mild emphysema noted on CT chest 8/2023  Strong history of allergies and rhinosinusitis  mMRC 2, easily dyspneic when walking up hills/stairs    -- Using Symbicort 160 with spacer however only 1 puff twice daily instructed to increase to 2 puffs twice daily as prescribed, rinse mouth after use  -- Add singulair  -- Cont sinus rinses  -- Consider allergy testing next visit (CBC with diff, immunocap/IgE) if symptoms persist  -- UTD with flu shot, declined pna vaccination, advised to wait on COVID vacc 1-2 months after flu vacc

## 2023-11-17 DIAGNOSIS — J44.89 ASTHMA-COPD OVERLAP SYNDROME (HCC): ICD-10-CM

## 2023-11-17 RX ORDER — MONTELUKAST SODIUM 10 MG/1
10 TABLET ORAL
Qty: 90 TABLET | Refills: 4 | Status: SHIPPED | OUTPATIENT
Start: 2023-11-17 | End: 2024-01-18

## 2023-11-17 NOTE — TELEPHONE ENCOUNTER
90 day supply request.   Have we ever prescribed this med? Yes.  If yes, what date? 10/25/23    Last OV: 10/25/23 with Dr Acosta    Next OV: 01/25/24 with Chano COLE     DX: : montelukast (SINGULAIR) 10 MG Tab     Medications:   Requested Prescriptions     Pending Prescriptions Disp Refills    montelukast (SINGULAIR) 10 MG Tab [Pharmacy Med Name: MONTELUKAST SOD 10 MG TABLET] 90 Tablet 4     Sig: TAKE 1 TABLET BY MOUTH AT BEDTIME

## 2023-11-18 ENCOUNTER — OFFICE VISIT (OUTPATIENT)
Dept: URGENT CARE | Facility: CLINIC | Age: 63
End: 2023-11-18
Payer: MEDICARE

## 2023-11-18 VITALS
OXYGEN SATURATION: 95 % | DIASTOLIC BLOOD PRESSURE: 80 MMHG | HEART RATE: 65 BPM | WEIGHT: 152 LBS | SYSTOLIC BLOOD PRESSURE: 130 MMHG | RESPIRATION RATE: 16 BRPM | TEMPERATURE: 97 F | HEIGHT: 68 IN | BODY MASS INDEX: 23.04 KG/M2

## 2023-11-18 DIAGNOSIS — J45.21 MILD INTERMITTENT ASTHMA WITH EXACERBATION: ICD-10-CM

## 2023-11-18 DIAGNOSIS — J01.00 SUBACUTE MAXILLARY SINUSITIS: ICD-10-CM

## 2023-11-18 PROCEDURE — 3079F DIAST BP 80-89 MM HG: CPT

## 2023-11-18 PROCEDURE — 99213 OFFICE O/P EST LOW 20 MIN: CPT

## 2023-11-18 PROCEDURE — 3075F SYST BP GE 130 - 139MM HG: CPT

## 2023-11-18 RX ORDER — PREDNISONE 20 MG/1
20 TABLET ORAL 2 TIMES DAILY
Qty: 10 TABLET | Refills: 0 | Status: SHIPPED | OUTPATIENT
Start: 2023-11-18 | End: 2023-11-23

## 2023-11-18 RX ORDER — AMOXICILLIN AND CLAVULANATE POTASSIUM 875; 125 MG/1; MG/1
1 TABLET, FILM COATED ORAL 2 TIMES DAILY
Qty: 20 TABLET | Refills: 0 | Status: SHIPPED | OUTPATIENT
Start: 2023-11-18 | End: 2023-11-28

## 2023-11-18 ASSESSMENT — ENCOUNTER SYMPTOMS
CHILLS: 0
SINUS PAIN: 1
WHEEZING: 0
FEVER: 0
SHORTNESS OF BREATH: 0
SORE THROAT: 0

## 2023-11-18 ASSESSMENT — FIBROSIS 4 INDEX: FIB4 SCORE: 1.188670882167041097

## 2023-11-18 NOTE — PROGRESS NOTES
CHIEF COMPLAINT  Chief Complaint   Patient presents with    Cough     Sx x 10 days ago     Sinus Problem     Subjective:   Sita Medina is a 63 y.o. female who presents for Cough (Sx x 10 days ago ) and Sinus Problem  Patient presents to urgent care with complaints of cough and sinus pressure x10 days.  She reports a brown/green sinus drainage and productive cough.  She denies any shortness of breath or chest pain.  She denies any fevers.  She denies any nausea, vomiting or diarrhea  She does report history of asthma.    Review of Systems   Constitutional:  Negative for chills and fever.   HENT:  Positive for sinus pain. Negative for sore throat.    Respiratory:  Negative for shortness of breath and wheezing.    Cardiovascular:  Negative for chest pain.       PAST MEDICAL HISTORY  Patient Active Problem List    Diagnosis Date Noted    Moderate episode of recurrent major depressive disorder (HCC) 08/28/2023    Personal history of tobacco use 07/26/2023    Atherosclerosis of aorta (HCC) 04/25/2023    Hyperglycemia 02/27/2023    Dyslipidemia 03/01/2022    Fluid level behind tympanic membrane of both ears 01/13/2022    Palpitations 01/05/2022    History of thyroid nodule 08/13/2021    GERD (gastroesophageal reflux disease) 05/04/2021    Onychomycosis of toenail 04/14/2021    Liver cyst 01/22/2021    Asthma-COPD overlap syndrome 10/04/2010    Osteoporosis 10/04/2010       SURGICAL HISTORY  patient denies any surgical history    ALLERGIES  Allergies   Allergen Reactions    Cefzil [Cefprozil]      nausea       CURRENT MEDICATIONS  Home Medications       Reviewed by Hugh Grijalva Ass't (Medical Assistant) on 11/18/23 at 1459  Med List Status: <None>     Medication Last Dose Status   albuterol 108 (90 Base) MCG/ACT Aero Soln inhalation aerosol Taking Active   alendronate (FOSAMAX) 70 MG Tab Taking Active   Calcium-Vitamin D-Vitamin K (VIACTIV CALCIUM PLUS D) 650-12.5-40 MG-MCG-MCG Chew Tab Taking Active  "  famotidine (PEPCID) 40 MG Tab Taking Active   montelukast (SINGULAIR) 10 MG Tab PRN Active   pantoprazole (PROTONIX) 20 MG tablet  Active   sertraline (ZOLOFT) 25 MG tablet Not Taking Active   SYMBICORT 160-4.5 MCG/ACT Aerosol Not Taking Active                    SOCIAL HISTORY  Social History     Tobacco Use    Smoking status: Former     Current packs/day: 0.00     Average packs/day: 0.8 packs/day for 30.0 years (22.5 ttl pk-yrs)     Types: Cigarettes     Start date: 1989     Quit date: 2019     Years since quittin.7    Smokeless tobacco: Never   Vaping Use    Vaping Use: Never used   Substance and Sexual Activity    Alcohol use: Never    Drug use: No    Sexual activity: Yes     Partners: Male       FAMILY HISTORY  Family History   Problem Relation Age of Onset    Cancer Mother         breast    Allergies Mother     Diabetes Father     Cancer Brother         esophageal cancer         Medications, Allergies, and current problem list reviewed today in Epic.     Objective:     /80 (BP Location: Left arm, Patient Position: Sitting, BP Cuff Size: Adult)   Pulse 65   Temp 36.1 °C (97 °F) (Temporal)   Resp 16   Ht 1.727 m (5' 8\")   Wt 68.9 kg (152 lb)   SpO2 95%     Physical Exam  Vitals reviewed.   Constitutional:       General: She is not in acute distress.     Appearance: Normal appearance. She is not ill-appearing or toxic-appearing.   HENT:      Head: Normocephalic.      Right Ear: Tympanic membrane normal.      Left Ear: Tympanic membrane normal.      Nose: Congestion and rhinorrhea present.      Mouth/Throat:      Mouth: Mucous membranes are moist.      Pharynx: No oropharyngeal exudate or posterior oropharyngeal erythema.   Cardiovascular:      Rate and Rhythm: Normal rate and regular rhythm.      Pulses: Normal pulses.      Heart sounds: Normal heart sounds.   Pulmonary:      Effort: Pulmonary effort is normal. No respiratory distress.      Breath sounds: Wheezing present. No rhonchi " or rales.   Lymphadenopathy:      Cervical: No cervical adenopathy.   Skin:     Capillary Refill: Capillary refill takes less than 2 seconds.   Neurological:      General: No focal deficit present.      Mental Status: She is alert.   Psychiatric:         Mood and Affect: Mood normal.         Behavior: Behavior normal.         Assessment/Plan:     Diagnosis and associated orders:     1. Subacute maxillary sinusitis  amoxicillin-clavulanate (AUGMENTIN) 875-125 MG Tab      2. Mild intermittent asthma with exacerbation  predniSONE (DELTASONE) 20 MG Tab         Comments/MDM:     Patient presents to urgent care with complaints of sinus pressure and cough x10 days.  She denies any fevers.  Does report a brown-green productive sputum.  Patient has bilateral wheezes with auscultation.  No crackles or rhonchi appreciated.  Diminished airflow throughout.  Equal chest expansion.  Mild pharyngeal erythema.  Purulent oral pharyngeal drainage noted.  Denies any frontal sinus pressure.  Pain to palpation of maxillary sinuses.  Vital signs are stable in clinic, she is afebrile.  SPO2 95%.  Prescription for Augmentin provided for sinusitis.  Prescription sent to [facility] pharmacy for 40 mg of prednisone x5 days for acute asthma exacerbation.  Patient counseled on appropriate use of medication.  Instructed to return to ER or urgent care if symptoms worsen or fail to improve.         Differential diagnosis, natural history, supportive care, and indications for immediate follow-up discussed.    Advised the patient to follow-up with the primary care physician for recheck, reevaluation, and consideration of further management.    Please note that this dictation was created using voice recognition software. I have made a reasonable attempt to correct obvious errors, but I expect that there are errors of grammar and possibly content that I did not discover before finalizing the note.    This note was electronically signed by Luann MARKS  GIOVANNI Sanders

## 2023-12-12 ENCOUNTER — OFFICE VISIT (OUTPATIENT)
Dept: MEDICAL GROUP | Facility: MEDICAL CENTER | Age: 63
End: 2023-12-12
Payer: MEDICARE

## 2023-12-12 VITALS
WEIGHT: 152.34 LBS | OXYGEN SATURATION: 94 % | TEMPERATURE: 98.8 F | DIASTOLIC BLOOD PRESSURE: 60 MMHG | BODY MASS INDEX: 23.09 KG/M2 | RESPIRATION RATE: 16 BRPM | HEART RATE: 98 BPM | HEIGHT: 68 IN | SYSTOLIC BLOOD PRESSURE: 106 MMHG

## 2023-12-12 DIAGNOSIS — J45.901 ASTHMA WITH ACUTE EXACERBATION, UNSPECIFIED ASTHMA SEVERITY, UNSPECIFIED WHETHER PERSISTENT: ICD-10-CM

## 2023-12-12 DIAGNOSIS — R05.1 ACUTE COUGH: ICD-10-CM

## 2023-12-12 LAB
FLUAV RNA SPEC QL NAA+PROBE: NEGATIVE
FLUBV RNA SPEC QL NAA+PROBE: NEGATIVE
RSV RNA SPEC QL NAA+PROBE: NEGATIVE
SARS-COV-2 RNA RESP QL NAA+PROBE: NEGATIVE

## 2023-12-12 PROCEDURE — 99214 OFFICE O/P EST MOD 30 MIN: CPT | Performed by: FAMILY MEDICINE

## 2023-12-12 PROCEDURE — 3078F DIAST BP <80 MM HG: CPT | Performed by: FAMILY MEDICINE

## 2023-12-12 PROCEDURE — 0241U POCT CEPHEID COV-2, FLU A/B, RSV - PCR: CPT | Performed by: FAMILY MEDICINE

## 2023-12-12 PROCEDURE — 3074F SYST BP LT 130 MM HG: CPT | Performed by: FAMILY MEDICINE

## 2023-12-12 RX ORDER — METHYLPREDNISOLONE 4 MG/1
TABLET ORAL
Qty: 21 TABLET | Refills: 0 | Status: SHIPPED | OUTPATIENT
Start: 2023-12-12 | End: 2023-12-22

## 2023-12-12 ASSESSMENT — FIBROSIS 4 INDEX: FIB4 SCORE: 1.188670882167041097

## 2023-12-13 NOTE — PROGRESS NOTES
CC: Cough, hoarseness of voice    HPI:   Sita with history of asthma presents today because she has been having cough, mild shortness of breath and hoarseness of voice for 1 day.  Feels a little bit febrile, temperature today is 98.8 F,.  Oxygen saturation is normal in room air.  Lately has been using albuterol inhaler more frequent.  Had 1 dose of oral steroid 20 mg yesterday.  Her voice has changed since yesterday when the symptoms started.  She is currently on Symbicort twice a day and albuterol as needed.  Denies any recent travel or  sick contact      Patient Active Problem List    Diagnosis Date Noted    Moderate episode of recurrent major depressive disorder (HCC) 08/28/2023    Personal history of tobacco use 07/26/2023    Atherosclerosis of aorta (HCC) 04/25/2023    Hyperglycemia 02/27/2023    Dyslipidemia 03/01/2022    Fluid level behind tympanic membrane of both ears 01/13/2022    Palpitations 01/05/2022    History of thyroid nodule 08/13/2021    GERD (gastroesophageal reflux disease) 05/04/2021    Onychomycosis of toenail 04/14/2021    Liver cyst 01/22/2021    Asthma-COPD overlap syndrome 10/04/2010    Osteoporosis 10/04/2010       Current Outpatient Medications   Medication Sig Dispense Refill    methylPREDNISolone (MEDROL DOSEPAK) 4 MG Tablet Therapy Pack As directed on the packaging label. 21 Tablet 0    famotidine (PEPCID) 40 MG Tab TAKE 1 TABLET BY MOUTH EVERY DAY 90 Tablet 2    pantoprazole (PROTONIX) 20 MG tablet TAKE 1 TABLET BY MOUTH EVERY DAY 30 Tablet 0    SYMBICORT 160-4.5 MCG/ACT Aerosol Inhale 2 Puffs 2 times a day. 10.2 g 6    alendronate (FOSAMAX) 70 MG Tab TAKE 1 TABLET BY MOUTH ONE TIME PER WEEK 12 Tablet 3    albuterol 108 (90 Base) MCG/ACT Aero Soln inhalation aerosol Inhale 1-2 Puffs every four hours as needed for Shortness of Breath. 1 Each 2    Calcium-Vitamin D-Vitamin K (VIACTIV CALCIUM PLUS D) 650-12.5-40 MG-MCG-MCG Chew Tab Chew.      montelukast (SINGULAIR) 10 MG Tab TAKE 1  "TABLET BY MOUTH AT BEDTIME (Patient not taking: Reported on 12/12/2023) 90 Tablet 4    sertraline (ZOLOFT) 25 MG tablet TAKE 1 TABLET BY MOUTH EVERY DAY (Patient not taking: Reported on 12/12/2023) 30 Tablet 1     No current facility-administered medications for this visit.         Allergies as of 12/12/2023 - Reviewed 12/12/2023   Allergen Reaction Noted    Cefzil [cefprozil]  04/19/2017        ROS: Denies any chest pain, Shortness of breath, Changes bowel or bladder, Lower extremity edema.    Physical Exam:  /60   Pulse 98   Temp 37.1 °C (98.8 °F) (Temporal)   Resp 16   Ht 1.727 m (5' 8\")   Wt 69.1 kg (152 lb 5.4 oz)   LMP 04/02/2008   SpO2 94%   BMI 23.16 kg/m²   Gen.: Well-developed, well-nourished, no apparent distress,pleasant and cooperative with the examination  Skin:  Warm and dry with good turgor. No rashes or suspicious lesions in visible areas  HEENT:Sinuses nontender with palpation, TMs clear, nares patent with pink mucosa and clear rhinorrhea,no septal deviation ,polyps or lesions. lips without lesions, oropharynx clear.  Neck: Trachea midline,no masses or adenopathy. No JVD.  Cor: Regular rate and rhythm without murmur, gallop or rub.  Lungs: Respirations unlabored.Decreased breath sounds bilaterally. Diffuse wheezes, mild crackles.  Extremities: No cyanosis, clubbing or edema.    Assessment and Plan.   63 y.o. female     1. Acute cough  POCT COVID, flu, RSV are negative.  Lung exam showed more wheeze and crackles.  However I will send patient to have an x-ray to rule out pneumonia    - POCT Cepheid CoV-2, Flu A/B, RSV - PCR  - DX-CHEST-2 VIEWS; Future    2. Asthma with acute exacerbation, unspecified asthma severity, unspecified whether persistent  Leg exam reveals diffuse wheeze, however has normal oxygen saturation.  Will start patient on Medrol Dosepak  Will send an x-ray to rule out associated pneumonia.    - methylPREDNISolone (MEDROL DOSEPAK) 4 MG Tablet Therapy Pack; As directed " on the packaging label.  Dispense: 21 Tablet; Refill: 0

## 2023-12-22 ENCOUNTER — APPOINTMENT (OUTPATIENT)
Dept: MEDICAL GROUP | Facility: MEDICAL CENTER | Age: 63
End: 2023-12-22
Payer: MEDICARE

## 2023-12-22 RX ORDER — PREDNISONE 20 MG/1
20 TABLET ORAL 2 TIMES DAILY WITH MEALS
Qty: 10 TABLET | Refills: 0 | Status: SHIPPED | OUTPATIENT
Start: 2023-12-22 | End: 2023-12-27

## 2023-12-28 ENCOUNTER — TELEPHONE (OUTPATIENT)
Dept: MEDICAL GROUP | Facility: MEDICAL CENTER | Age: 63
End: 2023-12-28
Payer: MEDICARE

## 2023-12-29 NOTE — TELEPHONE ENCOUNTER
Caller Name: Sita Medina  Call Back Number: [phone] (home)      How would the patient prefer to be contacted with a response: Phone call OK to leave a detailed message    Patient got two notifications from [facility] [facility] letting her know that two of her prescriptions would not be covered for Symbicort and famotidine and it not sure what she needs to do next.

## 2024-01-17 NOTE — ASSESSMENT & PLAN NOTE
Chronic, improving. Pt maintains on alendronate 70mg once weekly in addition to calcium and vitamin D supplementation. Recent DEXA from 4/2023 continued to demonstrate osteoporosis of lumbar spine and left femoral neck however there was improvement in density of the spine. Continue current treatment regime in conjunction with weight bearing exercises. Follow up with PCP at least annually for continued monitoring and management.

## 2024-01-17 NOTE — ASSESSMENT & PLAN NOTE
Chronic, stable. Most recent lipid panel from August 2023 demonstrating elevated LDL at 138. Recommend Mediterranean diet and regular physical activity. Recommend she consider statin therapy in the setting of smoking history with evidence of aortic atherosclerosis on imaging. Follow up with PCP at least annually for continued monitoring and management.   Lab Results   Component Value Date/Time    CHOLSTRLTOT 191 08/09/2023 08:43 AM     (H) 08/09/2023 08:43 AM    HDL 40 08/09/2023 08:43 AM    TRIGLYCERIDE 63 08/09/2023 08:43 AM

## 2024-01-17 NOTE — ASSESSMENT & PLAN NOTE
Chronic, stable. Diagnosis made following incidental finding of disease on imaging. Associated with HLD. Encouraged Mediterranean diet and regular physical exercise. Follow up with PCP at least annually for continued monitoring.

## 2024-01-18 ENCOUNTER — OFFICE VISIT (OUTPATIENT)
Dept: MEDICAL GROUP | Facility: PHYSICIAN GROUP | Age: 64
End: 2024-01-18
Attending: FAMILY MEDICINE
Payer: MEDICARE

## 2024-01-18 VITALS
WEIGHT: 155 LBS | SYSTOLIC BLOOD PRESSURE: 122 MMHG | DIASTOLIC BLOOD PRESSURE: 72 MMHG | HEIGHT: 68 IN | BODY MASS INDEX: 23.49 KG/M2

## 2024-01-18 DIAGNOSIS — J44.89 ASTHMA-COPD OVERLAP SYNDROME (HCC): ICD-10-CM

## 2024-01-18 DIAGNOSIS — I70.0 ATHEROSCLEROSIS OF AORTA (HCC): ICD-10-CM

## 2024-01-18 DIAGNOSIS — M81.0 AGE-RELATED OSTEOPOROSIS WITHOUT CURRENT PATHOLOGICAL FRACTURE: Chronic | ICD-10-CM

## 2024-01-18 DIAGNOSIS — J44.9 CHRONIC OBSTRUCTIVE PULMONARY DISEASE, UNSPECIFIED COPD TYPE (HCC): ICD-10-CM

## 2024-01-18 DIAGNOSIS — F32.5 MAJOR DEPRESSIVE DISORDER WITH SINGLE EPISODE, IN FULL REMISSION (HCC): ICD-10-CM

## 2024-01-18 DIAGNOSIS — E78.5 DYSLIPIDEMIA: Chronic | ICD-10-CM

## 2024-01-18 PROCEDURE — 3078F DIAST BP <80 MM HG: CPT | Performed by: PHYSICIAN ASSISTANT

## 2024-01-18 PROCEDURE — G0439 PPPS, SUBSEQ VISIT: HCPCS | Performed by: PHYSICIAN ASSISTANT

## 2024-01-18 PROCEDURE — 3074F SYST BP LT 130 MM HG: CPT | Performed by: PHYSICIAN ASSISTANT

## 2024-01-18 PROCEDURE — 1126F AMNT PAIN NOTED NONE PRSNT: CPT | Performed by: PHYSICIAN ASSISTANT

## 2024-01-18 SDOH — ECONOMIC STABILITY: FOOD INSECURITY: WITHIN THE PAST 12 MONTHS, THE FOOD YOU BOUGHT JUST DIDN'T LAST AND YOU DIDN'T HAVE MONEY TO GET MORE.: NEVER TRUE

## 2024-01-18 SDOH — ECONOMIC STABILITY: INCOME INSECURITY: HOW HARD IS IT FOR YOU TO PAY FOR THE VERY BASICS LIKE FOOD, HOUSING, MEDICAL CARE, AND HEATING?: NOT HARD AT ALL

## 2024-01-18 SDOH — ECONOMIC STABILITY: FOOD INSECURITY: WITHIN THE PAST 12 MONTHS, YOU WORRIED THAT YOUR FOOD WOULD RUN OUT BEFORE YOU GOT MONEY TO BUY MORE.: NEVER TRUE

## 2024-01-18 ASSESSMENT — PAIN SCALES - GENERAL: PAINLEVEL: NO PAIN

## 2024-01-18 ASSESSMENT — ENCOUNTER SYMPTOMS: GENERAL WELL-BEING: GOOD

## 2024-01-18 ASSESSMENT — PATIENT HEALTH QUESTIONNAIRE - PHQ9: CLINICAL INTERPRETATION OF PHQ2 SCORE: 0

## 2024-01-18 ASSESSMENT — FIBROSIS 4 INDEX: FIB4 SCORE: 1.188670882167041097

## 2024-01-18 ASSESSMENT — ACTIVITIES OF DAILY LIVING (ADL): BATHING_REQUIRES_ASSISTANCE: 0

## 2024-01-18 NOTE — ASSESSMENT & PLAN NOTE
Chronic, stable. Pt follows with pulmonology, Dr. Olvera. Maintains on Symbicort BID with albuterol once/week. She has been prescribed Montelukast but chose not to start this due to side effects including suicidal ideation. Encouraged pneumonia and RSV vaccination. Follow up with pulmonology per routine.

## 2024-01-18 NOTE — ASSESSMENT & PLAN NOTE
New diagnosis set in August 2023 following a PHQ of 6. Pt doesn't feel she has depression but was just overwhelmed with all of her responsibilities (caring for her 9 year old daughter and her 90 year old mother) and the challenges with her marriage. Today's PHQ 0/2. Patient never attempted SSRI due to concerns of side effects. She does not go to therapy. Follow up with PCP for continued monitoring and management.

## 2024-01-18 NOTE — ASSESSMENT & PLAN NOTE
Chronic, stable. Pt follows with pulmonology, Dr. Olvera. Maintains on Symbicort BID with PRN use of albuterol. Follow up with pulmonology per routine.

## 2024-01-19 NOTE — PROGRESS NOTES
Comprehensive Health Assessment Program     Sita Medina is a 63 y.o. here for her comprehensive health assessment.    Patient Active Problem List    Diagnosis Date Noted    COPD (chronic obstructive pulmonary disease) (HCC) 2024    Major depressive disorder with single episode, in full remission ([facility]) 2023    Personal history of tobacco use 2023    Atherosclerosis of aorta (HCC) 2023    Hyperglycemia 2023    Dyslipidemia 2022    Fluid level behind tympanic membrane of both ears 2022    Palpitations 2022    History of thyroid nodule 2021    GERD (gastroesophageal reflux disease) 2021    Onychomycosis of toenail 2021    Liver cyst 2021    Asthma-COPD overlap syndrome 10/04/2010    Osteoporosis 10/04/2010       Current Outpatient Medications   Medication Sig Dispense Refill    famotidine (PEPCID) 40 MG Tab TAKE 1 TABLET BY MOUTH EVERY DAY 90 Tablet 2    SYMBICORT 160-4.5 MCG/ACT Aerosol Inhale 2 Puffs 2 times a day. 10.2 g 6    alendronate (FOSAMAX) 70 MG Tab TAKE 1 TABLET BY MOUTH ONE TIME PER WEEK 12 Tablet 3    albuterol 108 (90 Base) MCG/ACT Aero Soln inhalation aerosol Inhale 1-2 Puffs every four hours as needed for Shortness of Breath. 1 Each 2     No current facility-administered medications for this visit.          Current supplements as per medication list.     Allergies:   Cefzil [cefprozil]  Social History     Tobacco Use    Smoking status: Former     Current packs/day: 0.00     Average packs/day: 0.8 packs/day for 30.0 years (22.5 ttl pk-yrs)     Types: Cigarettes     Start date: 1989     Quit date: 2019     Years since quittin.9    Smokeless tobacco: Never   Vaping Use    Vaping Use: Never used   Substance Use Topics    Alcohol use: Never    Drug use: No     Family History   Problem Relation Age of Onset    Cancer Mother         breast    Allergies Mother     Diabetes Father     Cancer Brother          esophageal cancer     Sita  has a past medical history of ASTHMA, COPD (chronic obstructive pulmonary disease) ([facility]) (10/04/2010), COPD (chronic obstructive pulmonary disease) ([facility]) (10/04/2010), COVID-19 virus infection (01/03/2023), Eczema (10/04/2010), Moderate episode of recurrent major depressive disorder ([facility]) (08/28/2023), Onychomycosis of toenail (04/14/2021), Osteopenia (10/04/2010), Recurrent acute sinusitis, Shortness of breath, and Wheezing.    She has no past medical history of CAD (coronary artery disease), Cancer ([facility]), Congestive heart failure ([facility]), Cough, Diabetes, Hypertension, Liver disease, Painful breathing, Renal disorder, Seizure disorder ([facility]), Sputum production, or Stroke ([facility]).   History reviewed. No pertinent surgical history.    Screening:  In the last six months have you experienced any leakage of urine? No    Depression Screening  Little interest or pleasure in doing things?  0 - not at all  Feeling down, depressed , or hopeless? 0 - not at all  Patient Health Questionnaire Score: 0     If depressive symptoms identified deferred to follow up visit unless specifically addressed in assessment and plan.    Interpretation of PHQ-9 Total Score   Score Severity   1-4 No Depression   5-9 Mild Depression   10-14 Moderate Depression   15-19 Moderately Severe Depression   20-27 Severe Depression    Screening for Cognitive Impairment  Do you or any of your friends or family members have any concern about your memory? No  Three Minute Recall (Banana, Sunrise, Chair) 3/3    Alistair clock face with all 12 numbers and set the hands to show 20 past 8.  Yes 5/5  Cognitive concerns identified deferred for follow up unless specifically addressed in assessment and plan.    Fall Risk Assessment  Has the patient had two or more falls in the last year or any fall with injury in the last year?  No    Safety Assessment  Do you always wear your seatbelt?  Yes  Any changes to home needed to function safely?  No  Difficulty hearing.  No  Patient counseled about all safety risks that were identified.    Functional Assessment ADLs  Are there any barriers preventing you from cooking for yourself or meeting nutritional needs?  No.    Are there any barriers preventing you from driving safely or obtaining transportation?  No.    Are there any barriers preventing you from using a telephone or calling for help?  No    Are there any barriers preventing you from shopping?  No.    Are there any barriers preventing you from taking care of your own finances?  No    Are there any barriers preventing you from managing your medications?  No    Are there any barriers preventing you from showering, bathing or dressing yourself? No    Are there any barriers preventing you from doing housework or laundry? No  Are there any barriers preventing you from using the toilet?No  Are you currently engaging in any exercise or physical activity?  Yes. chevy Garcia     Self-Assessment of Health  What is your perception of your health? Good  Do you sleep more than six hours a night? Yes  In the past 7 days, how much did pain keep you from doing your normal work? None  Do you spend quality time with family or friends (virtually or in person)? Yes  Do you usually eat a heart healthy diet that constists of a variety of fruits, vegetables, whole grains and fiber? No  Do you eat foods high in fat and/or Fast Food more than three times per week? No    Advance Care Planning  Do you have an Advance Directive, Living Will, Durable Power of , or POLST? No                 Health Maintenance Summary            Overdue - HIV Screening (Once) Overdue - never done      No completion history exists for this topic.              Overdue - IMM DTaP/Tdap/Td Vaccine (1 - Tdap) Overdue - never done      No completion history exists for this topic.              Overdue - COVID-19 Vaccine (5 - 2023-24 season) Overdue since 9/1/2023      10/26/2022  Imm Admin:  MODERNA BIVALENT BOOSTER SARS-COV-2 VACCINE (6+)    12/18/2021  Imm Admin: MODERNA SARS-COV-2 VACCINE (12+)    12/18/2021  Imm Admin: MODERNA SARS-COV-2 VACCINE (12+)    03/16/2021  Imm Admin: MODERNA SARS-COV-2 VACCINE (12+)    02/15/2021  Imm Admin: MODERNA SARS-COV-2 VACCINE (12+)              Postponed - Pneumococcal Vaccine: 65+ Years (1 - PCV) Postponed until 10/25/2024      No completion history exists for this topic.              Cervical Cancer Screening (Every 3 Years) Tentatively due on 4/14/2024 04/14/2021  THINPREP PAP WITH HPV    04/14/2021  Pathology Gynecology Specimen              Annual Pulmonary Function Test / Spirometry (Yearly) Next due on 8/21/2024 08/21/2023  PULMONARY FUNCTION TESTS -Test requested: Complete Pulmonary Function Test              Lung Cancer Screening (Yearly) Tentatively due on 8/30/2024 08/30/2023  CT-LUNG CANCER-SCREENING    08/04/2023  OUTSIDE IMAGES-CT CHEST    12/26/2022  CT-CTA CHEST PULMONARY ARTERY W/ RECONS    11/15/2021  Outside Procedure: PA LOW DOSE CT LUNG CA SCREEN W/O CONT    11/12/2020  CT-LUNG CANCER-SCREENING    Only the first 5 history entries have been loaded, but more history exists.              Mammogram (Every 2 Years) Next due on 1/12/2025 01/12/2023  MA-SCREENING MAMMO BILAT W/TOMOSYNTHESIS W/CAD    04/14/2021  MA-SCREENING MAMMO BILAT W/TOMOSYNTHESIS W/CAD    08/14/2008  MA-CAD DIAGNOSTIC-MAMMO    08/14/2008  MA-DIAGNOSTIC DIGITAL MAMMO-BILAT    12/19/2007  MA-DIAGNOSTIC DIGITAL MAMMO-UNILAT    Only the first 5 history entries have been loaded, but more history exists.              Annual Wellness Visit (Yearly) Next due on 1/18/2025 01/18/2024  Level of Service: PA ANNUAL WELLNESS VISIT-INCLUDES PPPS SUBSEQUE*    08/28/2023  Visit Dx: Encounter for Medicare annual wellness exam    04/25/2023  Level of Service: PA ANNUAL WELLNESS VISIT-INCLUDES PPPS SUBSEQUE*              Bone Density Scan (Every 5 Years) Next due on  2023  DS-BONE DENSITY STUDY (DEXA)    2021  DS-BONE DENSITY STUDY (DEXA)    2008  DS-BONE DENSITY STUDY (DEXA)    2005  DS-BONE DENSITY STUDY (DEXA)              Colorectal Cancer Screening (Colonoscopy - Every 10 Years) Tentatively due on 2020  REFERRAL TO GI FOR COLONOSCOPY              Hepatitis C Screening  Tentatively Complete      2021  Hepatitis C Antibody component of HCV Scrn ( [id number] 1xLife)              Influenza Vaccine (Series Information) Completed      10/05/2023  Imm Admin: Influenza, Unspecified - HISTORICAL DATA    10/26/2022  Imm Admin: Influenza Vac Subunit Quad Inj (Pf)    2021  Imm Admin: Influenza Seasonal Injectable - Historical Data    10/21/2020  Imm Admin: Influenza Vaccine Quad Inj (Pf)    10/21/2020  Imm Admin: Influenza (IM) Preservative Free - HISTORICAL DATA    Only the first 5 history entries have been loaded, but more history exists.              Hepatitis A Vaccine (Hep A) (Series Information) Aged Out      No completion history exists for this topic.              Hepatitis B Vaccine (Hep B) (Series Information) Aged Out      No completion history exists for this topic.              HPV Vaccines (Series Information) Aged Out      No completion history exists for this topic.              Polio Vaccine (Inactivated Polio) (Series Information) Aged Out      No completion history exists for this topic.              Meningococcal Immunization (Series Information) Aged Out      No completion history exists for this topic.              Discontinued - Zoster (Shingles) Vaccines  Discontinued      2021  Imm Admin: Zoster Vaccine Recombinant (RZV) (SHINGRIX)    2021  Imm Admin: Zoster Vaccine Recombinant (RZV) (SHINGRIX)              Discontinued - Lung Cancer Screening Shared Decision Making  Discontinued        Frequency changed to Never automatically (Topic No Longer Applies)    2023  Registry  "Metric: Lung Cancer Shared Decision Making Conversation Date                    Patient Care Team:  GIOVANNI Donaldson as PCP - General (Nurse Practitioner Family)      Financial Resource Strain: Low Risk  (1/18/2024)    Overall Financial Resource Strain (CARDIA)     Difficulty of Paying Living Expenses: Not hard at all      Transportation Needs: No Transportation Needs (1/18/2024)    PRAPARE - Transportation     Lack of Transportation (Medical): No     Lack of Transportation (Non-Medical): No      Food Insecurity: No Food Insecurity (1/18/2024)    Hunger Vital Sign     Worried About Running Out of Food in the Last Year: Never true     Ran Out of Food in the Last Year: Never true        Encounter Vitals  Blood Pressure : 122/72  Weight: 70.3 kg (155 lb)  Height: 172.7 cm (5' 8\")  BMI (Calculated): 23.57  Pain Score: No pain     Alert, oriented in no acute distress.  Eye contact is good, speech goal directed, affect calm.    Assessment and Plan. The following treatment and monitoring plan is recommended:  Osteoporosis  Chronic, improving. Pt maintains on alendronate 70mg once weekly in addition to calcium and vitamin D supplementation. Recent DEXA from 4/2023 continued to demonstrate osteoporosis of lumbar spine and left femoral neck however there was improvement in density of the spine. Continue current treatment regime in conjunction with weight bearing exercises. Follow up with PCP at least annually for continued monitoring and management.    Dyslipidemia  Chronic, stable. Most recent lipid panel from August 2023 demonstrating elevated LDL at 138. Recommend Mediterranean diet and regular physical activity. Recommend she consider statin therapy in the setting of smoking history with evidence of aortic atherosclerosis on imaging. Follow up with PCP at least annually for continued monitoring and management.   Lab Results   Component Value Date/Time    CHOLSTRLTOT 191 08/09/2023 08:43 AM     (H) " 08/09/2023 08:43 AM    HDL 40 08/09/2023 08:43 AM    TRIGLYCERIDE 63 08/09/2023 08:43 AM         Atherosclerosis of aorta (HCC)  Chronic, stable. Diagnosis made following incidental finding of disease on imaging. Associated with HLD. Encouraged Mediterranean diet and regular physical exercise. Follow up with PCP at least annually for continued monitoring.     Major depressive disorder with single episode, in full remission (HCC)  New diagnosis set in August 2023 following a PHQ of 6. Pt doesn't feel she has depression but was just overwhelmed with all of her responsibilities (caring for her 9 year old daughter and her 90 year old mother) and the challenges with her marriage. Today's PHQ 0/2. Patient never attempted SSRI due to concerns of side effects. She does not go to therapy. Follow up with PCP for continued monitoring and management.    COPD (chronic obstructive pulmonary disease) (HCC)  Asthma-COPD overlap syndrome  Chronic, stable. Pt follows with pulmonology, Dr. Olvera. Maintains on Symbicort BID with albuterol once/week. She has been prescribed Montelukast but chose not to start this due to side effects including suicidal ideation. Encouraged pneumonia and RSV vaccination. Follow up with pulmonology per routine.     Services suggested: No services needed at this time  Health Care Screening: Age-appropriate preventive services recommended by USPTF and ACIP covered by Medicare were discussed today. Services ordered if indicated and agreed upon by the patient.  Referrals offered: Community-based lifestyle interventions to reduce health risks and promote self-management and wellness, fall prevention, nutrition, physical activity, tobacco-use cessation, weight loss, and mental health services as per orders if indicated.    Discussion today about general wellness and lifestyle habits:    Prevent falls and reduce trip hazards; Cautioned about securing or removing rugs.  Have a working fire alarm and carbon  monoxide detector.  Engage in regular physical activity and social activities.    Follow-up: Return for follow up visit with PCP as previously scheduled.

## 2024-01-25 ENCOUNTER — APPOINTMENT (OUTPATIENT)
Dept: SLEEP MEDICINE | Facility: MEDICAL CENTER | Age: 64
End: 2024-01-25
Attending: NURSE PRACTITIONER
Payer: MEDICARE

## 2024-02-14 RX ORDER — ALENDRONATE SODIUM 70 MG/1
TABLET ORAL
Qty: 12 TABLET | Refills: 3 | Status: SHIPPED | OUTPATIENT
Start: 2024-02-14

## 2024-03-04 DIAGNOSIS — I47.10 SVT (SUPRAVENTRICULAR TACHYCARDIA) (HCC): ICD-10-CM

## 2024-03-05 ENCOUNTER — OFFICE VISIT (OUTPATIENT)
Dept: MEDICAL GROUP | Facility: MEDICAL CENTER | Age: 64
End: 2024-03-05
Payer: MEDICARE

## 2024-03-05 VITALS
HEART RATE: 61 BPM | OXYGEN SATURATION: 96 % | WEIGHT: 157.19 LBS | DIASTOLIC BLOOD PRESSURE: 66 MMHG | HEIGHT: 68 IN | SYSTOLIC BLOOD PRESSURE: 114 MMHG | TEMPERATURE: 97.2 F | BODY MASS INDEX: 23.82 KG/M2

## 2024-03-05 DIAGNOSIS — H65.93 MIDDLE EAR EFFUSION, BILATERAL: ICD-10-CM

## 2024-03-05 DIAGNOSIS — K62.5 RECTAL BLEEDING: ICD-10-CM

## 2024-03-05 DIAGNOSIS — J44.9 CHRONIC OBSTRUCTIVE PULMONARY DISEASE, UNSPECIFIED COPD TYPE (HCC): ICD-10-CM

## 2024-03-05 DIAGNOSIS — R10.32 ABDOMINAL WALL PAIN IN BOTH LOWER QUADRANTS: ICD-10-CM

## 2024-03-05 DIAGNOSIS — I47.10 SVT (SUPRAVENTRICULAR TACHYCARDIA) (HCC): ICD-10-CM

## 2024-03-05 DIAGNOSIS — M54.2 ACUTE NECK PAIN: ICD-10-CM

## 2024-03-05 DIAGNOSIS — R10.31 ABDOMINAL WALL PAIN IN BOTH LOWER QUADRANTS: ICD-10-CM

## 2024-03-05 PROBLEM — R00.2 PALPITATIONS: Status: RESOLVED | Noted: 2022-01-05 | Resolved: 2024-03-05

## 2024-03-05 PROCEDURE — 3074F SYST BP LT 130 MM HG: CPT | Performed by: FAMILY MEDICINE

## 2024-03-05 PROCEDURE — 99215 OFFICE O/P EST HI 40 MIN: CPT | Performed by: FAMILY MEDICINE

## 2024-03-05 PROCEDURE — 3078F DIAST BP <80 MM HG: CPT | Performed by: FAMILY MEDICINE

## 2024-03-05 RX ORDER — METHYLPREDNISOLONE 4 MG/1
TABLET ORAL
Qty: 21 TABLET | Refills: 0 | Status: SHIPPED | OUTPATIENT
Start: 2024-03-05

## 2024-03-05 ASSESSMENT — FIBROSIS 4 INDEX: FIB4 SCORE: 1.188670882167041097

## 2024-03-05 NOTE — PROGRESS NOTES
Verbal consent was acquired by the patient to use [employer] ambient listening note generation during this visit: YES    Subjective:   CC:   Chief Complaint   Patient presents with    Neck Pain     Left side, base of skull x 4 wks    Abdominal Pain     Off and on     Stool Color Change     Bloody stool, outside          HPI:   History of Present Illness  The patient presents for evaluation of multiple medical concerns.    She has some type of hernia or muscle across her abdomen that is tight and feels like it is strainling all the time. Her symptoms started about 6 months ago. It went away for a little while, but every time she has a cold or cough, it will come back. Last week, it was really bad, but today it is not as bad. It is hard to sit straight forward because it pulls all the way across. She has not noticed any bulging or mass.  She denies any constipation or diarrhea. She has some blood in her stool, but she does not think it is a hemorrhoid. It is deep inside.     She sees blood on the toilet paper when she wipes. This has been going on for about 3 to 4 weeks.     Her last colonoscopy was in 2020, which was normal. It feels like something is scraping open inside. She denies any abdominal surgery. She denies any fever or chills. No changes of her symptoms with or without meals.  Pain does not wake her up at night. She has not lost weight recently.     She originally thought it was from exercising. She was taking her mother to an exercise class, so she thought that maybe caused it, but she has not done that for 6 months, and it is not going away. She has regular bowel movements. She denies any pain with urination. She does not have to strain a lot when she has a bowel movement. She has very hard stools. She tries to drink enough water. She rates her stool as 3 to 4 on the stool chart. It hurts when she is having a bowel movement, but as soon as she is done, it does not hurt anymore. She has not been lifting  "or pulling anything heavy in the past 3 months.    She has a bad pain in the back of her skull that she has had for weeks. It is always at the base of her skull. It radiates upwards. Her ear feels always full. She has tried Advil for the neck pain, which does help a little. She denies any trauma. She thought it was a pillow, but it has not made a difference. It is always there. If she moves her head, it hurts a lot. She is concerned about a lymph node.  Denies history of fall or trauma to the spine.    Objective:     Exam:  /66 (BP Location: Left arm, Patient Position: Sitting, BP Cuff Size: Adult long)   Pulse 61   Temp 36.2 °C (97.2 °F) (Temporal)   Ht 1.727 m (5' 8\")   Wt 71.3 kg (157 lb 3 oz)   LMP 04/02/2008   SpO2 96%   BMI 23.90 kg/m²  Body mass index is 23.9 kg/m².    Constitutional: awake, alert, in no distress.  Skin: Warm, dry, good turgor, no rashes, bruises, ulcers in visible areas.  Eye: conjunctiva clear, lids neg for edema or lesions.  ENMT: Bilateral ear effusion. Oral and nasal mucosa wnl. Lips without lesions, good dentition, oropharynx clear.  Neck: Trachea midline, no masses, no thyromegaly. No cervical or supraclavicular lymphadenopathy  Respiratory: Unlabored respiratory effort, lungs clear to auscultation, no wheezes, no rales.  Cardiovascular: Normal S1, S2, no murmur, no pedal edema.  Abdomen: Soft, obese, mild tender to palpation at the left/right lower quadrant, active BS, no hernia, no hepatosplenomegaly, negative rebound or guarding.   - small anal tears w/o active bleeding, small non-bleeding, non-tender hemorrhoid  Psych: Oriented x3, affect and mood wnl, intact judgement and insight.   MSK: No visible deformity of the cervical spine, negative hematoma, rash.  Focal tenderness to palpation at the base of the skull on the left.  Mild reduced cervical ROM.  Intact neuromuscular exam bilateral upper extremities.    A chaperone was offered to the patient during today's exam.: " Patient declined.    Results  Imaging  Colonoscopy from 2020 was reviewed with the patient.    Assessment & Plan:     1. Abdominal wall pain in both lower quadrants  History and exam are concerning for abdominal wall pain at the lower quadrants.   - US-HERNIA ABDOMEN; Future  -Rest, activity modification  -Avoid heavy lifting, pulling, pushing, reaching  -Avoid constipation and with bowel movement.    2. Rectal bleeding  Patient complains of intermittent rectal bleeding noticed on toilet paper after bowel movement.  History and exams are concerning for anal tear and hemorrhoid.  Patient had negative colonoscopy in 2020.  Denies unexplained weight loss, personal/family history of colon cancer.  She endorses intermittent hard stool and constipation.  Patient is not taking blood thinner or antiplatelet medications  -Hydration, increase fiber in her diet  -Trial MiraLAX nightly  -Regular exercises  -Follow-up with PCP if symptoms are persistent.    3. Acute neck pain  History and exam are concerning for acute cervical musculoskeletal strain.   - diclofenac sodium (VOLTAREN) 1 % Gel; Apply to 2-4 gram to affected area 4 times daily  Dispense: 100 g; Refill: 0  -Heat, massage, stretching exercises (handout provided)  -Follow-up with PCP in 3 months if symptoms fail to improve.    4. Middle ear effusion, bilateral  History and exam are concerning for bilateral ear effusion.  - methylPREDNISolone (MEDROL DOSEPAK) 4 MG Tablet Therapy Pack; 6 tabs day 1, 5 tabs day 2, 4 tabs day 3, 3 tabs day 4, 2 tabs day 5, 1 tab day 6. Take with food.  Dispense: 21 Tablet; Refill: 0    Total time spent reviewing pt's chart, labs, notes, imaging, and counseling/prescribing medications to patient before, during, and after the visit: 40 minutes.           HCC Gap Form    Diagnosis to address: I47.10 - SVT (supraventricular tachycardia)  Assessment and plan: Chronic, stable, asymptomatic, does not require treatment.  Will continue to  monitor.  Last edited 03/05/24 12:50 PST by Marah Caceres M.D.         Return for follow up with PCP as directed, As needed.        Please note that this dictation was created using voice recognition software. I have made every reasonable attempt to correct obvious errors, but I expect that there are errors of grammar and possibly content that I did not discover before finalizing the note.

## 2024-03-06 DIAGNOSIS — J44.9 CHRONIC OBSTRUCTIVE PULMONARY DISEASE, UNSPECIFIED COPD TYPE (HCC): ICD-10-CM

## 2024-03-06 RX ORDER — BUDESONIDE AND FORMOTEROL FUMARATE DIHYDRATE 160; 4.5 UG/1; UG/1
2 AEROSOL RESPIRATORY (INHALATION) 2 TIMES DAILY
Qty: 10.2 EACH | Refills: 6 | Status: SHIPPED | OUTPATIENT
Start: 2024-03-06 | End: 2024-03-07

## 2024-03-07 RX ORDER — BUDESONIDE AND FORMOTEROL FUMARATE DIHYDRATE 160; 4.5 UG/1; UG/1
2 AEROSOL RESPIRATORY (INHALATION) 2 TIMES DAILY
Qty: 10.2 EACH | Refills: 6 | Status: SHIPPED | OUTPATIENT
Start: 2024-03-07 | End: 2024-03-12 | Stop reason: CLARIF

## 2024-03-12 ENCOUNTER — OFFICE VISIT (OUTPATIENT)
Dept: SLEEP MEDICINE | Facility: MEDICAL CENTER | Age: 64
End: 2024-03-12
Attending: NURSE PRACTITIONER
Payer: MEDICARE

## 2024-03-12 VITALS
RESPIRATION RATE: 16 BRPM | BODY MASS INDEX: 22.94 KG/M2 | DIASTOLIC BLOOD PRESSURE: 64 MMHG | HEIGHT: 68 IN | HEART RATE: 83 BPM | WEIGHT: 151.4 LBS | OXYGEN SATURATION: 94 % | SYSTOLIC BLOOD PRESSURE: 110 MMHG

## 2024-03-12 DIAGNOSIS — J44.89 ASTHMA-COPD OVERLAP SYNDROME (HCC): ICD-10-CM

## 2024-03-12 DIAGNOSIS — Z87.891 PERSONAL HISTORY OF TOBACCO USE: ICD-10-CM

## 2024-03-12 PROCEDURE — 99214 OFFICE O/P EST MOD 30 MIN: CPT | Performed by: NURSE PRACTITIONER

## 2024-03-12 PROCEDURE — 3074F SYST BP LT 130 MM HG: CPT | Performed by: NURSE PRACTITIONER

## 2024-03-12 PROCEDURE — 99213 OFFICE O/P EST LOW 20 MIN: CPT | Performed by: NURSE PRACTITIONER

## 2024-03-12 PROCEDURE — 3078F DIAST BP <80 MM HG: CPT | Performed by: NURSE PRACTITIONER

## 2024-03-12 RX ORDER — FLUTICASONE PROPIONATE AND SALMETEROL 100; 50 UG/1; UG/1
1 POWDER RESPIRATORY (INHALATION) EVERY 12 HOURS
Qty: 3 EACH | Refills: 3 | Status: SHIPPED | OUTPATIENT
Start: 2024-03-12

## 2024-03-12 ASSESSMENT — FIBROSIS 4 INDEX: FIB4 SCORE: 1.188670882167041097

## 2024-03-12 NOTE — PROGRESS NOTES
Chief Complaint   Patient presents with    Follow-Up     Last seen 10/25/2023 Kameron Olvera    F/v COPD       HPI:  Sita Medina is a 63 y.o. year old female here today for follow-up on COPD overlap.  Last seen to establish care on 10/25/2023 by Dr. Olvera.  Previously followed by Dr. Crowley at [facility].  PFTs 8/2023 FEV1 2.01 L, 75% predicted, partial BD response mid flow rates, preserved lung volumes and diffusion capacity     CT chest 8/2023 mild emphysema, increased AP diameter, no nodules    Patient does complain of shortness of breath.  She had contracted COVID in 2022.  Patient gets short of breath or dyspnea with walking up hills or stairs.  Currently uses Advair as well as albuterol as needed.  Denies any exacerbations since last visit.    ROS: As per HPI and otherwise negative if not stated.    Past Medical History:   Diagnosis Date    ASTHMA     COPD (chronic obstructive pulmonary disease) (Formerly [facility]) 10/04/2010    COPD (chronic obstructive pulmonary disease) (Formerly [facility]) 10/04/2010    COVID-19 virus infection 01/03/2023    Went to ER on 12/25 for SOB and she thought was an asthma attack. Work up was unremarkable except for positive for COVID infection. She was given nebulizer treatment which was very helpful and sent home with Paxlovid which she took as prescribed until the 31st with no side effects. Also prescibed 40mg prednisone which she took for 2 days total and did not take 3rd dose due to feeling well.  She fe    Eczema 10/04/2010    Moderate episode of recurrent major depressive disorder (Formerly [facility]) 08/28/2023    Onychomycosis of toenail 04/14/2021    Osteopenia 10/04/2010    Recurrent acute sinusitis     Shortness of breath     Wheezing        No past surgical history on file.    Family History   Problem Relation Age of Onset    Cancer Mother         breast    Allergies Mother     Diabetes Father     Cancer Brother         esophageal cancer       Allergies as of 03/12/2024 - Reviewed  "03/12/2024   Allergen Reaction Noted    Cefzil [cefprozil]  04/19/2017        Vitals:  /64 (BP Location: Left arm, Patient Position: Sitting, BP Cuff Size: Adult)   Pulse 83   Resp 16   Ht 1.727 m (5' 8\")   Wt 68.7 kg (151 lb 6.4 oz)   SpO2 94%     Current medications as of today   Current Outpatient Medications   Medication Sig Dispense Refill    fluticasone-salmeterol (ADVAIR) 100-50 MCG/ACT AEROSOL POWDER, BREATH ACTIVATED Inhale 1 Puff every 12 hours. 3 Each 3    alendronate (FOSAMAX) 70 MG Tab TAKE 1 TABLET BY MOUTH ONE TIME PER WEEK 12 Tablet 3    famotidine (PEPCID) 40 MG Tab TAKE 1 TABLET BY MOUTH EVERY DAY 90 Tablet 2    albuterol 108 (90 Base) MCG/ACT Aero Soln inhalation aerosol Inhale 1-2 Puffs every four hours as needed for Shortness of Breath. 1 Each 2    methylPREDNISolone (MEDROL DOSEPAK) 4 MG Tablet Therapy Pack 6 tabs day 1, 5 tabs day 2, 4 tabs day 3, 3 tabs day 4, 2 tabs day 5, 1 tab day 6. Take with food. (Patient not taking: Reported on 3/12/2024) 21 Tablet 0    diclofenac sodium (VOLTAREN) 1 % Gel Apply to 2-4 gram to affected area 4 times daily (Patient not taking: Reported on 3/12/2024) 100 g 0     No current facility-administered medications for this visit.         Physical Exam:   Gen:           Alert and oriented, No apparent distress. Mood and affect appropriate, normal interaction with examiner.  Eyes:          PERRL, EOM intact, sclere white, conjunctive moist.  Ears:          Not examined.   Hearing:     Grossly intact.  Nose:          Normal, no lesions or deformities.  Dentition:    Good dentition.  Oropharynx:   Tongue normal, posterior pharynx without erythema or exudate.  Neck:        Supple, trachea midline, no masses.  Respiratory Effort: No intercostal retractions or use of accessory muscles.   Lung Auscultation:      Clear to auscultation bilaterally; no rales, rhonchi or wheezing.  CV:            Regular rate and rhythm. No murmurs, rubs or gallops.  Abd:         "   Not examined.   Lymphadenopathy: Not examined.  Gait and Station: Normal.  Digits and Nails: No clubbing, cyanosis, petechiae, or nodes.   Cranial Nerves: II-XII grossly intact.  Skin:        No rashes, lesions or ulcers noted.               Ext:           No cyanosis or edema.      Assessment:  1. Asthma-COPD overlap syndrome  fluticasone-salmeterol (ADVAIR) 100-50 MCG/ACT AEROSOL POWDER, BREATH ACTIVATED    Overnight Oximetry      2. Personal history of tobacco use  fluticasone-salmeterol (ADVAIR) 100-50 MCG/ACT AEROSOL POWDER, BREATH ACTIVATED    Overnight Oximetry              Plan:  Continue current regimen of ICS/LABA and albuterol as needed.  Patient denies any exacerbations and feels like breathing is stable.  Will conduct overnight pulse oximetry.    Please note that this dictation was created using voice recognition software. I have made every reasonable attempt to correct obvious errors, but it is possible there are errors of grammar and possibly content that I did not discover before finalizing the note.

## 2024-03-14 ENCOUNTER — DOCUMENTATION (OUTPATIENT)
Dept: HEALTH INFORMATION MANAGEMENT | Facility: OTHER | Age: 64
End: 2024-03-14
Payer: MEDICARE

## 2024-03-18 ENCOUNTER — HOSPITAL ENCOUNTER (OUTPATIENT)
Dept: RADIOLOGY | Facility: MEDICAL CENTER | Age: 64
End: 2024-03-18
Attending: FAMILY MEDICINE
Payer: MEDICARE

## 2024-03-18 DIAGNOSIS — R10.31 ABDOMINAL WALL PAIN IN BOTH LOWER QUADRANTS: ICD-10-CM

## 2024-03-18 DIAGNOSIS — R10.32 ABDOMINAL WALL PAIN IN BOTH LOWER QUADRANTS: ICD-10-CM

## 2024-03-18 PROCEDURE — 76705 ECHO EXAM OF ABDOMEN: CPT

## 2024-03-27 ENCOUNTER — APPOINTMENT (OUTPATIENT)
Dept: SLEEP MEDICINE | Facility: MEDICAL CENTER | Age: 64
End: 2024-03-27
Attending: NURSE PRACTITIONER
Payer: MEDICARE

## 2024-03-27 DIAGNOSIS — J44.89 ASTHMA-COPD OVERLAP SYNDROME (HCC): ICD-10-CM

## 2024-03-27 DIAGNOSIS — Z87.891 PERSONAL HISTORY OF TOBACCO USE: ICD-10-CM

## 2024-03-27 PROCEDURE — 94762 N-INVAS EAR/PLS OXIMTRY CONT: CPT | Performed by: STUDENT IN AN ORGANIZED HEALTH CARE EDUCATION/TRAINING PROGRAM

## 2024-04-02 DIAGNOSIS — M54.2 ACUTE NECK PAIN: ICD-10-CM

## 2024-04-03 NOTE — PROCEDURES
Overnight Pulse Oximetry    Interpretation:    This overnight oximetry was recorded on 3/28/2024 with the patient on room air.  The analysis duration was 7hrs 5min.  The saturations were less than 90% for 58% of the recording.  Basal oxygen saturation of 89.5%. The mac saturation was 68% .  The patient spent 43 minutes with saturations < 88%.  Overall, this continuous nocturnal oximetry demonstrates nocturnal hypoxia while on room air.    Recommendation:  Nocturnal hypoxia seen during night of study.  May consider nocturnal supplemental oxygen 2 L/min.  Clinical correlation is needed.

## 2024-06-06 ENCOUNTER — OFFICE VISIT (OUTPATIENT)
Dept: SLEEP MEDICINE | Facility: MEDICAL CENTER | Age: 64
End: 2024-06-06
Attending: NURSE PRACTITIONER
Payer: MEDICARE

## 2024-06-06 VITALS
WEIGHT: 157 LBS | RESPIRATION RATE: 16 BRPM | BODY MASS INDEX: 23.79 KG/M2 | SYSTOLIC BLOOD PRESSURE: 92 MMHG | HEART RATE: 91 BPM | DIASTOLIC BLOOD PRESSURE: 60 MMHG | HEIGHT: 68 IN | OXYGEN SATURATION: 90 %

## 2024-06-06 DIAGNOSIS — G47.30 SLEEP DISORDER BREATHING: ICD-10-CM

## 2024-06-06 DIAGNOSIS — J44.89 ASTHMA-COPD OVERLAP SYNDROME (HCC): ICD-10-CM

## 2024-06-06 DIAGNOSIS — G47.34 NOCTURNAL HYPOXIA: ICD-10-CM

## 2024-06-06 PROCEDURE — 3074F SYST BP LT 130 MM HG: CPT | Performed by: NURSE PRACTITIONER

## 2024-06-06 PROCEDURE — 99213 OFFICE O/P EST LOW 20 MIN: CPT | Performed by: NURSE PRACTITIONER

## 2024-06-06 PROCEDURE — 3078F DIAST BP <80 MM HG: CPT | Performed by: NURSE PRACTITIONER

## 2024-06-06 PROCEDURE — 99214 OFFICE O/P EST MOD 30 MIN: CPT | Performed by: NURSE PRACTITIONER

## 2024-06-06 ASSESSMENT — FIBROSIS 4 INDEX: FIB4 SCORE: 1.21

## 2024-06-06 NOTE — PROGRESS NOTES
Chief Complaint   Patient presents with    Follow-Up     SLEEP - RESULTS - CHART PREP COMPLETED ON 06/03/2024    Last Office Visit 03/12/202 with HOLLI Harris    Sleep Study Complete on N/A     OPO Completed on 03/28/2024   Pressures Completed on RA    Raw Data in Media? YES  , If raw data is missing has sleep tech been notified? N/A    Interp Completed? YES  , If pending has provider been notified to complete? N/A          HPI:  Sita Medina is a 64 y.o. year old female here today for follow-up on asthma COPD overlap with OPO results.  Last seen 3/12/2024 by me. Previously followed by Dr. Crowley at [facility].  PFTs 8/2023 FEV1 2.01 L, 75% predicted, partial BD response mid flow rates, preserved lung volumes and diffusion capacity     CT chest 8/2023 mild emphysema, increased AP diameter, no nodules     Patient does complain of shortness of breath.  She had contracted COVID in 2022.  Patient gets short of breath or dyspnea with walking up hills or stairs.  Currently uses Advair as well as albuterol as needed.  Denies any exacerbations since last visit.  Does complain of persistent daytime fatigue and snoring with sleeping.  Denies any excessive daytime sleepiness or waking up snorting or gasping.      Overnight oximetry (3/27/2024):  his overnight oximetry was recorded on 3/28/2024 with the patient on room air.  The analysis duration was 7hrs 5min.  The saturations were less than 90% for 58% of the recording.  Basal oxygen saturation of 89.5%. The mac saturation was 68% .  The patient spent 43 minutes with saturations < 88%.  Overall, this continuous nocturnal oximetry demonstrates nocturnal hypoxia while on room air.     Recommendation:  Nocturnal hypoxia seen during night of study.  May consider nocturnal supplemental oxygen 2 L/min.  Clinical correlation is needed.    ROS: As per HPI and otherwise negative if not stated.    Past Medical History:   Diagnosis Date    ASTHMA     COPD  "(chronic obstructive pulmonary disease) (Formerly [facility]) 10/04/2010    COPD (chronic obstructive pulmonary disease) (Formerly [facility]) 10/04/2010    COVID-19 virus infection 01/03/2023    Went to ER on 12/25 for SOB and she thought was an asthma attack. Work up was unremarkable except for positive for COVID infection. She was given nebulizer treatment which was very helpful and sent home with Paxlovid which she took as prescribed until the 31st with no side effects. Also prescibed 40mg prednisone which she took for 2 days total and did not take 3rd dose due to feeling well.  She fe    Eczema 10/04/2010    Moderate episode of recurrent major depressive disorder (Formerly [facility]) 08/28/2023    Onychomycosis of toenail 04/14/2021    Osteopenia 10/04/2010    Recurrent acute sinusitis     Shortness of breath     Wheezing        No past surgical history on file.    Family History   Problem Relation Age of Onset    Cancer Mother         breast    Allergies Mother     Diabetes Father     Cancer Brother         esophageal cancer       Allergies as of 06/06/2024 - Reviewed 06/06/2024   Allergen Reaction Noted    Cefzil [cefprozil]  04/19/2017        Vitals:  BP 92/60 (BP Location: Left arm, Patient Position: Sitting, BP Cuff Size: Adult)   Pulse 91   Resp 16   Ht 1.727 m (5' 8\")   Wt 71.2 kg (157 lb)   SpO2 90%     Current medications as of today   Current Outpatient Medications   Medication Sig Dispense Refill    fluticasone-salmeterol (ADVAIR) 100-50 MCG/ACT AEROSOL POWDER, BREATH ACTIVATED Inhale 1 Puff every 12 hours. 3 Each 3    alendronate (FOSAMAX) 70 MG Tab TAKE 1 TABLET BY MOUTH ONE TIME PER WEEK 12 Tablet 3    famotidine (PEPCID) 40 MG Tab TAKE 1 TABLET BY MOUTH EVERY DAY 90 Tablet 2    albuterol 108 (90 Base) MCG/ACT Aero Soln inhalation aerosol Inhale 1-2 Puffs every four hours as needed for Shortness of Breath. 1 Each 2    diclofenac sodium (VOLTAREN) 1 % Gel APPLY TO 2-4 GRAM TO AFFECTED AREA 4 TIMES DAILY 100 g 0    methylPREDNISolone " (MEDROL DOSEPAK) 4 MG Tablet Therapy Pack 6 tabs day 1, 5 tabs day 2, 4 tabs day 3, 3 tabs day 4, 2 tabs day 5, 1 tab day 6. Take with food. (Patient not taking: Reported on 3/12/2024) 21 Tablet 0     No current facility-administered medications for this visit.         Physical Exam:   Gen:           Alert and oriented, No apparent distress. Mood and affect appropriate, normal interaction with examiner.  Eyes:          PERRL, EOM intact, sclere white, conjunctive moist.  Ears:          Not examined.   Hearing:     Grossly intact.  Nose:          Normal, no lesions or deformities.  Dentition:    Good dentition.  Oropharynx:   Tongue normal, posterior pharynx without erythema or exudate.  Neck:        Supple, trachea midline, no masses.  Respiratory Effort: No intercostal retractions or use of accessory muscles.   Lung Auscultation:      Clear to auscultation bilaterally; no rales, rhonchi or wheezing.  CV:            Regular rate and rhythm. No murmurs, rubs or gallops.  Abd:           Not examined.   Lymphadenopathy: Not examined.  Gait and Station: Normal.  Digits and Nails: No clubbing, cyanosis, petechiae, or nodes.   Cranial Nerves: II-XII grossly intact.  Skin:        No rashes, lesions or ulcers noted.               Ext:           No cyanosis or edema.      Assessment:  1. Asthma-COPD overlap syndrome (HCC)          Plan:  : Asymptomatic.  Continue Wixela and albuterol as needed.  Reviewed overnight pulse oximetry with patient which showed nocturnal hypoxia.  Patient also shows clustered desaturations on OPO.  Order placed for nocturnal oxygen at this time at 2 L/min through [employer].  Home sleep study also ordered as well as referral to sleep clinic for snoring and persistent daytime fatigue.  To follow-up in pulmonary clinic in 6 months and new referral placed for sleep clinic to review sleep study results.    Please note that this dictation was created using voice recognition software. I have made every  reasonable attempt to correct obvious errors, but it is possible there are errors of grammar and possibly content that I did not discover before finalizing the note.

## 2024-08-02 ENCOUNTER — HOME STUDY (OUTPATIENT)
Dept: SLEEP MEDICINE | Facility: MEDICAL CENTER | Age: 64
End: 2024-08-02
Attending: NURSE PRACTITIONER
Payer: MEDICARE

## 2024-08-02 ENCOUNTER — TELEPHONE (OUTPATIENT)
Dept: HEALTH INFORMATION MANAGEMENT | Facility: OTHER | Age: 64
End: 2024-08-02

## 2024-08-02 DIAGNOSIS — J44.89 ASTHMA-COPD OVERLAP SYNDROME (HCC): ICD-10-CM

## 2024-08-02 DIAGNOSIS — G47.30 SLEEP DISORDER BREATHING: ICD-10-CM

## 2024-08-02 DIAGNOSIS — G47.34 NOCTURNAL HYPOXIA: ICD-10-CM

## 2024-08-02 PROCEDURE — 95800 SLP STDY UNATTENDED: CPT | Performed by: STUDENT IN AN ORGANIZED HEALTH CARE EDUCATION/TRAINING PROGRAM

## 2024-08-08 NOTE — PROCEDURES
DIAGNOSTIC HOME SLEEP TEST (HST) REPORT WatchPAT      PATIENT ID:  NAME:  Sita Medina  MRN:               [id number]  YOB: 1960  DATE OF STUDY: 8/2/2024      Impression:     This study shows evidence of:      1. Moderate obstructive sleep apnea with 4% PAT apnea hypopnea index(pAHI) of 26.9 per hour.  PAT respiratory disturbance index (pRDI) was 43.2 per hour. These findings are based on 7 channels recording of PAT signal with sleep staging, heart rate, pulse oximetry, actigraphy, body position, snoring and respiratory movement.     2. Oxygenation O2 Sat. mean O2 sat was 89%,  mac was 62%,  and maximum O2 at 96 %. O2 sat was at or  below 88% for 114 min of evaluation time. Oxygen Desaturation (>=4%) Index was 26.7/hr. AVG HR was 73 BPM.      TECHNICAL DESCRIPTION: Patient underwent home sleep apnea testing with peripheral arterial tone signal (WatchPAT™). This is a Type IV portable monitor and device per Medicare. Monitoring was done with 7 channels recording of PAT signal with sleep staging, heart rate, pulse oximetry, actigraphy, body position, snoring and respiratory movement. Prior to using the device, the patient received verbal and written instructions for its application and was provided with the help desk phone number for additional telephonic instruction with 24-hour availability of qualified personnel to answer questions.    Respiratory events:      General sleep summary: . Total recording time is 8 hours and 14 minutes and total Sleep time is 7 hours and 29 minutes. The patient spent 46 minutes in the supine position and 403 minutes in the nonsupine position.      Recommendations:    1. CPAP titration study vs Auto CPAP trial.  Significant clustering of respiratory events and desaturations seen during study.  2.  Given severity of nocturnal hypoxia patient may benefit from undergoing in lab polysomnography CPAP/BiPAP titration study  3. In general patients with sleep apnea  are advised to avoid alcohol and sedatives and to not operate a motor vehicle while drowsy. In some cases alternative treatment options may prove effective in resolving sleep apnea in these options include upper airway surgery, the use of a dental orthotic or weight loss and positional therapy. Clinical correlation is required.         Quirino Dexter MD

## 2024-09-05 DIAGNOSIS — K21.9 GASTROESOPHAGEAL REFLUX DISEASE WITHOUT ESOPHAGITIS: ICD-10-CM

## 2024-09-06 ENCOUNTER — OFFICE VISIT (OUTPATIENT)
Dept: MEDICAL GROUP | Facility: MEDICAL CENTER | Age: 64
End: 2024-09-06
Payer: MEDICARE

## 2024-09-06 VITALS
WEIGHT: 157.19 LBS | BODY MASS INDEX: 23.82 KG/M2 | TEMPERATURE: 97.6 F | OXYGEN SATURATION: 94 % | HEART RATE: 78 BPM | DIASTOLIC BLOOD PRESSURE: 68 MMHG | SYSTOLIC BLOOD PRESSURE: 110 MMHG | HEIGHT: 68 IN

## 2024-09-06 DIAGNOSIS — R73.9 HYPERGLYCEMIA: Chronic | ICD-10-CM

## 2024-09-06 DIAGNOSIS — M81.0 AGE-RELATED OSTEOPOROSIS WITHOUT CURRENT PATHOLOGICAL FRACTURE: Chronic | ICD-10-CM

## 2024-09-06 DIAGNOSIS — R10.30 LOWER ABDOMINAL PAIN: ICD-10-CM

## 2024-09-06 DIAGNOSIS — G47.33 OBSTRUCTIVE SLEEP APNEA SYNDROME: ICD-10-CM

## 2024-09-06 DIAGNOSIS — E04.1 THYROID NODULE: ICD-10-CM

## 2024-09-06 DIAGNOSIS — L40.3 PSORIASIS PLANTARIS: ICD-10-CM

## 2024-09-06 DIAGNOSIS — E78.5 DYSLIPIDEMIA: Chronic | ICD-10-CM

## 2024-09-06 PROBLEM — G47.30 SLEEP APNEA: Status: ACTIVE | Noted: 2024-09-06

## 2024-09-06 PROBLEM — G47.34 NOCTURNAL HYPOXIA: Status: RESOLVED | Noted: 2024-09-06 | Resolved: 2024-09-06

## 2024-09-06 PROBLEM — G47.34 NOCTURNAL HYPOXIA: Status: ACTIVE | Noted: 2024-09-06

## 2024-09-06 PROCEDURE — 99214 OFFICE O/P EST MOD 30 MIN: CPT | Performed by: BEHAVIOR ANALYST

## 2024-09-06 PROCEDURE — 3078F DIAST BP <80 MM HG: CPT | Performed by: BEHAVIOR ANALYST

## 2024-09-06 PROCEDURE — 3074F SYST BP LT 130 MM HG: CPT | Performed by: BEHAVIOR ANALYST

## 2024-09-06 RX ORDER — BETAMETHASONE DIPROPIONATE 0.05 %
1 OINTMENT (GRAM) TOPICAL 2 TIMES DAILY
Qty: 45 G | Refills: 1 | Status: SHIPPED | OUTPATIENT
Start: 2024-09-06

## 2024-09-06 RX ORDER — FAMOTIDINE 40 MG/1
40 TABLET, FILM COATED ORAL DAILY
Qty: 100 TABLET | Refills: 3 | Status: SHIPPED | OUTPATIENT
Start: 2024-09-06

## 2024-09-06 ASSESSMENT — FIBROSIS 4 INDEX: FIB4 SCORE: 1.21

## 2024-09-06 NOTE — PROGRESS NOTES
Subjective:   Verbal consent was acquired by the patient to use [employer] ambient listening note generation during this visit Yes    CC:    Chief Complaint   Patient presents with    Follow-Up          HISTORY OF THE PRESENT ILLNESS:   Sita presents today   History of Present Illness  The patient is a 64-year-old female established patient here for follow-up.    She reports experiencing a constant tightness across her abdomen, which was previously suspected to be a hernia. She describes a sensation of tightness across her abdomen when she coughs or sits up, but not when lying down. She experienced indigestion yesterday, which she attributes to forgetting her famotidine dose. Despite taking the medication this morning, she still feels some discomfort. She reports no instances of abdominal or rectal bleeding, changes in bowel movements, diet-related issues, bloating, or gas. Her last colonoscopy was in 2020, during which a polyp was discovered she was advised to return in 10 years for repeat. She has gained weight recently.    She suspects she may have plaque psoriasis on her foot, which recurs every summer. She was once informed that she might be allergic to fragrances, which could be causing the condition. She is seeking a cream or other topical treatment for her foot. She has been using an over-the-counter product called Happy Feet to soften the skin. She reports no rashes or plaques elsewhere on her body. The affected area occasionally turns bright red and occasionally burns but does not itch. She has switched her shampoo and other products to try to alleviate the condition, but it persists. She files down the hard skin on her foot, as it tends to crack and form large fissures that bleed if left untreated. She does not consume milk.    She is seeing Pulmonology for asthma and COPD. She was put on oxygen at night because her breathing goes down to 67 at night. She had a sleep study done about 3 to 4 weeks ago,  "but the sleep study team cannot see her again until  2025. She has an appointment with Sleep Medicine Dr. Dexter on 01/09/2025. The oxygen has made a difference in her tiredness.    She is currently on alendronate, which was last refilled in February 2024. She has a thyroid nodule and a large liver cyst.. She has been advised that the liver cyst does not require further follow-up. She has an upcoming appointment with Pulmonology next week.      Current Outpatient Medications   Medication Sig    betamethasone dipropionate (DEL-BETA) 0.05 % Ointment Apply 1 Application topically 2 times a day. Use for up to 2 weeks at a time    fluticasone-salmeterol (ADVAIR) 100-50 MCG/ACT AEROSOL POWDER, BREATH ACTIVATED Inhale 1 Puff every 12 hours.    alendronate (FOSAMAX) 70 MG Tab TAKE 1 TABLET BY MOUTH ONE TIME PER WEEK    famotidine (PEPCID) 40 MG Tab TAKE 1 TABLET BY MOUTH EVERY DAY    albuterol 108 (90 Base) MCG/ACT Aero Soln inhalation aerosol Inhale 1-2 Puffs every four hours as needed for Shortness of Breath.    diclofenac sodium (VOLTAREN) 1 % Gel APPLY TO 2-4 GRAM TO AFFECTED AREA 4 TIMES DAILY          ROS: See HPI        Objective:     Exam: /68 (BP Location: Right arm, Patient Position: Sitting, BP Cuff Size: Adult)   Pulse 78   Temp 36.4 °C (97.6 °F) (Temporal)   Ht 1.727 m (5' 8\")   Wt 71.3 kg (157 lb 3 oz)   LMP 04/02/2008   SpO2 94%   BMI 23.90 kg/m²   Body mass index is 23.9 kg/m².    Physical Exam  Constitutional:       Appearance: Normal appearance.   Cardiovascular:      Rate and Rhythm: Normal rate and regular rhythm.      Pulses: Normal pulses.      Heart sounds: Normal heart sounds.   Pulmonary:      Effort: Pulmonary effort is normal.      Breath sounds: Normal breath sounds.   Abdominal:      General: There is no distension.      Palpations: There is no mass.      Tenderness: There is abdominal tenderness.   Musculoskeletal:      Cervical back: Normal range of motion and neck supple. "   Skin:     Comments: Erythematous plaques to the medial aspect of the right heel with thickened calluses.    Neurological:      Mental Status: She is alert.         Results  Imaging  Ultrasound showed a hernia.       Assessment & Plan:     64 y.o. female with the following -     Assessment & Plan      Follow-up  She will follow up in 2 months.    1. Obstructive sleep apnea syndrome  -Recently diagnosed with sleep study.  Continue management with pulmonary.  If needing to wait 4 months to get started on CPAP she will message me for referral to outside clinic who can see her much sooner.    2. Age-related osteoporosis without current pathological fracture  -Chronic, treated.  Doing well on alendronate.  Continue alendronate 75 mg weekly.A repeat bone density test will be scheduled for next year.   - VITAMIN D,25 HYDROXY (DEFICIENCY); Future    3. Lower abdominal pain  -Chronic problem, persistent.  Hernia was ruled out.  Rule out pelvic organ mass or abnormalities.   - US-PELVIC COMPLETE (TRANSABDOMINAL/TRANSVAGINAL) (COMBO); Future    4. Thyroid nodule  - TSH WITH REFLEX TO FT4; Future  - US-THYROID; Future    5. Dyslipidemia  - Lipid Profile; Future  - Comp Metabolic Panel; Future      7. Psoriasis plantaris  -Newly reported problem but chronic.  -Start steroid ointment.  Counseled patient that an illumination diet may be helpful.  Consider referral to dermatology if no improvement.  - betamethasone dipropionate (DEL-BETA) 0.05 % Ointment; Apply 1 Application topically 2 times a day. Use for up to 2 weeks at a time  Dispense: 45 g; Refill: 1              Return in about 2 months (around 11/6/2024) for SCP physical exam.    Please note that this dictation was created using voice recognition software. I have made every reasonable attempt to correct obvious errors, but I expect that there are errors of grammar and possibly content that I did not discover before finalizing the note.

## 2024-09-06 NOTE — TELEPHONE ENCOUNTER
Was the patient seen in the last year in this department? Yes   Does patient have an active prescription for medications requested? No   Received Request Via: Pharmacy  Sent pt a mess to schedule a fv tony

## 2024-09-12 ENCOUNTER — OFFICE VISIT (OUTPATIENT)
Dept: SLEEP MEDICINE | Facility: MEDICAL CENTER | Age: 64
End: 2024-09-12
Payer: MEDICARE

## 2024-09-12 VITALS
HEART RATE: 71 BPM | HEIGHT: 68 IN | SYSTOLIC BLOOD PRESSURE: 116 MMHG | OXYGEN SATURATION: 94 % | BODY MASS INDEX: 23.49 KG/M2 | WEIGHT: 155 LBS | DIASTOLIC BLOOD PRESSURE: 68 MMHG

## 2024-09-12 DIAGNOSIS — G47.33 OSA (OBSTRUCTIVE SLEEP APNEA): ICD-10-CM

## 2024-09-12 PROCEDURE — 3074F SYST BP LT 130 MM HG: CPT

## 2024-09-12 PROCEDURE — 99212 OFFICE O/P EST SF 10 MIN: CPT

## 2024-09-12 PROCEDURE — 3078F DIAST BP <80 MM HG: CPT

## 2024-09-12 PROCEDURE — 99213 OFFICE O/P EST LOW 20 MIN: CPT

## 2024-09-12 ASSESSMENT — FIBROSIS 4 INDEX: FIB4 SCORE: 1.21

## 2024-09-12 NOTE — ASSESSMENT & PLAN NOTE
Sleep Apnea:    The pathophysiology of sleep anea and the increased risk of cardiovascular morbidity from untreated sleep apnea is discussed in detail with the patient.  Urged to avoid supine sleep, weight gain and alcoholic beverages since all of these can worsen sleep apnea. Cautioned against drowsy driving. If feeling sleepy while driving, pull over for a break/nap, rather than persist on the road, in the interest of own safety and that of others on the road.  The risks of untreated sleep apnea were discussed with the patient at length. Patients with sleep apnea are at increased risk of cardiovascular disease including coronary artery disease, systemic arterial hypertension, pulmonary arterial hypertension, cardiac arrythmias, and stroke.  Positive airway pressure will favorably impact many of the adverse conditions and effects provoked by sleep apnea.    Plan:    HSS was reviewed in depth the patient.  We spent significant time reviewing sleep apnea, risk of untreated sleep apnea, different treatment options, and process of undergoing a sleep study.    -- PSG CPAP/BiPAP    Advised patient to reach out via MyChart if any questions or concerns should arise.

## 2024-09-12 NOTE — PROGRESS NOTES
[facility] Follow-up Visit    Date of Visit: 9/12/2024     CC:  Follow-up for ELEUTERIO management      HPI:  Sita Medina is a very pleasant 64 y.o. year old female former smoker (22.5 pack-years, quit in 2019), with a PMHx of asthma-COPD overlap syndrome, GERD, depression, ELEUTERIO, SVT who [facility] the [facility] for a follow up. Last seen in the office on 6/6/2024 with DOUGLAS Mendoza.      Patient presents for sleep study results.  Patient states her daytime sleepiness has decreased due to the use of 2 LPM of oxygen at night.  And she will have an occasional dry mouth.  She denies any significant morning headaches presents for driving, issues falling asleep, snoring, gasping, apneas, palpitations, dry mouth.  Patient will sleep 6.5 hours at night and will not have any awakenings and does not nap.    Sleep History:  HSS 8/2/2024-        Patient Active Problem List    Diagnosis Date Noted    ELEUTERIO (obstructive sleep apnea) 09/06/2024    Lower abdominal pain 09/06/2024    Thyroid nodule 09/06/2024    Psoriasis plantaris 09/06/2024    SVT (supraventricular tachycardia) ([facility]) 03/04/2024    COPD (chronic obstructive pulmonary disease) ([facility]) 01/18/2024    Major depressive disorder with single episode, in full remission ([facility]) 08/28/2023    Personal history of tobacco use 07/26/2023    Atherosclerosis of aorta ([facility]) 04/25/2023    Hyperglycemia 02/27/2023    Dyslipidemia 03/01/2022    History of thyroid nodule 08/13/2021    GERD (gastroesophageal reflux disease) 05/04/2021    Onychomycosis of toenail 04/14/2021    Liver cyst 01/22/2021    Asthma-COPD overlap syndrome (HCC) 10/04/2010    Osteoporosis 10/04/2010     Past Medical History:   Diagnosis Date    ASTHMA     COPD (chronic obstructive pulmonary disease) (HCC) 10/04/2010    COPD (chronic obstructive pulmonary disease) ([facility]) 10/04/2010    COVID-19 virus infection 01/03/2023    Went to ER on 12/25 for SOB and she thought was an asthma attack. Work  up was unremarkable except for positive for COVID infection. She was given nebulizer treatment which was very helpful and sent home with Paxlovid which she took as prescribed until the  with no side effects. Also prescibed 40mg prednisone which she took for 2 days total and did not take 3rd dose due to feeling well.  She fe    Eczema 10/04/2010    Moderate episode of recurrent major depressive disorder (HCC) 2023    Onychomycosis of toenail 2021    Osteopenia 10/04/2010    Recurrent acute sinusitis     Shortness of breath     Wheezing       No past surgical history on file.  Family History   Problem Relation Age of Onset    Cancer Mother         breast    Allergies Mother     Diabetes Father     Cancer Brother         esophageal cancer     Social History     Socioeconomic History    Marital status:      Spouse name: Not on file    Number of children: Not on file    Years of education: Not on file    Highest education level: Some college, no degree   Occupational History    Not on file   Tobacco Use    Smoking status: Former     Current packs/day: 0.00     Average packs/day: 0.8 packs/day for 30.0 years (22.5 ttl pk-yrs)     Types: Cigarettes     Start date: 1989     Quit date: 2019     Years since quittin.5    Smokeless tobacco: Never   Vaping Use    Vaping status: Never Used   Substance and Sexual Activity    Alcohol use: Never    Drug use: No    Sexual activity: Not Currently     Partners: Male   Other Topics Concern    Not on file   Social History Narrative    Not on file     Social Determinants of Health     Financial Resource Strain: Low Risk  (2024)    Overall Financial Resource Strain (CARDIA)     Difficulty of Paying Living Expenses: Not hard at all   Food Insecurity: No Food Insecurity (2024)    Hunger Vital Sign     Worried About Running Out of Food in the Last Year: Never true     Ran Out of Food in the Last Year: Never true   Transportation Needs: No  Transportation Needs (1/18/2024)    PRAPARE - Transportation     Lack of Transportation (Medical): No     Lack of Transportation (Non-Medical): No   Physical Activity: Sufficiently Active (11/19/2022)    Exercise Vital Sign     Days of Exercise per Week: 3 days     Minutes of Exercise per Session: 60 min   Stress: No Stress Concern Present (11/19/2022)    [employer] [place] [employer] - Occupational Stress Questionnaire     Feeling of Stress : Not at all   Social Connections: Moderately Integrated (11/19/2022)    Social Connection and Isolation Panel [NHANES]     Frequency of Communication with Friends and Family: More than three times a week     Frequency of Social Gatherings with Friends and Family: Three times a week     Attends Protestant Services: Never     Active Member of Clubs or Organizations: Yes     Attends Club or Organization Meetings: More than 4 times per year     Marital Status:    Intimate Partner Violence: Not on file   Housing Stability: Unknown (11/19/2022)    Housing Stability Vital Sign     Unable to Pay for Housing in the Last Year: Patient refused     Number of Places Lived in the Last Year: Not on file     Unstable Housing in the Last Year: Patient refused     Current Outpatient Medications   Medication Sig Dispense Refill    famotidine (PEPCID) 40 MG Tab TAKE 1 TABLET BY MOUTH EVERY  Tablet 3    betamethasone dipropionate (DEL-BETA) 0.05 % Ointment Apply 1 Application topically 2 times a day. Use for up to 2 weeks at a time 45 g 1    fluticasone-salmeterol (ADVAIR) 100-50 MCG/ACT AEROSOL POWDER, BREATH ACTIVATED Inhale 1 Puff every 12 hours. 3 Each 3    alendronate (FOSAMAX) 70 MG Tab TAKE 1 TABLET BY MOUTH ONE TIME PER WEEK 12 Tablet 3    albuterol 108 (90 Base) MCG/ACT Aero Soln inhalation aerosol Inhale 1-2 Puffs every four hours as needed for Shortness of Breath. 1 Each 2     No current facility-administered medications for this visit.      ALLERGIES: Cefzil  "[cefprozil]    ROS:  Constitutional: Denies fever, chills, sweats,  weight loss, fatigue  Cardiovascular: Denies chest pain, tightness, palpitations, swelling in legs/feet  Respiratory: Denies shortness of breath, cough, sputum, wheezing, painful breathing   Sleep: per HPI  Gastrointestinal: Denies  difficulty swallowing, nausea, abdominal pain, diarrhea, constipation, heartburn.  Musculoskeletal: Denies painful joints, sore muscles,       PHYSICAL EXAM:  /68 (BP Location: Right arm, Patient Position: Sitting, BP Cuff Size: Adult)   Pulse 71   Ht 1.727 m (5' 8\")   Wt 70.3 kg (155 lb)   LMP 04/02/2008   SpO2 94%   BMI 23.57 kg/m²   Appearance: Well-nourished, well-developed, no acute distress  Eyes:  No scleral icterus , EOMI  ENMT: No redness of the oropharynx  Lung auscultation:  No wheezes rhonchi rubs or rales  Cardiac: No murmurs, rubs, or gallops; regular rhythm, normal rate; no edema  Musculoskeletal:  Grossly normal; gait and station normal; digits and nails normal  Skin:  No rashes, petechiae, cyanosis  Neurologic: without focal signs; oriented to person, time, place, and purpose; judgement intact  Psychiatric:  No depression, anxiety, agitation  Mallampati score: Class II    Assessment and Plan:    The medical record was reviewed.    Diagnostic and titration nocturnal polysomnogram's, home sleep apnea tests, continuous nocturnal oximetry results, multiple sleep latency tests, and compliance reports reviewed.    Problem List Items Addressed This Visit          Pulmonary/Sleep Medicine Problems    ELEUTERIO (obstructive sleep apnea)     Sleep Apnea:    The pathophysiology of sleep anea and the increased risk of cardiovascular morbidity from untreated sleep apnea is discussed in detail with the patient.  Urged to avoid supine sleep, weight gain and alcoholic beverages since all of these can worsen sleep apnea. Cautioned against drowsy driving. If feeling sleepy while driving, pull over for a break/nap, " rather than persist on the road, in the interest of own safety and that of others on the road.  The risks of untreated sleep apnea were discussed with the patient at length. Patients with sleep apnea are at increased risk of cardiovascular disease including coronary artery disease, systemic arterial hypertension, pulmonary arterial hypertension, cardiac arrythmias, and stroke.  Positive airway pressure will favorably impact many of the adverse conditions and effects provoked by sleep apnea.    Plan:    HSS was reviewed in depth the patient.  We spent significant time reviewing sleep apnea, risk of untreated sleep apnea, different treatment options, and process of undergoing a sleep study.    -- PSG CPAP/BiPAP    Advised patient to reach out via [employer] if any questions or concerns should arise.              Relevant Orders    Polysomnography Titration     Have advised the patient to follow up with the appropriate healthcare practitioners for all other medical problems and issues.    Return in about 3 weeks (around 10/3/2024), or if symptoms worsen or fail to improve, for PSG/SS results, with Linda or anyone.    Please note portions of this record was created using voice recognition software. I have made every reasonable attempt to correct obvious errors, but I expect that there are errors of grammar and possibly content I did not discover before finalizing the note.    Time spent in record review prior to patient arrival, reviewing results, and in face-to-face encounter totaled 20 min.  __________  DOUGLAS Mckeon  Pulmonary & Sleep Medicine  [facility]      <--- Click to Launch [employer] for PreOp, PostOp and Procedure

## 2024-09-27 ENCOUNTER — SLEEP STUDY (OUTPATIENT)
Dept: SLEEP MEDICINE | Facility: MEDICAL CENTER | Age: 64
End: 2024-09-27
Payer: MEDICARE

## 2024-09-27 DIAGNOSIS — G47.33 OSA (OBSTRUCTIVE SLEEP APNEA): ICD-10-CM

## 2024-10-01 ENCOUNTER — HOSPITAL ENCOUNTER (OUTPATIENT)
Dept: RADIOLOGY | Facility: MEDICAL CENTER | Age: 64
End: 2024-10-01
Attending: BEHAVIOR ANALYST
Payer: MEDICARE

## 2024-10-01 DIAGNOSIS — E04.1 THYROID NODULE: ICD-10-CM

## 2024-10-01 DIAGNOSIS — R10.30 LOWER ABDOMINAL PAIN: ICD-10-CM

## 2024-10-01 PROCEDURE — 76830 TRANSVAGINAL US NON-OB: CPT

## 2024-10-01 PROCEDURE — 76536 US EXAM OF HEAD AND NECK: CPT

## 2024-11-12 ENCOUNTER — OFFICE VISIT (OUTPATIENT)
Dept: SLEEP MEDICINE | Facility: MEDICAL CENTER | Age: 64
End: 2024-11-12
Attending: NURSE PRACTITIONER
Payer: MEDICARE

## 2024-11-12 VITALS
BODY MASS INDEX: 23.49 KG/M2 | HEART RATE: 87 BPM | DIASTOLIC BLOOD PRESSURE: 72 MMHG | HEIGHT: 68 IN | SYSTOLIC BLOOD PRESSURE: 118 MMHG | OXYGEN SATURATION: 94 % | WEIGHT: 155 LBS

## 2024-11-12 DIAGNOSIS — G47.33 OSA (OBSTRUCTIVE SLEEP APNEA): ICD-10-CM

## 2024-11-12 DIAGNOSIS — G47.34 NOCTURNAL HYPOXIA: ICD-10-CM

## 2024-11-12 PROCEDURE — 3074F SYST BP LT 130 MM HG: CPT | Performed by: NURSE PRACTITIONER

## 2024-11-12 PROCEDURE — 99214 OFFICE O/P EST MOD 30 MIN: CPT | Performed by: NURSE PRACTITIONER

## 2024-11-12 PROCEDURE — 3078F DIAST BP <80 MM HG: CPT | Performed by: NURSE PRACTITIONER

## 2024-11-12 PROCEDURE — 99211 OFF/OP EST MAY X REQ PHY/QHP: CPT | Performed by: NURSE PRACTITIONER

## 2024-11-12 ASSESSMENT — FIBROSIS 4 INDEX: FIB4 SCORE: 1.21

## 2024-11-12 NOTE — PROGRESS NOTES
Chief Complaint   Patient presents with    Follow-Up     Last seen 9/12/24 DOUGLAS [place]       HPI:  Sita Medina is a 64 y.o. year old female here today for follow-up on ELEUTERIO follow-up.  Last seen 9/12/2024 for sleep study results.  Patient states her daytime sleepiness has decreased due to the use of 2 LPM of oxygen at night. And she will have an occasional dry mouth. She denies any significant morning headaches presents for driving, issues falling asleep, snoring, gasping, apneas, palpitations, dry mouth. Patient will sleep 6.5 hours at night and will not have any awakenings and does not nap.     HST was completed on 8/2/2024 which showed evidence of moderate ELEUTERIO with an AHI of 26.9 and O2 mac of 82%.  O2 was less than or equal to 88% 414 minutes of evaluation time.    An order was placed and patient was recommended for titration study.  Patient had difficulty initiating sleep or feeling comfortable on titration therapy.  Patient had to cancel titration study and leave.  She was emotionally distraught due to imperceived understanding of testing.  Patient denies any symptoms currently with only supplemental oxygen used at 2 L/min.  Denies any excessive daytime sleepiness, morning headaches, palpitations, concentration or memory problems.    ROS: As per HPI and otherwise negative if not stated.    Past Medical History:   Diagnosis Date    ASTHMA     COPD (chronic obstructive pulmonary disease) ([facility]) 10/04/2010    COPD (chronic obstructive pulmonary disease) ([facility]) 10/04/2010    COVID-19 virus infection 01/03/2023    Went to ER on 12/25 for SOB and she thought was an asthma attack. Work up was unremarkable except for positive for COVID infection. She was given nebulizer treatment which was very helpful and sent home with Paxlovid which she took as prescribed until the 31st with no side effects. Also prescibed 40mg prednisone which she took for 2 days total and did not take 3rd dose due to feeling well.   "She fe    Eczema 10/04/2010    Moderate episode of recurrent major depressive disorder (HCC) 08/28/2023    Onychomycosis of toenail 04/14/2021    Osteopenia 10/04/2010    Recurrent acute sinusitis     Shortness of breath     Wheezing        No past surgical history on file.    Family History   Problem Relation Age of Onset    Cancer Mother         breast    Allergies Mother     Diabetes Father     Cancer Brother         esophageal cancer       Allergies as of 11/12/2024 - Reviewed 11/12/2024   Allergen Reaction Noted    Cefzil [cefprozil]  04/19/2017        Vitals:  /72 (BP Location: Right arm, Patient Position: Sitting, BP Cuff Size: Adult)   Pulse 87   Ht 1.727 m (5' 8\")   Wt 70.3 kg (155 lb)   SpO2 94%     Current medications as of today   Current Outpatient Medications   Medication Sig Dispense Refill    famotidine (PEPCID) 40 MG Tab TAKE 1 TABLET BY MOUTH EVERY  Tablet 3    fluticasone-salmeterol (ADVAIR) 100-50 MCG/ACT AEROSOL POWDER, BREATH ACTIVATED Inhale 1 Puff every 12 hours. 3 Each 3    alendronate (FOSAMAX) 70 MG Tab TAKE 1 TABLET BY MOUTH ONE TIME PER WEEK 12 Tablet 3    albuterol 108 (90 Base) MCG/ACT Aero Soln inhalation aerosol Inhale 1-2 Puffs every four hours as needed for Shortness of Breath. 1 Each 2    betamethasone dipropionate (DEL-BETA) 0.05 % Ointment Apply 1 Application topically 2 times a day. Use for up to 2 weeks at a time 45 g 1     No current facility-administered medications for this visit.         Physical Exam:   Gen:           Alert and oriented, No apparent distress. Mood and affect appropriate, normal interaction with examiner.  Eyes:          PERRL, EOM intact, sclere white, conjunctive moist.  Ears:          Not examined.   Hearing:     Grossly intact.  Nose:          Normal, no lesions or deformities.  Dentition:    Good dentition.  Oropharynx:   Tongue normal, posterior pharynx without erythema or exudate.  Neck:        Supple, trachea midline, no " masses.  Respiratory Effort: No intercostal retractions or use of accessory muscles.   Lung Auscultation:      Clear to auscultation bilaterally; no rales, rhonchi or wheezing.  CV:            Regular rate and rhythm. No murmurs, rubs or gallops.  Abd:           Not examined.   Lymphadenopathy: Not examined.  Gait and Station: Normal.  Digits and Nails: No clubbing, cyanosis, petechiae, or nodes.   Cranial Nerves: II-XII grossly intact.  Skin:        No rashes, lesions or ulcers noted.               Ext:           No cyanosis or edema.      Assessment:  1. ELEUTERIO (obstructive sleep apnea)  Overnight Oximetry      2. Nocturnal hypoxia  Overnight Oximetry          Plan:  Unable to tolerate titration study.  Did not understand that she was being titrated, simply patient thought she was being retested.  Will increase O2 liters to 3 L/min and check overnight pulse oximetry.  Depending on results, can recommend an empiric titration on auto CPAP with O2 bleed in.  Results will be discussed upon OPO test results.    Please note that this dictation was created using voice recognition software. I have made every reasonable attempt to correct obvious errors, but it is possible there are errors of grammar and possibly content that I did not discover before finalizing the note.

## 2024-12-10 ENCOUNTER — APPOINTMENT (OUTPATIENT)
Dept: SLEEP MEDICINE | Facility: MEDICAL CENTER | Age: 64
End: 2024-12-10
Attending: NURSE PRACTITIONER
Payer: MEDICARE

## 2024-12-10 DIAGNOSIS — G47.34 NOCTURNAL HYPOXIA: ICD-10-CM

## 2024-12-10 DIAGNOSIS — G47.33 OSA (OBSTRUCTIVE SLEEP APNEA): ICD-10-CM

## 2024-12-12 ENCOUNTER — HOME STUDY (OUTPATIENT)
Dept: SLEEP MEDICINE | Facility: MEDICAL CENTER | Age: 64
End: 2024-12-12
Attending: NURSE PRACTITIONER
Payer: MEDICARE

## 2024-12-12 DIAGNOSIS — G47.34 NOCTURNAL HYPOXIA: ICD-10-CM

## 2024-12-12 DIAGNOSIS — G47.33 OSA (OBSTRUCTIVE SLEEP APNEA): ICD-10-CM

## 2024-12-12 PROCEDURE — 94762 N-INVAS EAR/PLS OXIMTRY CONT: CPT | Performed by: STUDENT IN AN ORGANIZED HEALTH CARE EDUCATION/TRAINING PROGRAM

## 2024-12-15 NOTE — PROCEDURES
This is an overnight oximetry study performed on December 12, 2024 for duration of 8 hours and 41 minutes on 2 L O2 nasal cannula.     Basal SpO2 was 96.9%.  Total time spent below saturation of 88% was 9.1 minutes. There are a few areas of clustered desaturations. Treatment appears inadequate as time spent below 88% SpO2 was greater than 5 minutes. Consider formal polysomnogram testing.

## 2025-01-09 ENCOUNTER — OFFICE VISIT (OUTPATIENT)
Dept: SLEEP MEDICINE | Facility: MEDICAL CENTER | Age: 65
End: 2025-01-09
Attending: NURSE PRACTITIONER
Payer: MEDICARE

## 2025-01-09 VITALS
BODY MASS INDEX: 23.79 KG/M2 | HEART RATE: 73 BPM | RESPIRATION RATE: 16 BRPM | WEIGHT: 157 LBS | DIASTOLIC BLOOD PRESSURE: 62 MMHG | HEIGHT: 68 IN | OXYGEN SATURATION: 97 % | SYSTOLIC BLOOD PRESSURE: 104 MMHG

## 2025-01-09 DIAGNOSIS — J44.9 CHRONIC OBSTRUCTIVE PULMONARY DISEASE, UNSPECIFIED COPD TYPE (HCC): ICD-10-CM

## 2025-01-09 DIAGNOSIS — J44.89 ASTHMA-COPD OVERLAP SYNDROME (HCC): ICD-10-CM

## 2025-01-09 DIAGNOSIS — G47.30 SLEEP DISORDER BREATHING: ICD-10-CM

## 2025-01-09 DIAGNOSIS — G47.34 NOCTURNAL HYPOXIA: ICD-10-CM

## 2025-01-09 PROCEDURE — 99214 OFFICE O/P EST MOD 30 MIN: CPT | Performed by: STUDENT IN AN ORGANIZED HEALTH CARE EDUCATION/TRAINING PROGRAM

## 2025-01-09 PROCEDURE — 3078F DIAST BP <80 MM HG: CPT | Performed by: STUDENT IN AN ORGANIZED HEALTH CARE EDUCATION/TRAINING PROGRAM

## 2025-01-09 PROCEDURE — 3074F SYST BP LT 130 MM HG: CPT | Performed by: STUDENT IN AN ORGANIZED HEALTH CARE EDUCATION/TRAINING PROGRAM

## 2025-01-09 PROCEDURE — 99212 OFFICE O/P EST SF 10 MIN: CPT | Performed by: NURSE PRACTITIONER

## 2025-01-09 ASSESSMENT — FIBROSIS 4 INDEX: FIB4 SCORE: 1.21

## 2025-01-09 NOTE — PROGRESS NOTES
[facility] Consult Note     Date: 1/9/2025 / Time: 9:27 AM      Thank you for requesting a sleep medicine consultation on Sita Medina at the sleep center. Presents today with the chief complaints of snoring, and discussing management of obstructive sleep apnea. She is referred by GIOVANNI Silva  [address] [place] [address],  [place] [id number] for evaluation and treatment of obstructive sleep apnea.     HISTORY OF PRESENT ILLNESS:     Sita Medina is a 64 y.o. female with asthma, COPD, nocturnal hypoxia on supplemental oxygen, moderate obstructive sleep apnea..  Presents to Sleep Clinic for evaluation of sleep.     She reports currently using 3 L/min of supplemental oxygen at night.  She states she is able to use her oxygen consistently.  She does wear a Fitbit at night which monitors her oxygen levels.  She reports that they tend to range in the mid 90s.  Reports recently traveling to [place] and did not take her oxygen concentrator on that trip.  During that time her oxygen level maintained in the low to mid 90s but did not drop drastically.    She follows with pulmonology who is managing her chronic respiratory comorbidities.    She did complete a home sleep study last year which showed moderate obstructive sleep apnea with nocturnal hypoxia.  She presented in September to the sleep lab for a titration study but was unable to tolerate PAP therapy at that time.  During the initial mask fit trying low PAP pressure she found it difficult to breathe and ended the study and went home.  She states she is not interested in PAP therapy at this time and prefers to proceed with using supplemental oxygen at night.    She has been told that she snores in her sleep.  Denies any choking or gasping or witnessed apneas.  Denies any daytime sleepiness or brain fog.  She does find her sleep restorative.    As per supplemental questionnaire to be scanned or imported into chart:    [place]  "Sleepiness Score: 7    Sleep Schedule  Bedtime: Weekday & Weekend 1130pm   Wake time: Weekday & Weekend 7am   Sleep-onset latency: 5 min   Awakenings from sleep: 3   Difficulty falling back asleep: no  Bedroom partner: yes   Naps: No     DAYTIME SYMPTOMS:   Excessive daytime sleepiness: No   Daytime fatigue: No   Difficulty concentrating: No   Memory problems: No   Irritability:No   Work/school performance issues: retired,   Sleepiness with driving: No   Caffeine/stimulant use: Yes  Alcohol use:No     SLEEP RELATED SYMPTOMS  Snoring: Yes  Witnessed apnea or gasping/choking: No   Dry mouth or mouth breathing: No   Sweating: No   Teeth grinding/biting: No   Morning headaches: No   Refreshed Upon Awakening: Yes     SLEEP RELATED BEHAVIORS:  Parasomnias (walking, talking, eating, violence): No   Leg kicking: No   Restless legs - \"urge to move\": No   Nightmares: No  Recurrent: No   Dream enactment: No      NARCOLEPSY:  Cataplexy: No   Sleep paralysis: No   Sleep attacks: No   Hypnagogic/hypnopompic hallucinations: No     MEDICAL HISTORY  Past Medical History:   Diagnosis Date    ASTHMA     COPD (chronic obstructive pulmonary disease) ([facility]) 10/04/2010    COPD (chronic obstructive pulmonary disease) ([facility]) 10/04/2010    COVID-19 virus infection 01/03/2023    Went to ER on 12/25 for SOB and she thought was an asthma attack. Work up was unremarkable except for positive for COVID infection. She was given nebulizer treatment which was very helpful and sent home with Paxlovid which she took as prescribed until the 31st with no side effects. Also prescibed 40mg prednisone which she took for 2 days total and did not take 3rd dose due to feeling well.  She fe    Eczema 10/04/2010    Moderate episode of recurrent major depressive disorder ([facility]) 08/28/2023    Onychomycosis of toenail 04/14/2021    Osteopenia 10/04/2010    Recurrent acute sinusitis     Shortness of breath     Snoring     Wheezing         SURGICAL HISTORY  No past " surgical history on file.     FAMILY HISTORY  Family History   Problem Relation Age of Onset    Cancer Mother         breast    Allergies Mother     Diabetes Father     Cancer Brother         esophageal cancer       SOCIAL HISTORY  Social History     Socioeconomic History    Marital status:     Highest education level: Some college, no degree   Tobacco Use    Smoking status: Former     Current packs/day: 0.00     Average packs/day: 0.8 packs/day for 30.0 years (22.5 ttl pk-yrs)     Types: Cigarettes     Start date: 1989     Quit date: 2019     Years since quittin.9    Smokeless tobacco: Never   Vaping Use    Vaping status: Never Used   Substance and Sexual Activity    Alcohol use: Never    Drug use: No    Sexual activity: Not Currently     Partners: Male     Social Drivers of Health     Financial Resource Strain: Low Risk  (2024)    Overall Financial Resource Strain (CARDIA)     Difficulty of Paying Living Expenses: Not hard at all   Food Insecurity: No Food Insecurity (2024)    Hunger Vital Sign     Worried About Running Out of Food in the Last Year: Never true     Ran Out of Food in the Last Year: Never true   Transportation Needs: No Transportation Needs (2024)    PRAPARE - Transportation     Lack of Transportation (Medical): No     Lack of Transportation (Non-Medical): No   Physical Activity: Sufficiently Active (2022)    Exercise Vital Sign     Days of Exercise per Week: 3 days     Minutes of Exercise per Session: 60 min   Stress: No Stress Concern Present (2022)    [employer] [place] [employer] - Occupational Stress Questionnaire     Feeling of Stress : Not at all   Social Connections: Moderately Integrated (2022)    Social Connection and Isolation Panel [NHANES]     Frequency of Communication with Friends and Family: More than three times a week     Frequency of Social Gatherings with Friends and Family: Three times a week     Attends Shinto  "Services: Never     Active Member of Clubs or Organizations: Yes     Attends Club or Organization Meetings: More than 4 times per year     Marital Status:    Housing Stability: Unknown (11/19/2022)    Housing Stability Vital Sign     Unable to Pay for Housing in the Last Year: Patient refused     Unstable Housing in the Last Year: Patient refused        Occupation: Retired     CURRENT MEDICATIONS  Current Outpatient Medications   Medication Sig Dispense Refill    famotidine (PEPCID) 40 MG Tab TAKE 1 TABLET BY MOUTH EVERY  Tablet 3    fluticasone-salmeterol (ADVAIR) 100-50 MCG/ACT AEROSOL POWDER, BREATH ACTIVATED Inhale 1 Puff every 12 hours. 3 Each 3    alendronate (FOSAMAX) 70 MG Tab TAKE 1 TABLET BY MOUTH ONE TIME PER WEEK 12 Tablet 3    albuterol 108 (90 Base) MCG/ACT Aero Soln inhalation aerosol Inhale 1-2 Puffs every four hours as needed for Shortness of Breath. 1 Each 2    betamethasone dipropionate (DEL-BETA) 0.05 % Ointment Apply 1 Application topically 2 times a day. Use for up to 2 weeks at a time 45 g 1     No current facility-administered medications for this visit.       REVIEW OF SYSTEMS  Constitutional: Denies fevers, Denies weight changes  Ears/Nose/Throat/Mouth: Denies nasal congestion or sore throat   Cardiovascular: Denies chest pain  Respiratory: Denies shortness of breath, Denies cough  Gastrointestinal/Hepatic: Denies nausea, vomiting  Sleep: see HPI    Physical Examination:  Vitals/ General Appearance:   Weight/BMI: Body mass index is 23.87 kg/m².  /62 (BP Location: Left arm, Patient Position: Sitting, BP Cuff Size: Adult)   Pulse 73   Resp 16   Ht 1.727 m (5' 8\")   Wt 71.2 kg (157 lb)   SpO2 97%   Vitals:    01/09/25 0930   BP: 104/62   BP Location: Left arm   Patient Position: Sitting   BP Cuff Size: Adult   Pulse: 73   Resp: 16   SpO2: 97%   Weight: 71.2 kg (157 lb)   Height: 1.727 m (5' 8\")       Pt. is alert and oriented to time, place and person. Cooperative and " in no apparent distress.     Constitutional: Alert, no distress, well-groomed.  Skin: No rashes in visible areas.  Eye: Round. Conjunctiva clear, lids normal. No icterus.   ENT EXAM  Nasal alae/valves collapsible: No   Nasal septum deviation: Yes  Nasal turbinate hypertrophy: No  Hard palate narrow: yes  Hard palate high: No   Soft palate/uvula (Mallampati score): 4  Tongue Scalloping: No   Retrognathia: No   Micrognathia: No   Cardiovascular:no murmus/gallops/rubs, normal S1 and S2 heart sounds, regular rate and rhythm  Pulmonary:Clear to auscultation, No wheezes, No crackles.  Neurologic:Awake, alert and oriented x 3, Normal age appropriate gait, No involuntary motions.  Extremities: No clubbing, cyanosis, or edema       ASSESSMENT AND PLAN   Sita Medina is a 64 y.o. female with asthma, COPD, nocturnal hypoxia on supplemental oxygen, moderate obstructive sleep apnea..  Presents to Sleep Clinic for evaluation of sleep.     1.  Obstructive sleep apnea  Sita Medina  has Obstructive Sleep Apnea (ELEUTERIO). Sita Medina has symptoms of snoring.     Pt has risk factors for ELEUTERIO include age and crowded oropharynx.     The pathophysiology of ELEUTERIO and the increased risk of cardiovascular morbidity from untreated ELEUTERIO is discussed in detail with the patient. She  also has COPD, Asthma, nocturnal hypoxia which can be worsened by ELEUTERIO.    Reviewed previous HST with patient showing an 4% AHI of 27,  and Min Oxygen saturation of 62%.  Time at or below 88% saturation 114 minutes  Based on sleep study and symptoms meets criteria for Moderate obstructive sleep apnea.   We discussed the pathophysiology of obstructive sleep apnea (ELEUTERIO) and risk factors for the disease. We also discussed possible consequences of untreated ELEUTERIO, including excessive daytime sleepiness and fatigue, cognitive dysfunction, cardiovascular complications such as elevated blood pressure, heart attacks, cardiac arrhythmias, and strokes.  We discussed how ELEUTERIO typically gets worse with age. We discussed treatment options for ELEUTERIO, including the gold standard therapy (PAP), alternative options such as a mandibular advancement device (custom-made oral appliances) and surgeries.     Reviewed that supplemental oxygen alone is generally not a treatment for obstructive sleep apnea.  Recommend patient consider other modalities with treating sleep apnea given her comorbidities.  She like to hold off at this time and prefers to continue supplemental oxygen at night.    RECOMMENDATIONS  -Continue supplemental oxygen 3 L/min  -Patient counseled to avoid driving when sleepy. Encouraged to anticipate sleepiness, consider taking a 10 min nap prior to driving, alternate with another , or pull over if sleepy while driving       2.  COPD  Chronic, stable. Continue with current defined treatment plan: With pulmonology. Follow-up at least annually.    3.  Asthma COPD overlap syndrome  Chronic, stable. Continue with current defined treatment plan: With pulmonology. Follow-up at least annually.    Please note portions of this record was created using voice recognition software. I have made every reasonable attempt to correct obvious errors, but I expect that there are errors of grammar and possibly content I did not discover before finalizing the note.

## 2025-01-13 ENCOUNTER — APPOINTMENT (OUTPATIENT)
Dept: MEDICAL GROUP | Facility: MEDICAL CENTER | Age: 65
End: 2025-01-13
Payer: MEDICARE

## 2025-01-13 VITALS
WEIGHT: 160 LBS | HEIGHT: 68 IN | BODY MASS INDEX: 24.25 KG/M2 | HEART RATE: 63 BPM | OXYGEN SATURATION: 98 % | DIASTOLIC BLOOD PRESSURE: 72 MMHG | SYSTOLIC BLOOD PRESSURE: 124 MMHG | TEMPERATURE: 97.9 F

## 2025-01-13 DIAGNOSIS — R73.9 HYPERGLYCEMIA: Chronic | ICD-10-CM

## 2025-01-13 DIAGNOSIS — Z78.0 POSTMENOPAUSAL: ICD-10-CM

## 2025-01-13 DIAGNOSIS — E04.1 THYROID NODULE: ICD-10-CM

## 2025-01-13 DIAGNOSIS — Z00.00 WELLNESS EXAMINATION: ICD-10-CM

## 2025-01-13 DIAGNOSIS — M81.0 AGE-RELATED OSTEOPOROSIS WITHOUT CURRENT PATHOLOGICAL FRACTURE: Chronic | ICD-10-CM

## 2025-01-13 DIAGNOSIS — K76.89 HEPATIC CYST: ICD-10-CM

## 2025-01-13 DIAGNOSIS — D21.9 LEIOMYOMA: ICD-10-CM

## 2025-01-13 DIAGNOSIS — R10.2 PELVIC PAIN: ICD-10-CM

## 2025-01-13 DIAGNOSIS — J44.89 ASTHMA-COPD OVERLAP SYNDROME (HCC): ICD-10-CM

## 2025-01-13 DIAGNOSIS — J44.9 CHRONIC OBSTRUCTIVE PULMONARY DISEASE, UNSPECIFIED COPD TYPE (HCC): ICD-10-CM

## 2025-01-13 DIAGNOSIS — Z12.31 ENCOUNTER FOR SCREENING MAMMOGRAM FOR MALIGNANT NEOPLASM OF BREAST: ICD-10-CM

## 2025-01-13 PROBLEM — F32.5 MAJOR DEPRESSIVE DISORDER WITH SINGLE EPISODE, IN FULL REMISSION (HCC): Chronic | Status: ACTIVE | Noted: 2023-08-28

## 2025-01-13 PROCEDURE — 3074F SYST BP LT 130 MM HG: CPT | Performed by: BEHAVIOR ANALYST

## 2025-01-13 PROCEDURE — 3078F DIAST BP <80 MM HG: CPT | Performed by: BEHAVIOR ANALYST

## 2025-01-13 PROCEDURE — 99397 PER PM REEVAL EST PAT 65+ YR: CPT | Performed by: BEHAVIOR ANALYST

## 2025-01-13 ASSESSMENT — PATIENT HEALTH QUESTIONNAIRE - PHQ9
1. LITTLE INTEREST OR PLEASURE IN DOING THINGS: NOT AT ALL
SUM OF ALL RESPONSES TO PHQ QUESTIONS 1-9: 0
6. FEELING BAD ABOUT YOURSELF - OR THAT YOU ARE A FAILURE OR HAVE LET YOURSELF OR YOUR FAMILY DOWN: NOT AL ALL
2. FEELING DOWN, DEPRESSED, IRRITABLE, OR HOPELESS: NOT AT ALL
7. TROUBLE CONCENTRATING ON THINGS, SUCH AS READING THE NEWSPAPER OR WATCHING TELEVISION: NOT AT ALL
8. MOVING OR SPEAKING SO SLOWLY THAT OTHER PEOPLE COULD HAVE NOTICED. OR THE OPPOSITE, BEING SO FIGETY OR RESTLESS THAT YOU HAVE BEEN MOVING AROUND A LOT MORE THAN USUAL: NOT AT ALL
4. FEELING TIRED OR HAVING LITTLE ENERGY: NOT AT ALL
3. TROUBLE FALLING OR STAYING ASLEEP OR SLEEPING TOO MUCH: NOT AT ALL
9. THOUGHTS THAT YOU WOULD BE BETTER OFF DEAD, OR OF HURTING YOURSELF: NOT AT ALL
5. POOR APPETITE OR OVEREATING: NOT AT ALL
SUM OF ALL RESPONSES TO PHQ9 QUESTIONS 1 AND 2: 0

## 2025-01-13 ASSESSMENT — FIBROSIS 4 INDEX: FIB4 SCORE: 1.21

## 2025-01-13 NOTE — PROGRESS NOTES
Subjective:     CC:   Chief Complaint   Patient presents with    Annual Exam     SCP Physical Exam     Other     Thyroid nodule check once a year.   Cyst on Liver check once a year.     Tightness of the stomach, lower abdominal pain. When coughing up, and hard to sit forward.     Ringing of the ears.   Foot cream given at least visit didn't help     Requesting Labs       HPI:   Sita Medina is a 64 y.o. female who presents for annual exam.        History of Present Illness  64-year-old female presents for follow-up.    Reports persistent lower abdominal discomfort with tightening and upward pressure, worsened by coughing or sneezing. Despite an October 2024 ultrasound showing no abnormalities, she suspects hernia and recent weight gain as contributing factors. Pain is localized around the umbilical region, described as temporary tightening. Notes sagging abdomen causing discomfort. No early satiety, nausea, or vomiting; normal appetite. Occasional right upper quadrant pain, initially thought to be hernia-related, now suspected liver-related. Chronic shortness of breath, recently follow up pulmonologist. History of large liver cyst [id number] identified in 2019, no follow-up since 2021 with [facility] who no longer has this role.  Advised regular imaging follow-ups with primary care physician but this has not been done.    No mammogram since January 2023.  Last Pap smear in 2021. No liver imaging for a couple of years. No bone density test since April 2023. No Tdap vaccine.    Thyroid nodule last evaluated a year ago. Occasional laryngitis and dysphagia, initially thought to be sinus-related. Reports tinnitus.    Plaque psoriasis on foot, recently split open. Plans to try Kerasal.    ALLERGIES  The patient has no known allergies.    MEDICATIONS  Current: alendronate    IMMUNIZATIONS  She declined the Tdap vaccine.    Health Maintenance  Vitamin D screening: ordered, due  Cholesterol  Screening:  ordered, due   Diabetes Screening:  ordered, due   Diet: decreased soda intake.    Exercise:   Physical Activity: Sufficiently Active (11/19/2022)    Exercise Vital Sign     Days of Exercise per Week: 3 days     Minutes of Exercise per Session: 60 min     Substance Abuse: denies   Safe in relationship.   Sun protection used.    Cancer screening  Colorectal Cancer Screening: up to date    Lung Cancer Screening: up to date  Cervical Cancer Screening: aged out. Up to date   Breast Cancer Screening: due, ordered     Infectious disease screening/Immunizations  --STI Screening: up to daTE   --Immunizations: declined Tdap, otherwise up to date    She  has a past medical history of ASTHMA, COPD (chronic obstructive pulmonary disease) ([facility]) (10/04/2010), COPD (chronic obstructive pulmonary disease) ([facility]) (10/04/2010), COVID-19 virus infection (01/03/2023), Eczema (10/04/2010), Moderate episode of recurrent major depressive disorder ([facility]) (08/28/2023), Onychomycosis of toenail (04/14/2021), Osteopenia (10/04/2010), Recurrent acute sinusitis, Shortness of breath, Snoring, and Wheezing.    She has no past medical history of CAD (coronary artery disease), Cancer ([facility]), Congestive heart failure ([facility]), Cough, Diabetes, Hypertension, Liver disease, Painful breathing, Renal disorder, Seizure disorder ([facility]), Sputum production, or Stroke ([facility]).  She  has no past surgical history on file.    Family History   Problem Relation Age of Onset    Cancer Mother         breast    Allergies Mother     Diabetes Father     Cancer Brother         esophageal cancer       Social History     Socioeconomic History    Marital status:      Spouse name: Not on file    Number of children: Not on file    Years of education: Not on file    Highest education level: Some college, no degree   Occupational History    Not on file   Tobacco Use    Smoking status: Former     Current packs/day: 0.00     Average packs/day: 0.8 packs/day for 30.0  years (22.5 ttl pk-yrs)     Types: Cigarettes     Start date: 1989     Quit date: 2019     Years since quittin.9    Smokeless tobacco: Never   Vaping Use    Vaping status: Never Used   Substance and Sexual Activity    Alcohol use: Never    Drug use: No    Sexual activity: Not Currently     Partners: Male   Other Topics Concern    Not on file   Social History Narrative    Not on file     Social Drivers of Health     Financial Resource Strain: Low Risk  (2024)    Overall Financial Resource Strain (CARDIA)     Difficulty of Paying Living Expenses: Not hard at all   Food Insecurity: No Food Insecurity (2024)    Hunger Vital Sign     Worried About Running Out of Food in the Last Year: Never true     Ran Out of Food in the Last Year: Never true   Transportation Needs: No Transportation Needs (2024)    PRAPARE - Transportation     Lack of Transportation (Medical): No     Lack of Transportation (Non-Medical): No   Physical Activity: Sufficiently Active (2022)    Exercise Vital Sign     Days of Exercise per Week: 3 days     Minutes of Exercise per Session: 60 min   Stress: No Stress Concern Present (2022)    Citizen of [place] [employer] [employer] - Occupational Stress Questionnaire     Feeling of Stress : Not at all   Social Connections: Moderately Integrated (2022)    Social Connection and Isolation Panel [NHANES]     Frequency of Communication with Friends and Family: More than three times a week     Frequency of Social Gatherings with Friends and Family: Three times a week     Attends Rastafarian Services: Never     Active Member of Clubs or Organizations: Yes     Attends Club or Organization Meetings: More than 4 times per year     Marital Status:    Intimate Partner Violence: Not on file   Housing Stability: Unknown (2022)    Housing Stability Vital Sign     Unable to Pay for Housing in the Last Year: Patient refused     Number of Places Lived in the Last Year:  Not on file     Unstable Housing in the Last Year: Patient refused       Patient Active Problem List    Diagnosis Date Noted    ELEUTERIO (obstructive sleep apnea) 09/06/2024    Lower abdominal pain 09/06/2024    Thyroid nodule 09/06/2024    Psoriasis plantaris 09/06/2024    SVT (supraventricular tachycardia) (formerly [facility]) 03/04/2024    Major depressive disorder with single episode, in full remission (formerly [facility]) 08/28/2023    Personal history of tobacco use 07/26/2023    Atherosclerosis of aorta (formerly [facility]) 04/25/2023    Hyperglycemia 02/27/2023    Dyslipidemia 03/01/2022    History of thyroid nodule 08/13/2021    GERD (gastroesophageal reflux disease) 05/04/2021    Onychomycosis of toenail 04/14/2021    Hepatic cyst 01/22/2021    Asthma-COPD overlap syndrome (formerly [facility]) 10/04/2010    Osteoporosis 10/04/2010         Current Outpatient Medications   Medication Sig Dispense Refill    famotidine (PEPCID) 40 MG Tab TAKE 1 TABLET BY MOUTH EVERY  Tablet 3    fluticasone-salmeterol (ADVAIR) 100-50 MCG/ACT AEROSOL POWDER, BREATH ACTIVATED Inhale 1 Puff every 12 hours. 3 Each 3    alendronate (FOSAMAX) 70 MG Tab TAKE 1 TABLET BY MOUTH ONE TIME PER WEEK 12 Tablet 3    albuterol 108 (90 Base) MCG/ACT Aero Soln inhalation aerosol Inhale 1-2 Puffs every four hours as needed for Shortness of Breath. 1 Each 2    betamethasone dipropionate (DEL-BETA) 0.05 % Ointment Apply 1 Application topically 2 times a day. Use for up to 2 weeks at a time (Patient not taking: Reported on 1/13/2025) 45 g 1     No current facility-administered medications for this visit.     Allergies   Allergen Reactions    Cefzil [Cefprozil]      nausea       Review of Systems   Eyes: Negative for pain.    Respiratory: chronic shortness of breath.  Cardiovascular: Negative for chest pain  Gastrointestinal: reports abdominal pain  Genitourinary: Negative for dysuria  Neurological: Negative for headaches.     Objective:     /72 (BP Location: Left arm, Patient Position: Sitting, BP  "Cuff Size: Adult)   Pulse 63   Temp 36.6 °C (97.9 °F) (Temporal)   Ht 1.727 m (5' 8\")   Wt 72.6 kg (160 lb)   LMP 04/02/2008   SpO2 98%   BMI 24.33 kg/m²   Body mass index is 24.33 kg/m².  Wt Readings from Last 4 Encounters:   01/13/25 72.6 kg (160 lb)   01/09/25 71.2 kg (157 lb)   11/12/24 70.3 kg (155 lb)   09/12/24 70.3 kg (155 lb)       Physical Exam:  Constitutional: Well-developed and well-nourished. Not diaphoretic. No distress.   Skin: Skin is warm and dry. No rash noted.  Head: Atraumatic without lesions.  Eyes: Conjunctivae and extraocular motions are normal. Pupils are equal, round, and reactive to light. No scleral icterus.   Ears:  External ears unremarkable. Tympanic membranes clear and intact.  Nose: Nares patent. Septum midline. Turbinates without erythema nor edema. No discharge.   Mouth/Throat:  Tongue normal. Oropharynx is clear and moist. Posterior pharynx without erythema or exudates.  Neck: Supple, trachea midline. Normal range of motion.  thyromegaly present. No lymphadenopathy--cervical or supraclavicular.  Cardiovascular: Regular rate and rhythm, S1 and S2 without murmur, rubs, or gallops.  Lungs: Normal inspiratory effort, CTA bilaterally, no wheezes/rhonchi/rales  Abdomen: Active bowel sounds in all four quadrants. Muscle tightness across lower abdominal pelvis tender with palpation. Mass in the epigastric area consistent with hx of hepatic cyst large. No rebound, guarding  Extremities: No cyanosis, clubbing, erythema, nor edema. Distal pulses intact and symmetric.   Musculoskeletal: All major joints AROM full in all directions without pain.  Neurological: Alert and oriented x 3. . No cranial nerve deficit. . Sensation intact.   Psychiatric:  Behavior, mood, and affect are appropriate.    US-PELVIC COMPLETE (TRANSABDOMINAL/TRANSVAGINAL) (COMBO)  Order: [id number]  10/1/2024 1:32 PM   FINDINGS:  Both transabdominal and transvaginal scanning were performed to optimally visualize the " pelvis.     The retroflexed uterus measures 2.88 cm x 5.40 cm x 5.03 cm.  The uterine myometrium is notable for an anterior right subserosal slightly hypoechoic to myometrium 23 x 18 x 18 mm mass. There are also peripheral uterine calcifications that are likely vascular. There are some mildly prominent parametrial vessels.  The endometrial echo complex measures 0.22 cm.     Low resistive waveforms are confirmed within the ovaries.     The right ovary measures 1.86 cm x 1.08 cm x 1.16 cm.     The left ovary measures 1.75 cm x 0.94 cm x 1.36 cm.     No free fluid is seen.     IMPRESSION:     23 mm subserosal leiomyoma     Mildly prominent parametrial vessels may indicate venous congestion    -------------------------------------------------------------  10/1/2024 1:32 PM   US-THYROID  Order: [id number]   Status: Final result       Visible to patient: Yes (seen)       Next appt: None       Dx: Thyroid nodule    COMPARISON:  8/9/2023 ultrasound of the soft tissues of the head and neck     FINDINGS:  The thyroid gland is homogeneous.  Vascularity is normal.     The right lobe of the thyroid gland measures 2.01 cm x 6.20 cm x 2.61 cm.  The left lobe of the thyroid gland measures 1.50 cm x 4.38 cm x 1.52 cm.  The isthmus measures 0.43 cm.     Nodules >= 1cm:     Nodule #1  Location:  Right  lower  Size:  3.61 x 2.73 x 2.20 cm diminished from the 43 x 27 x 23 mm  Composition:  Solid-2  Echogenicity:  Hypoechoic-2  Shape:  Wider than tall-0  Margins:  Smooth-0  Echogenic Foci:  Macroscopic-1     ACR TIRADS points/category:  5 - TR4 - Moderately Suspicious     IMPRESSION:     Mild interval decrease in size of moderately suspicious right lower pole mass, 3.6 cm.     ACR TI-RADS Recommendations  TR4 (>= 1.5cm) - Based solely on ultrasound findings, FNA could be considered    Assessment and Plan:     1. Wellness examination        2. Hepatic cyst  CT-ABDOMEN-PELVIS WITH    Referral to Gastroenterology      3. Asthma-COPD overlap  syndrome (HCC)        4. Chronic obstructive pulmonary disease, unspecified COPD type (HCC)        5. Encounter for screening mammogram for malignant neoplasm of breast  MA-SCREENING MAMMO BILAT W/TOMOSYNTHESIS W/CAD      6. Age-related osteoporosis without current pathological fracture  DS-BONE DENSITY STUDY (DEXA)      7. Postmenopausal  DS-BONE DENSITY STUDY (DEXA)      8. Hyperglycemia  HEMOGLOBIN A1C      9. Thyroid nodule  Referral to General Surgery      10. Pelvic pain  Referral to Gynecology      11. Leiomyoma  Referral to Gynecology          1. Wellness examination  HCM:     Applicable cancer screenings discussed with patient  Last lab results were reviewed by me today   New Labs per orders if indicated  Immunizations per orders if indicated  Patient counseling included stop/avoid smoking and nicotine products,  skin care with SPF, regular dental hygiene and dental visits and eye exams, diet, supplements, safe fire arm storage, safe sex and exercise  - Due for mammogram (last in January 2023) and bone density test (last in April 2023)  - Declined Tdap vaccine  - Up to date on colon cancer screening, lung cancer screening, and other vaccines  - Order mammogram  - Schedule bone density test for spring    2. Hepatic cyst  -Chronic, have not kept up with imaging and or monitoring.  last evaluated in 2021.   She is having some lower, diffuse abdominal pain that does not seem to be related to hepatic cyst.   -Proceed with CT abdomen and pelvis with IV and oral contrast.  She will get lab work done to check kidney function.   -Refer to gastroenterology.  - CT-ABDOMEN-PELVIS WITH; Future  - Referral to Gastroenterology    3. Asthma-COPD overlap syndrome (HCC)  -Continue management with pulmonary.  Continue Trelegy    4. Chronic obstructive pulmonary disease, unspecified COPD type (HCC)  -Continue management with pulmonary.  Continue Trelegy    5. Encounter for screening mammogram for malignant neoplasm of  breast  - MA-SCREENING MAMMO BILAT W/TOMOSYNTHESIS W/CAD; Future    6. Age-related osteoporosis without current pathological fracture  7. Postmenopausal  - DS-BONE DENSITY STUDY (DEXA); Future    8. Hyperglycemia  - HEMOGLOBIN A1C; Future    9. Thyroid nodule  -Chronic problem, recent imaging noting moderate suspicious greater than 1 cm nodule.   -Reporting hoarseness in voice and some difficulty swallowing.  Referring to Dr. Carina Carrera for further evaluation possible FNA  - Referral to General Surgery    10. Pelvic pain  -Chronic problem, persistent.  Had pelvic ultrasound showing leiomyoma and possible mild pelvic venous congestion  - Referral to Gynecology    11. Leiomyoma  -Chronic,  - Referral to Gynecology    New problems will have to be addressed at a separate visit due to time constraints      Follow-up: Return in about 2 months (around 3/13/2025) for Symptoms check.

## 2025-01-22 ENCOUNTER — PATIENT MESSAGE (OUTPATIENT)
Dept: MEDICAL GROUP | Facility: MEDICAL CENTER | Age: 65
End: 2025-01-22
Payer: MEDICARE

## 2025-01-22 DIAGNOSIS — D21.9 LEIOMYOMA: ICD-10-CM

## 2025-01-22 DIAGNOSIS — R10.2 PELVIC PAIN: ICD-10-CM

## 2025-01-23 ENCOUNTER — HOSPITAL ENCOUNTER (OUTPATIENT)
Dept: LAB | Facility: MEDICAL CENTER | Age: 65
End: 2025-01-23
Attending: BEHAVIOR ANALYST
Payer: MEDICARE

## 2025-01-23 DIAGNOSIS — M81.0 AGE-RELATED OSTEOPOROSIS WITHOUT CURRENT PATHOLOGICAL FRACTURE: Chronic | ICD-10-CM

## 2025-01-23 DIAGNOSIS — E04.1 THYROID NODULE: ICD-10-CM

## 2025-01-23 DIAGNOSIS — E78.5 DYSLIPIDEMIA: Chronic | ICD-10-CM

## 2025-01-23 DIAGNOSIS — R73.9 HYPERGLYCEMIA: Chronic | ICD-10-CM

## 2025-01-23 LAB
25(OH)D3 SERPL-MCNC: 35 NG/ML (ref 30–100)
ALBUMIN SERPL BCP-MCNC: 4 G/DL (ref 3.2–4.9)
ALBUMIN/GLOB SERPL: 1.7 G/DL
ALP SERPL-CCNC: 67 U/L (ref 30–99)
ALT SERPL-CCNC: 15 U/L (ref 2–50)
ANION GAP SERPL CALC-SCNC: 7 MMOL/L (ref 7–16)
AST SERPL-CCNC: 19 U/L (ref 12–45)
BILIRUB SERPL-MCNC: 0.5 MG/DL (ref 0.1–1.5)
BUN SERPL-MCNC: 15 MG/DL (ref 8–22)
CALCIUM ALBUM COR SERPL-MCNC: 9.6 MG/DL (ref 8.5–10.5)
CALCIUM SERPL-MCNC: 9.6 MG/DL (ref 8.5–10.5)
CHLORIDE SERPL-SCNC: 106 MMOL/L (ref 96–112)
CHOLEST SERPL-MCNC: 174 MG/DL (ref 100–199)
CO2 SERPL-SCNC: 27 MMOL/L (ref 20–33)
CREAT SERPL-MCNC: 0.74 MG/DL (ref 0.5–1.4)
EST. AVERAGE GLUCOSE BLD GHB EST-MCNC: 128 MG/DL
GFR SERPLBLD CREATININE-BSD FMLA CKD-EPI: 89 ML/MIN/1.73 M 2
GLOBULIN SER CALC-MCNC: 2.4 G/DL (ref 1.9–3.5)
GLUCOSE SERPL-MCNC: 110 MG/DL (ref 65–99)
HBA1C MFR BLD: 6.1 % (ref 4–5.6)
HDLC SERPL-MCNC: 44 MG/DL
LDLC SERPL CALC-MCNC: 118 MG/DL
POTASSIUM SERPL-SCNC: 4.8 MMOL/L (ref 3.6–5.5)
PROT SERPL-MCNC: 6.4 G/DL (ref 6–8.2)
SODIUM SERPL-SCNC: 140 MMOL/L (ref 135–145)
TRIGL SERPL-MCNC: 61 MG/DL (ref 0–149)
TSH SERPL DL<=0.005 MIU/L-ACNC: 1.8 UIU/ML (ref 0.38–5.33)

## 2025-01-23 PROCEDURE — 82306 VITAMIN D 25 HYDROXY: CPT

## 2025-01-23 PROCEDURE — 84443 ASSAY THYROID STIM HORMONE: CPT

## 2025-01-23 PROCEDURE — 83036 HEMOGLOBIN GLYCOSYLATED A1C: CPT

## 2025-01-23 PROCEDURE — 80053 COMPREHEN METABOLIC PANEL: CPT

## 2025-01-23 PROCEDURE — 80061 LIPID PANEL: CPT

## 2025-01-23 PROCEDURE — 36415 COLL VENOUS BLD VENIPUNCTURE: CPT

## 2025-01-23 NOTE — ASSESSMENT & PLAN NOTE
R thyroid nodule, prior benign bx, asx. I have recommended continued surveillance and will see the patient in 1y with a repeat U/S. The patient will RTC sooner should they have any new symptoms or complaints in relation to the thyroid, and is pleased with the plan.

## 2025-01-23 NOTE — PROGRESS NOTES
Surgical Endocrinology New Patient Visit    This is a 64 y.o. female who was referred to my office by Day Buchanan for a surgical evaluation of a right thyroid nodule. This has been surveilled for many years. She had a biopsy ~7y ago which was benign.    The patient does not have difficult swallowing.    The patient does not have a family history of thyroid disorders or malignancy.    The patient does not have a history of radiation to their head or neck.    Labs:  Lab Results   Component Value Date/Time    TSHULTRASEN 2.110 08/09/2023 0843    TSHULTRASEN 1.940 03/17/2022 0842     Imaging:     10/1/2024 1:32 PM     HISTORY/REASON FOR EXAM:  Thyroid nodule, follow up prior ultrasound.     TECHNIQUE/EXAM DESCRIPTION:  Ultrasound of the soft tissues of the head and neck.     COMPARISON:  8/9/2023 ultrasound of the soft tissues of the head and neck     FINDINGS:  The thyroid gland is homogeneous.  Vascularity is normal.     The right lobe of the thyroid gland measures 2.01 cm x 6.20 cm x 2.61 cm.  The left lobe of the thyroid gland measures 1.50 cm x 4.38 cm x 1.52 cm.  The isthmus measures 0.43 cm.     Nodules >= 1cm:     Nodule #1  Location:  Right  lower  Size:  3.61 x 2.73 x 2.20 cm diminished from the 43 x 27 x 23 mm  Composition:  Solid-2  Echogenicity:  Hypoechoic-2  Shape:  Wider than tall-0  Margins:  Smooth-0  Echogenic Foci:  Macroscopic-1     ACR TIRADS points/category:  5 - TR4 - Moderately Suspicious     IMPRESSION:     Mild interval decrease in size of moderately suspicious right lower pole mass, 3.6 cm.     ACR TI-RADS Recommendations  TR4 (>= 1.5cm) - Based solely on ultrasound findings, FNA could be considered    Review of Systems   Constitutional:  Negative for chills, fever, malaise/fatigue and weight loss.   HENT: Negative.     Eyes: Negative.    Respiratory:  Negative for cough and sputum production.    Cardiovascular:  Negative for chest pain and palpitations.   Gastrointestinal: Negative.     Genitourinary:  Negative for frequency and urgency.   Musculoskeletal: Negative.    Skin: Negative.    Neurological:  Negative for dizziness, weakness and headaches.   Endo/Heme/Allergies: Negative.    Psychiatric/Behavioral:  Negative for depression and memory loss. The patient is not nervous/anxious and does not have insomnia.    All other systems reviewed and are negative.    Past Medical History:   Diagnosis Date    ASTHMA     COPD (chronic obstructive pulmonary disease) ([facility]) 10/04/2010    COPD (chronic obstructive pulmonary disease) ([facility]) 10/04/2010    COVID-19 virus infection 01/03/2023    Went to ER on 12/25 for SOB and she thought was an asthma attack. Work up was unremarkable except for positive for COVID infection. She was given nebulizer treatment which was very helpful and sent home with Paxlovid which she took as prescribed until the 31st with no side effects. Also prescibed 40mg prednisone which she took for 2 days total and did not take 3rd dose due to feeling well.  She fe    Eczema 10/04/2010    Moderate episode of recurrent major depressive disorder ([facility]) 08/28/2023    Onychomycosis of toenail 04/14/2021    Osteopenia 10/04/2010    Recurrent acute sinusitis     Shortness of breath     Snoring     Wheezing      No past surgical history on file.    Home Medications    Medication Sig Taking? Last Dose Authorizing Provider   famotidine (PEPCID) 40 MG Tab TAKE 1 TABLET BY MOUTH EVERY DAY Yes Taking CALLY Donaldson.P.R.N.   fluticasone-salmeterol (ADVAIR) 100-50 MCG/ACT AEROSOL POWDER, BREATH ACTIVATED Inhale 1 Puff every 12 hours. Yes Taking ALFREDO SilvaP.RGUEVARA   alendronate (FOSAMAX) 70 MG Tab TAKE 1 TABLET BY MOUTH ONE TIME PER WEEK Yes Taking Kenia Morgan M.D.   albuterol 108 (90 Base) MCG/ACT Aero Soln inhalation aerosol Inhale 1-2 Puffs every four hours as needed for Shortness of Breath. Yes PRN CALLY Donaldson.P.R.N.   betamethasone dipropionate (DEL-BETA) 0.05 % Ointment  Apply 1 Application topically 2 times a day. Use for up to 2 weeks at a time  Patient not taking: Reported on 1/13/2025   GIOVANNI Donaldson     Physical Exam  Constitutional:       Appearance: Normal appearance.   HENT:      Head: Normocephalic and atraumatic.   Cardiovascular:      Rate and Rhythm: Normal rate.   Pulmonary:      Effort: Pulmonary effort is normal.   Abdominal:      General: Abdomen is flat.      Palpations: Abdomen is soft.   Musculoskeletal:         General: Normal range of motion.      Cervical back: Normal range of motion and neck supple.   Skin:     General: Skin is warm and dry.   Neurological:      General: No focal deficit present.      Mental Status: She is alert.   Psychiatric:         Mood and Affect: Mood normal.     Assessment/Plan:    Thyroid nodule  R thyroid nodule, prior benign bx, asx. I have recommended continued surveillance and will see the patient in 1y with a repeat U/S. The patient will RTC sooner should they have any new symptoms or complaints in relation to the thyroid, and is pleased with the plan.     Carina Carrera M.D., FACS  Department of Surgical Oncology  [facility] [place]  [phone]

## 2025-01-28 ENCOUNTER — HOSPITAL ENCOUNTER (OUTPATIENT)
Dept: RADIOLOGY | Facility: MEDICAL CENTER | Age: 65
End: 2025-01-28
Attending: BEHAVIOR ANALYST
Payer: MEDICARE

## 2025-01-28 DIAGNOSIS — K76.89 HEPATIC CYST: ICD-10-CM

## 2025-01-28 PROCEDURE — 700117 HCHG RX CONTRAST REV CODE 255: Performed by: BEHAVIOR ANALYST

## 2025-01-28 PROCEDURE — 74177 CT ABD & PELVIS W/CONTRAST: CPT

## 2025-01-28 RX ADMIN — IOHEXOL 25 ML: 240 INJECTION, SOLUTION INTRATHECAL; INTRAVASCULAR; INTRAVENOUS; ORAL at 14:47

## 2025-01-28 RX ADMIN — IOHEXOL 100 ML: 350 INJECTION, SOLUTION INTRAVENOUS at 14:47

## 2025-01-30 ENCOUNTER — OFFICE VISIT (OUTPATIENT)
Dept: SURGICAL ONCOLOGY | Facility: MEDICAL CENTER | Age: 65
End: 2025-01-30
Payer: MEDICARE

## 2025-01-30 VITALS
OXYGEN SATURATION: 96 % | DIASTOLIC BLOOD PRESSURE: 58 MMHG | TEMPERATURE: 98.4 F | BODY MASS INDEX: 24.4 KG/M2 | HEART RATE: 89 BPM | SYSTOLIC BLOOD PRESSURE: 112 MMHG | HEIGHT: 68 IN | WEIGHT: 161 LBS

## 2025-01-30 DIAGNOSIS — E04.1 THYROID NODULE: Chronic | ICD-10-CM

## 2025-01-30 ASSESSMENT — ENCOUNTER SYMPTOMS
INSOMNIA: 0
WEIGHT LOSS: 0
EYES NEGATIVE: 1
WEAKNESS: 0
DEPRESSION: 0
SPUTUM PRODUCTION: 0
HEADACHES: 0
DIZZINESS: 0
COUGH: 0
GASTROINTESTINAL NEGATIVE: 1
MEMORY LOSS: 0
MUSCULOSKELETAL NEGATIVE: 1
FEVER: 0
NERVOUS/ANXIOUS: 0
PALPITATIONS: 0
CHILLS: 0

## 2025-01-30 ASSESSMENT — FIBROSIS 4 INDEX: FIB4 SCORE: 1.4

## 2025-01-31 ENCOUNTER — PATIENT MESSAGE (OUTPATIENT)
Dept: MEDICAL GROUP | Facility: MEDICAL CENTER | Age: 65
End: 2025-01-31
Payer: MEDICARE

## 2025-02-04 ENCOUNTER — APPOINTMENT (OUTPATIENT)
Dept: RADIOLOGY | Facility: MEDICAL CENTER | Age: 65
End: 2025-02-04
Attending: BEHAVIOR ANALYST
Payer: MEDICARE

## 2025-02-07 ENCOUNTER — PATIENT MESSAGE (OUTPATIENT)
Dept: MEDICAL GROUP | Facility: MEDICAL CENTER | Age: 65
End: 2025-02-07
Payer: MEDICARE

## 2025-02-07 DIAGNOSIS — M62.838 SPASM OF ABDOMINAL MUSCLES: ICD-10-CM

## 2025-02-07 DIAGNOSIS — K31.89 STOMACH SPASM: ICD-10-CM

## 2025-02-11 ENCOUNTER — HOSPITAL ENCOUNTER (OUTPATIENT)
Dept: RADIOLOGY | Facility: MEDICAL CENTER | Age: 65
End: 2025-02-11
Attending: BEHAVIOR ANALYST
Payer: MEDICARE

## 2025-02-11 DIAGNOSIS — Z12.31 ENCOUNTER FOR SCREENING MAMMOGRAM FOR MALIGNANT NEOPLASM OF BREAST: ICD-10-CM

## 2025-02-11 PROCEDURE — 77067 SCR MAMMO BI INCL CAD: CPT

## 2025-02-20 ENCOUNTER — RESULTS FOLLOW-UP (OUTPATIENT)
Dept: MEDICAL GROUP | Facility: MEDICAL CENTER | Age: 65
End: 2025-02-20

## 2025-02-20 RX ORDER — ALENDRONATE SODIUM 70 MG/1
70 TABLET ORAL
Qty: 12 TABLET | Refills: 3 | Status: SHIPPED | OUTPATIENT
Start: 2025-02-20

## 2025-02-25 ENCOUNTER — TELEPHONE (OUTPATIENT)
Dept: HEALTH INFORMATION MANAGEMENT | Facility: OTHER | Age: 65
End: 2025-02-25
Payer: MEDICARE

## 2025-03-03 ENCOUNTER — APPOINTMENT (OUTPATIENT)
Dept: RADIOLOGY | Facility: MEDICAL CENTER | Age: 65
End: 2025-03-03
Attending: FAMILY MEDICINE
Payer: MEDICARE

## 2025-03-03 ENCOUNTER — HOSPITAL ENCOUNTER (OUTPATIENT)
Dept: RADIOLOGY | Facility: MEDICAL CENTER | Age: 65
End: 2025-03-03
Attending: BEHAVIOR ANALYST
Payer: MEDICARE

## 2025-03-03 DIAGNOSIS — R92.8 ABNORMAL MAMMOGRAM: ICD-10-CM

## 2025-03-03 PROCEDURE — 76642 ULTRASOUND BREAST LIMITED: CPT | Mod: LT

## 2025-03-07 ENCOUNTER — OFFICE VISIT (OUTPATIENT)
Dept: MEDICAL GROUP | Facility: MEDICAL CENTER | Age: 65
End: 2025-03-07
Payer: MEDICARE

## 2025-03-07 ENCOUNTER — HOSPITAL ENCOUNTER (OUTPATIENT)
Dept: RADIOLOGY | Facility: MEDICAL CENTER | Age: 65
End: 2025-03-07
Attending: FAMILY MEDICINE
Payer: MEDICARE

## 2025-03-07 VITALS
HEART RATE: 82 BPM | WEIGHT: 165 LBS | BODY MASS INDEX: 25.01 KG/M2 | SYSTOLIC BLOOD PRESSURE: 118 MMHG | DIASTOLIC BLOOD PRESSURE: 70 MMHG | HEIGHT: 68 IN | OXYGEN SATURATION: 97 % | TEMPERATURE: 97.1 F

## 2025-03-07 DIAGNOSIS — R10.30 LOWER ABDOMINAL PAIN: ICD-10-CM

## 2025-03-07 DIAGNOSIS — Z87.891 HISTORY OF TOBACCO USE: ICD-10-CM

## 2025-03-07 DIAGNOSIS — H69.92 DYSFUNCTION OF LEFT EUSTACHIAN TUBE: ICD-10-CM

## 2025-03-07 DIAGNOSIS — H65.93 MIDDLE EAR EFFUSION, BILATERAL: ICD-10-CM

## 2025-03-07 DIAGNOSIS — N60.02 CYST OF LEFT BREAST: ICD-10-CM

## 2025-03-07 PROCEDURE — 71271 CT THORAX LUNG CANCER SCR C-: CPT

## 2025-03-07 PROCEDURE — 3074F SYST BP LT 130 MM HG: CPT | Performed by: BEHAVIOR ANALYST

## 2025-03-07 PROCEDURE — 3078F DIAST BP <80 MM HG: CPT | Performed by: BEHAVIOR ANALYST

## 2025-03-07 PROCEDURE — 99214 OFFICE O/P EST MOD 30 MIN: CPT | Performed by: BEHAVIOR ANALYST

## 2025-03-07 RX ORDER — METHYLPREDNISOLONE 4 MG/1
TABLET ORAL
Qty: 21 TABLET | Refills: 0 | Status: SHIPPED | OUTPATIENT
Start: 2025-03-07

## 2025-03-07 ASSESSMENT — FIBROSIS 4 INDEX: FIB4 SCORE: 1.4

## 2025-03-07 NOTE — PROGRESS NOTES
"Subjective:     CC:    Chief Complaint   Patient presents with    Otalgia     Left ear, about a week ago, feels plugged and painful. (Went on a trip and when she came back the pain started.)           HISTORY OF THE PRESENT ILLNESS:   Sita presents today with    Her  Left ear pain below the ear and feels plugged x1 week, right after she got back from [place]. She does not swim.    Denies illness or cough. Admits to feeling post nasal drip and mild congestion. history of environmental allergies.   Takes Claritin, saline nasal spray  and Flonase most days.     Reports continued lower abdominal pain.  She reports in certain positions she is able to feel a tender bulging that she believes is a hernia most pronounced on the right side but also on the left side.    Recent mammogram showed a cyst in the left breast    Current Outpatient Medications   Medication Sig    methylPREDNISolone (MEDROL DOSEPAK) 4 MG Tablet Therapy Pack As directed on the packaging label.    alendronate (FOSAMAX) 70 MG Tab TAKE 1 TABLET BY MOUTH ONE TIME PER WEEK    famotidine (PEPCID) 40 MG Tab TAKE 1 TABLET BY MOUTH EVERY DAY    fluticasone-salmeterol (ADVAIR) 100-50 MCG/ACT AEROSOL POWDER, BREATH ACTIVATED Inhale 1 Puff every 12 hours.    albuterol 108 (90 Base) MCG/ACT Aero Soln inhalation aerosol Inhale 1-2 Puffs every four hours as needed for Shortness of Breath.    betamethasone dipropionate (DEL-BETA) 0.05 % Ointment Apply 1 Application topically 2 times a day. Use for up to 2 weeks at a time (Patient not taking: Reported on 1/13/2025)          ROS: See HPI  ROS      Objective:     Exam: /70 (BP Location: Left arm, Patient Position: Sitting, BP Cuff Size: Adult)   Pulse 82   Temp 36.2 °C (97.1 °F) (Temporal)   Ht 1.727 m (5' 8\")   Wt 74.8 kg (165 lb)   LMP 04/02/2008   SpO2 97%   BMI 25.09 kg/m²   Body mass index is 25.09 kg/m².    Physical Exam  Constitutional:       Appearance: Normal appearance.   HENT:      Head: " Normocephalic and atraumatic.      Right Ear: A middle ear effusion is present. There is no impacted cerumen.      Left Ear: A middle ear effusion is present. There is no impacted cerumen.   Cardiovascular:      Pulses: Normal pulses.   Pulmonary:      Effort: Pulmonary effort is normal.   Abdominal:      Tenderness: There is abdominal tenderness in the right lower quadrant.          Comments: Small, tender bulging palpated in area pictured   Musculoskeletal:      Cervical back: Normal range of motion.   Neurological:      General: No focal deficit present.      Mental Status: She is alert.                Assessment & Plan:     64 y.o. female with the following -     1. Dysfunction of left eustachian tube  -New  problem  -Continue saline rinse, nasal corticosteroid, antihistamine.  Can try switching to different antihistamine and nasal corticosteroid formulation.  -Start steroid. Educated patient regarding new medication and potential side effects.   - methylPREDNISolone (MEDROL DOSEPAK) 4 MG Tablet Therapy Pack; As directed on the packaging label.  Dispense: 21 Tablet; Refill: 0    2. Middle ear effusion, bilateral  -Explained to patient will likely go away on its own over time.  Is quite bothersome to patient so she would like to treat.  - methylPREDNISolone (MEDROL DOSEPAK) 4 MG Tablet Therapy Pack; As directed on the packaging label.  Dispense: 21 Tablet; Refill: 0      3. Cyst of left breast  -Repeat ultrasound in 6 months per radiology recommendations  - US-BREAST LIMITED-LEFT; Future    4. Lower abdominal pain  -Chronic problem, persistent.  -CT abdomen pelvis and pelvic ultrasound were unrevealing of cause.   -Patient feels a more pronounced area of tenderness and agree with her there is some slight bulging on exam.  She would like to pursue another ultrasound with a better look in this area.  - US-HERNIA ABDOMEN; Future          Return in about 6 months (around 9/7/2025) for Symptoms check.    Please note  that this dictation was created using voice recognition software. I have made every reasonable attempt to correct obvious errors, but I expect that there are errors of grammar and possibly content that I did not discover before finalizing the note.

## 2025-03-10 DIAGNOSIS — Z87.891 PERSONAL HISTORY OF TOBACCO USE: ICD-10-CM

## 2025-03-10 DIAGNOSIS — J44.89 ASTHMA-COPD OVERLAP SYNDROME (HCC): ICD-10-CM

## 2025-03-10 RX ORDER — FLUTICASONE PROPIONATE AND SALMETEROL 100; 50 UG/1; UG/1
1 POWDER RESPIRATORY (INHALATION) EVERY 12 HOURS
Qty: 180 EACH | Refills: 3 | Status: SHIPPED | OUTPATIENT
Start: 2025-03-10

## 2025-03-10 NOTE — TELEPHONE ENCOUNTER
Have we ever prescribed this med? Yes.  If yes, what date? 03/12/24    Last OV: 12/12/24 with Dr Pedro    Next OV: No Pending appt    DX: asthma-COPD overlap syndrome    Medications:   Requested Prescriptions     Pending Prescriptions Disp Refills    fluticasone-salmeterol (ADVAIR) 100-50 MCG/ACT AEROSOL POWDER, BREATH ACTIVATED [Pharmacy Med Name: WIXELA 100-50 INHUB] 180 Each 3     Sig: TAKE 1 PUFF BY MOUTH EVERY 12 HOURS

## 2025-03-12 ENCOUNTER — RESULTS FOLLOW-UP (OUTPATIENT)
Dept: SLEEP MEDICINE | Facility: MEDICAL CENTER | Age: 65
End: 2025-03-12
Payer: MEDICARE

## 2025-03-12 ENCOUNTER — TELEPHONE (OUTPATIENT)
Dept: SLEEP MEDICINE | Facility: MEDICAL CENTER | Age: 65
End: 2025-03-12
Payer: MEDICARE

## 2025-03-12 PROCEDURE — RXMED WILLOW AMBULATORY MEDICATION CHARGE: Performed by: STUDENT IN AN ORGANIZED HEALTH CARE EDUCATION/TRAINING PROGRAM

## 2025-03-12 NOTE — TELEPHONE ENCOUNTER
Phoned patient with results of LDCT exam performed 3/7/25.    Notified her that the results showed no concerning lung nodules.  Recommend follow up CT in 12 months.    Informed patient of incidental findings of 3cm right thyroid nodule, mild lung scarring, mild-moderate emphysematous lung changes, large liver cyst with recommendation to follow up with PCP.    Patient agrees to all recommendations.    Her PCP, Day COLE, was notified of results and incidental findings via this communication.    Health maintenance updated and patient sent lung cancer screening result letter.        CT-LUNG CANCER-SCREENING  Result Date: 3/9/2025  3/7/2025 5:42 PM HISTORY/REASON FOR EXAM:  64-year-old former smoker with 23 pack-year history of smoking.. TECHNIQUE/EXAM DESCRIPTION AND NUMBER OF VIEWS: Lung cancer screening without contrast. Low dose noncontrast helical images were obtained of the chest from the lung apices through the costophrenic sulci utilizing thin collimation and intervals with reconstructed images sent to PACS in axial, coronal and sagittal planes. Low dose optimization technique was utilized for this CT exam including automated exposure control and adjustment of the mA and/or kV according to patient size. COMPARISON: CT chest 8/30/2023 and additional FINDINGS: Lungs: Mild to moderate emphysema. Mild bilateral lung scarring. Small calcified right upper lobe granuloma. No airspace infiltrate or mass. Mediastinum/Nathalia: No adenopathy. Pleura: No pleural effusion. Cardiac: Heart normal in size. There is no coronary artery calcification. Vascular: Aorta is nonaneurysmal. Soft tissues: 3 cm right thyroid nodule is again noted. Upper abdomen: Limited visualized portion of the upper abdomen demonstrates no acute finding. Large liver cyst again noted. This study was performed without contrast and with lower than standard radiation-induced. These factors reduce the sensitivity for  detection of small lesions in  these locations. Bones: No acute process or concerning osseous lesion.     1.  No concerning pulmonary nodule. 2.  Emphysema. 3. 3 cm right thyroid nodule again noted. 4. Mild lung scarring. 5. Stable large liver cyst. Lung RADS: 1 - Negative: No nodules and definitely benign nodules Findings: no lung nodules nodule(s) with specific calcifications: complete; central; popcorn; concentric rings and fat containing nodules Management: Continue annual screening with LDCT in 12 months    US-BREAST LIMITED-LEFT  Result Date: 3/3/2025  3/3/2025 7:57 AM HISTORY/REASON FOR EXAM:  Cul-de-sac from screening mammogram for a circumscribed mass 6:00 position left breast. TECHNIQUE/EXAM DESCRIPTION AND NUMBER OF VIEWS: Left breast ultrasound. COMPARISON:   Mammogram 2/11/2025, 1/12/2023 and 4/14/2021 FINDINGS: In the posterior 6:00 position left breast 3 cm from the nipple there is a 4 x 3 x 2 mm circumscribed cystic area with a tiny peripheral calcification and one slightly irregular border. No internal blood flow or definitive solid components. This correlates to the mammographic finding.     1.  Probable benign complicated cyst left breast posterior 6:00 position. A 6 month follow-up ultrasound is recommended to confirm stability. These results were given to the patient at the time of visit. BI-RADS Category: R3 - CATEGORY 3: PROBABLY BENIGN FINDING - SHORT INTERVAL FOLLOW-UP SUGGESTED    MA-SCREENING MAMMO BILAT W/TOMOSYNTHESIS W/CAD  Result Date: 2/12/2025 2/11/2025 1:11 PM HISTORY/REASON FOR EXAM:  Routine Mammographic Screening. TECHNIQUE/EXAM DESCRIPTION: Bilateral tomosynthesis screening mammography was performed with standard mammographic images generated from the data set. Images were reviewed and interpreted with CAD. COMPARISON:   01/12/2023 FINDINGS: There are scattered areas of fibroglandular density. Small benign-appearing circumscribed mass appears to be present posterior edge central left breast near the 6:00 to  7:00 position containing a punctate calcification. No other new dominant mass or abnormal calcification or architectural distortion is identified.     1.  Ultrasound and possible additional views are recommended for small circumscribed mass posterior left breast. R0 -  CATEGORY 0: NEED ADDITIONAL IMAGING EVALUATION AND/OR PRIOR MAMMOGRAM FOR COMPARISON Ten to twenty percent of all cancers can be categorized as hereditary and the clinical and financial value of identifying patients and families at risk is well documented. If you have a personal or family history of breast, ovarian, fallopian tube, peritoneal or other cancer, please consult your physician regarding genetic counseling and testing. [facility] [place] [facility] is offering no cost genetic screening for hereditary breast and ovarian cancer. For more information, discuss with your provider, sign-up through [employer], or visit [email or web address] to learn more.

## 2025-03-12 NOTE — TELEPHONE ENCOUNTER
Received Renewal request via [employer]  for fluticasone-salmeterol (ADVAIR) 100-50 MCG/ACT AEROSOL POWDER, BREATH ACTIVATED . (Quantity:180, Day Supply:90)     Insurance: [place] [employer]  Member ID:  [id number]  BIN: [id number]  PCN: [id number]  Group: [facility]     Ran Test claim via [place] [facility] medication Pays for a $30 copay. Will outreach to patient to offer specialty pharmacy services and or release to preferred pharmacy    Thank you,   Lilliana Romero Ines  Pharmacy Liaison      
independent

## 2025-03-14 ENCOUNTER — PHARMACY VISIT (OUTPATIENT)
Dept: PHARMACY | Facility: MEDICAL CENTER | Age: 65
End: 2025-03-14
Payer: COMMERCIAL

## 2025-04-15 ENCOUNTER — HOSPITAL ENCOUNTER (OUTPATIENT)
Dept: RADIOLOGY | Facility: MEDICAL CENTER | Age: 65
End: 2025-04-15
Attending: BEHAVIOR ANALYST
Payer: MEDICARE

## 2025-04-15 DIAGNOSIS — Z78.0 POSTMENOPAUSAL: ICD-10-CM

## 2025-04-15 DIAGNOSIS — M81.0 AGE-RELATED OSTEOPOROSIS WITHOUT CURRENT PATHOLOGICAL FRACTURE: Chronic | ICD-10-CM

## 2025-04-15 PROCEDURE — 77080 DXA BONE DENSITY AXIAL: CPT

## 2025-04-18 ENCOUNTER — RESULTS FOLLOW-UP (OUTPATIENT)
Dept: MEDICAL GROUP | Facility: MEDICAL CENTER | Age: 65
End: 2025-04-18
Payer: MEDICARE

## 2025-04-29 ENCOUNTER — RESULTS FOLLOW-UP (OUTPATIENT)
Dept: SLEEP MEDICINE | Facility: MEDICAL CENTER | Age: 65
End: 2025-04-29

## 2025-06-06 PROCEDURE — RXMED WILLOW AMBULATORY MEDICATION CHARGE: Performed by: STUDENT IN AN ORGANIZED HEALTH CARE EDUCATION/TRAINING PROGRAM

## 2025-06-10 ENCOUNTER — PHARMACY VISIT (OUTPATIENT)
Dept: PHARMACY | Facility: MEDICAL CENTER | Age: 65
End: 2025-06-10
Payer: COMMERCIAL

## 2025-07-10 NOTE — PROGRESS NOTES
Pulmonary Clinic Note    Date of Visit: 7/11/2025     Chief Complaint:  Chief Complaint   Patient presents with    Follow-Up     Last seen 06/06/24 w/ Chano COLE  Dx: Asthma-COPD overlap syndrome    Results     OPO 12/10/24     HPI:   Sita Medina is a very pleasant 65 y.o. year old female former smoker (22 pack-years, quit in 2019), with a PMHx of asthma-COPD, ELEUTERIO on PAP therapy, SVT, GERD who presented to the Pulmonary Clinic for a regular follow up. Last seen in the office on 10/25/2023 with Dr. Olvera.     Patient is followed by the pulmonary office for asthma-COPD overlap syndrome.  PFTs in 2023 show mild airflow obstruction with a FEV1 2.01 L or 75%, FEV1/FVC 68%, %, DLCO 112% predicted, with bronchodilator response.  Lung cancer screening CT shows mild to moderate emphysema and mild bilateral lung scarring.  Also shows a small calcified RUL granuloma but does not show any concerning masses or nodules.  Absolute eosinophils in 2022 were 0.01, but they have been as high as 0.58.  Patient is currently on Wixela 100 and albuterol.      Interval events:  7/11/2025-  Patient states that she had pneumonia in January and was seen at [facility].  Per patient report, they diagnosed her with pneumonia based off of CXR and was treated with azithromycin and prednisone and possibly Augmentin.  She states since that time she has noticed that she is had an increase in her shortness of breath with mMRC of 1 will have a occasional cough though produced rare green sputum and occasional wheeze she denies any specific allergy symptoms but is interested in allergy testing today.  She continues to use and benefit from Wixela 100 she has not been on Singulair and will use her albuterol 1-2 times a week.      Exacerbations this year:  0    Current medication regimen:  Wixela 100 and albuterol     Oxygen use:  None    MMRC ndGndrndanddndend:nd nd2nd Past Medical History[1]  Past Surgical History[2]  Social History      Socioeconomic History    Marital status:      Spouse name: Not on file    Number of children: Not on file    Years of education: Not on file    Highest education level: Some college, no degree   Occupational History    Not on file   Tobacco Use    Smoking status: Former     Current packs/day: 0.00     Average packs/day: 0.8 packs/day for 30.0 years (22.5 ttl pk-yrs)     Types: Cigarettes     Start date: 1989     Quit date: 2019     Years since quittin.4    Smokeless tobacco: Never   Vaping Use    Vaping status: Never Used   Substance and Sexual Activity    Alcohol use: Never    Drug use: No    Sexual activity: Not Currently     Partners: Male   Other Topics Concern    Not on file   Social History Narrative    Not on file     Social Drivers of Health     Financial Resource Strain: Low Risk  (2024)    Overall Financial Resource Strain (CARDIA)     Difficulty of Paying Living Expenses: Not hard at all   Food Insecurity: No Food Insecurity (2024)    Hunger Vital Sign     Worried About Running Out of Food in the Last Year: Never true     Ran Out of Food in the Last Year: Never true   Transportation Needs: No Transportation Needs (2024)    PRAPARE - Transportation     Lack of Transportation (Medical): No     Lack of Transportation (Non-Medical): No   Physical Activity: Sufficiently Active (2022)    Exercise Vital Sign     Days of Exercise per Week: 3 days     Minutes of Exercise per Session: 60 min   Stress: No Stress Concern Present (2022)    [employer] - Occupational Stress Questionnaire     Feeling of Stress : Not at all   Social Connections: Moderately Integrated (2022)    Social Connection and Isolation Panel [NHANES]     Frequency of Communication with Friends and Family: More than three times a week     Frequency of Social Gatherings with Friends and Family: Three times a week     Attends Cheondoism Services: Never     Active  "Member of Clubs or Organizations: Yes     Attends Club or Organization Meetings: More than 4 times per year     Marital Status:    Intimate Partner Violence: Not on file   Housing Stability: Unknown (11/19/2022)    Housing Stability Vital Sign     Unable to Pay for Housing in the Last Year: Patient refused     Number of Places Lived in the Last Year: Not on file     Unstable Housing in the Last Year: Patient refused        Family History   Problem Relation Age of Onset    Cancer Mother         breast    Allergies Mother     Diabetes Father     Cancer Brother         esophageal cancer     Medications Ordered Prior to Encounter[3]  Allergies: Cefzil [cefprozil]    ROS:   Review of Systems   Constitutional:  Negative for chills, diaphoresis, fever and malaise/fatigue.   HENT:  Negative for congestion and sinus pain.    Respiratory:  Positive for cough, sputum production, shortness of breath and wheezing. Negative for hemoptysis.    Cardiovascular:  Negative for chest pain, palpitations and leg swelling.   Gastrointestinal:  Negative for diarrhea, heartburn, nausea and vomiting.   Musculoskeletal:  Negative for falls and myalgias.   Neurological:  Negative for dizziness, weakness and headaches.     Vitals:  BP (!) 82/50 (BP Location: Left arm, Patient Position: Sitting, BP Cuff Size: Adult)   Pulse 76   Ht 1.753 m (5' 9\")   Wt 72.1 kg (159 lb)   SpO2 95%     Physical Exam  Constitutional:       General: She is not in acute distress.     Appearance: Normal appearance. She is not ill-appearing, toxic-appearing or diaphoretic.   Cardiovascular:      Rate and Rhythm: Normal rate and regular rhythm.      Pulses: Normal pulses.      Heart sounds: Normal heart sounds. No murmur heard.     No friction rub. No gallop.   Pulmonary:      Effort: Pulmonary effort is normal. No respiratory distress.      Breath sounds: Normal breath sounds. No stridor. No wheezing, rhonchi or rales.   Musculoskeletal:         General: No " swelling.      Right lower leg: No edema.      Left lower leg: No edema.   Skin:     General: Skin is warm.   Neurological:      General: No focal deficit present.      Mental Status: She is alert and oriented to person, place, and time.   Psychiatric:         Mood and Affect: Mood normal.         Behavior: Behavior normal.         Thought Content: Thought content normal.         Judgment: Judgment normal.         Laboratory Data:  PFTs: (Date: 8/21/2023)-      Impression:  Baseline spirometry shows airflow obstruction with an FEV1/FVC ratio 68 and FEV1 of 2.01 L or 75% predicted.  There is significant bronchodilator response with improvement in FEV1 to 2.22 L or 83% predicted and a postbronchodilator FEV1/FVC ratio of 78.  Lung volumes are within normal limits.  Diffusion capacity is within normal limits.  Pulmonary function testing shows mild airflow obstruction with near complete reversibility following bronchodilator suggesting reactive airways disease or mild obstructive lung disease.  Correlate clinically and with imaging.      Lung cancer screening CT Chest: (Date: 3/7/2025)-  Impression:  Lungs: Mild to moderate emphysema. Mild bilateral lung scarring. Small calcified right upper lobe granuloma. No airspace infiltrate or mass.  Mediastinum/Nathalia: No adenopathy.  Pleura: No pleural effusion.  Cardiac: Heart normal in size. There is no coronary artery calcification.  Vascular: Aorta is nonaneurysmal.  Soft tissues: 3 cm right thyroid nodule is again noted.  Upper abdomen: Limited visualized portion of the upper abdomen demonstrates no acute finding. Large liver cyst again noted. This study was performed without contrast and with lower than standard radiation-induced. These factors reduce the sensitivity for   detection of small lesions in these locations.  Bones: No acute process or concerning osseous lesion.        Assessment & Plan  Asthma-COPD overlap syndrome (HCC)  FTs in 2023 show mild airflow obstruction  and bronchodilator response.  Patient has had a slight increase in her symptoms since pneumonia in January.  Patient is on Wixela 100 and albuterol.  --Continue Wixela  --Continue albuterol as needed  --Repeat CXR to monitor for resolution of pneumonia  --We will send for CBC with differential, IgE, and allergy testing, as patient is interested in allergy testing and to see if that is a component to her breathing.  Although she does deny any allergy symptoms.  --Advised patient increase exercise as tolerated  --Advised patient to remain up-to-date on all vaccines    Orders:    DX-CHEST-2 VIEWS; Future    CBC WITH DIFFERENTIAL; Future    ALLERGY ZONE 15    IGE SERUM; Future    BMI 23.0-23.9, adult           Diagnostic studies have been reviewed with the patient.    Return in about 2 months (around 9/11/2025), or if symptoms worsen or fail to improve, for Asthma-COPD overlap, lab work & CXR, with Linda .     This note was generated using voice recognition software which has a chance of producing errors of grammar and possibly content.  I have made every reasonable attempt to find and correct any obvious errors, but it should be expected that some may not be found prior to finalization of this note.    Time spent in record review prior to patient arrival, reviewing results, and in face-to-face encounter totaled 28 min.  __________  DOUGLAS Mckeon  Pulmonary Medicine  [facility]          [1]   Past Medical History:  Diagnosis Date    ASTHMA     COPD (chronic obstructive pulmonary disease) ([facility]) 10/04/2010    COPD (chronic obstructive pulmonary disease) ([facility]) 10/04/2010    COVID-19 virus infection 01/03/2023    Went to ER on 12/25 for SOB and she thought was an asthma attack. Work up was unremarkable except for positive for COVID infection. She was given nebulizer treatment which was very helpful and sent home with Paxlovid which she took as prescribed until the 31st with no side effects. Also prescibed 40mg  prednisone which she took for 2 days total and did not take 3rd dose due to feeling well.  She fe    Eczema 10/04/2010    Moderate episode of recurrent major depressive disorder (HCC) 08/28/2023    Onychomycosis of toenail 04/14/2021    Osteopenia 10/04/2010    Recurrent acute sinusitis     Shortness of breath     Snoring     Wheezing    [2] No past surgical history on file.  [3]   Current Outpatient Medications on File Prior to Visit   Medication Sig Dispense Refill    predniSONE (DELTASONE) 20 MG Tab       fluticasone-salmeterol (ADVAIR) 100-50 MCG/ACT AEROSOL POWDER, BREATH ACTIVATED TAKE 1 PUFF BY MOUTH EVERY 12 HOURS 180 Each 3    alendronate (FOSAMAX) 70 MG Tab TAKE 1 TABLET BY MOUTH ONE TIME PER WEEK 12 Tablet 3    famotidine (PEPCID) 40 MG Tab TAKE 1 TABLET BY MOUTH EVERY  Tablet 3    albuterol 108 (90 Base) MCG/ACT Aero Soln inhalation aerosol Inhale 1-2 Puffs every four hours as needed for Shortness of Breath. 1 Each 2    methylPREDNISolone (MEDROL DOSEPAK) 4 MG Tablet Therapy Pack As directed on the packaging label. 21 Tablet 0    betamethasone dipropionate (DEL-BETA) 0.05 % Ointment Apply 1 Application topically 2 times a day. Use for up to 2 weeks at a time (Patient not taking: Reported on 1/13/2025) 45 g 1     No current facility-administered medications on file prior to visit.

## 2025-07-11 ENCOUNTER — OFFICE VISIT (OUTPATIENT)
Dept: SLEEP MEDICINE | Facility: MEDICAL CENTER | Age: 65
End: 2025-07-11
Payer: MEDICARE

## 2025-07-11 VITALS
SYSTOLIC BLOOD PRESSURE: 82 MMHG | HEART RATE: 76 BPM | WEIGHT: 159 LBS | HEIGHT: 69 IN | BODY MASS INDEX: 23.55 KG/M2 | OXYGEN SATURATION: 95 % | DIASTOLIC BLOOD PRESSURE: 50 MMHG

## 2025-07-11 DIAGNOSIS — J44.89 ASTHMA-COPD OVERLAP SYNDROME (HCC): Primary | ICD-10-CM

## 2025-07-11 PROCEDURE — 99213 OFFICE O/P EST LOW 20 MIN: CPT

## 2025-07-11 PROCEDURE — 3078F DIAST BP <80 MM HG: CPT

## 2025-07-11 PROCEDURE — 99214 OFFICE O/P EST MOD 30 MIN: CPT

## 2025-07-11 PROCEDURE — 3074F SYST BP LT 130 MM HG: CPT

## 2025-07-11 RX ORDER — PREDNISONE 20 MG/1
TABLET ORAL
COMMUNITY
Start: 2025-02-07

## 2025-07-11 ASSESSMENT — ENCOUNTER SYMPTOMS
SPUTUM PRODUCTION: 1
SINUS PAIN: 0
HEMOPTYSIS: 0
DIARRHEA: 0
DIZZINESS: 0
WHEEZING: 1
COUGH: 1
PALPITATIONS: 0
WEAKNESS: 0
CHILLS: 0
HEADACHES: 0
FALLS: 0
SHORTNESS OF BREATH: 1
VOMITING: 0
FEVER: 0
MYALGIAS: 0
DIAPHORESIS: 0
HEARTBURN: 0
NAUSEA: 0

## 2025-07-11 ASSESSMENT — FIBROSIS 4 INDEX: FIB4 SCORE: 1.42

## 2025-07-11 NOTE — ASSESSMENT & PLAN NOTE
FTs in 2023 show mild airflow obstruction and bronchodilator response.  Patient has had a slight increase in her symptoms since pneumonia in January.  Patient is on Wixela 100 and albuterol.  --Continue Wixela  --Continue albuterol as needed  --Repeat CXR to monitor for resolution of pneumonia  --We will send for CBC with differential, IgE, and allergy testing, as patient is interested in allergy testing and to see if that is a component to her breathing.  Although she does deny any allergy symptoms.  --Advised patient increase exercise as tolerated  --Advised patient to remain up-to-date on all vaccines    Orders:    DX-CHEST-2 VIEWS; Future    CBC WITH DIFFERENTIAL; Future    ALLERGY ZONE 15    IGE SERUM; Future

## 2025-08-15 ENCOUNTER — HOSPITAL ENCOUNTER (OUTPATIENT)
Dept: RADIOLOGY | Facility: MEDICAL CENTER | Age: 65
End: 2025-08-15
Attending: BEHAVIOR ANALYST
Payer: MEDICARE

## 2025-08-15 DIAGNOSIS — R10.30 LOWER ABDOMINAL PAIN: ICD-10-CM

## 2025-08-15 PROCEDURE — 76857 US EXAM PELVIC LIMITED: CPT

## 2025-08-19 ENCOUNTER — RESULTS FOLLOW-UP (OUTPATIENT)
Dept: MEDICAL GROUP | Facility: MEDICAL CENTER | Age: 65
End: 2025-08-19
Payer: MEDICARE

## 2025-08-20 ENCOUNTER — HOSPITAL ENCOUNTER (EMERGENCY)
Facility: MEDICAL CENTER | Age: 65
End: 2025-08-20
Attending: EMERGENCY MEDICINE
Payer: MEDICARE

## 2025-08-20 ENCOUNTER — APPOINTMENT (OUTPATIENT)
Dept: RADIOLOGY | Facility: MEDICAL CENTER | Age: 65
End: 2025-08-20
Attending: EMERGENCY MEDICINE
Payer: MEDICARE

## 2025-08-20 VITALS
BODY MASS INDEX: 25.06 KG/M2 | TEMPERATURE: 98.7 F | HEART RATE: 76 BPM | DIASTOLIC BLOOD PRESSURE: 60 MMHG | OXYGEN SATURATION: 95 % | SYSTOLIC BLOOD PRESSURE: 129 MMHG | WEIGHT: 165.34 LBS | HEIGHT: 68 IN | RESPIRATION RATE: 15 BRPM

## 2025-08-20 DIAGNOSIS — R07.9 CHEST PAIN, UNSPECIFIED TYPE: Primary | ICD-10-CM

## 2025-08-20 DIAGNOSIS — K21.9 GASTROESOPHAGEAL REFLUX DISEASE, UNSPECIFIED WHETHER ESOPHAGITIS PRESENT: ICD-10-CM

## 2025-08-20 DIAGNOSIS — J98.01 BRONCHOSPASM: ICD-10-CM

## 2025-08-20 DIAGNOSIS — J45.901 MODERATE ASTHMA WITH ACUTE EXACERBATION, UNSPECIFIED WHETHER PERSISTENT: ICD-10-CM

## 2025-08-20 LAB
ALBUMIN SERPL BCP-MCNC: 4.1 G/DL (ref 3.2–4.9)
ALBUMIN/GLOB SERPL: 1.6 G/DL
ALP SERPL-CCNC: 81 U/L (ref 30–99)
ALT SERPL-CCNC: 21 U/L (ref 2–50)
ANION GAP SERPL CALC-SCNC: 11 MMOL/L (ref 7–16)
AST SERPL-CCNC: 21 U/L (ref 12–45)
BASOPHILS # BLD AUTO: 0.8 % (ref 0–1.8)
BASOPHILS # BLD: 0.05 K/UL (ref 0–0.12)
BILIRUB SERPL-MCNC: <0.2 MG/DL (ref 0.1–1.5)
BUN SERPL-MCNC: 18 MG/DL (ref 8–22)
CALCIUM ALBUM COR SERPL-MCNC: 9.9 MG/DL (ref 8.5–10.5)
CALCIUM SERPL-MCNC: 10 MG/DL (ref 8.5–10.5)
CHLORIDE SERPL-SCNC: 107 MMOL/L (ref 96–112)
CO2 SERPL-SCNC: 26 MMOL/L (ref 20–33)
CREAT SERPL-MCNC: 0.87 MG/DL (ref 0.5–1.4)
EKG IMPRESSION: NORMAL
EOSINOPHIL # BLD AUTO: 0.3 K/UL (ref 0–0.51)
EOSINOPHIL NFR BLD: 4.8 % (ref 0–6.9)
ERYTHROCYTE [DISTWIDTH] IN BLOOD BY AUTOMATED COUNT: 43.3 FL (ref 35.9–50)
GFR SERPLBLD CREATININE-BSD FMLA CKD-EPI: 73 ML/MIN/1.73 M 2
GLOBULIN SER CALC-MCNC: 2.6 G/DL (ref 1.9–3.5)
GLUCOSE SERPL-MCNC: 118 MG/DL (ref 65–99)
HCT VFR BLD AUTO: 43.2 % (ref 37–47)
HGB BLD-MCNC: 14.3 G/DL (ref 12–16)
IMM GRANULOCYTES # BLD AUTO: 0.01 K/UL (ref 0–0.11)
IMM GRANULOCYTES NFR BLD AUTO: 0.2 % (ref 0–0.9)
LYMPHOCYTES # BLD AUTO: 2.32 K/UL (ref 1–4.8)
LYMPHOCYTES NFR BLD: 36.8 % (ref 22–41)
MCH RBC QN AUTO: 29.8 PG (ref 27–33)
MCHC RBC AUTO-ENTMCNC: 33.1 G/DL (ref 32.2–35.5)
MCV RBC AUTO: 90 FL (ref 81.4–97.8)
MONOCYTES # BLD AUTO: 0.79 K/UL (ref 0–0.85)
MONOCYTES NFR BLD AUTO: 12.5 % (ref 0–13.4)
NEUTROPHILS # BLD AUTO: 2.83 K/UL (ref 1.82–7.42)
NEUTROPHILS NFR BLD: 44.9 % (ref 44–72)
NRBC # BLD AUTO: 0 K/UL
NRBC BLD-RTO: 0 /100 WBC (ref 0–0.2)
PLATELET # BLD AUTO: 234 K/UL (ref 164–446)
PMV BLD AUTO: 9.3 FL (ref 9–12.9)
POTASSIUM SERPL-SCNC: 3.7 MMOL/L (ref 3.6–5.5)
PROT SERPL-MCNC: 6.7 G/DL (ref 6–8.2)
RBC # BLD AUTO: 4.8 M/UL (ref 4.2–5.4)
SODIUM SERPL-SCNC: 144 MMOL/L (ref 135–145)
TROPONIN T SERPL-MCNC: 6 NG/L (ref 6–19)
TROPONIN T SERPL-MCNC: <6 NG/L (ref 6–19)
WBC # BLD AUTO: 6.3 K/UL (ref 4.8–10.8)

## 2025-08-20 PROCEDURE — 84484 ASSAY OF TROPONIN QUANT: CPT | Mod: 91

## 2025-08-20 PROCEDURE — 700102 HCHG RX REV CODE 250 W/ 637 OVERRIDE(OP): Performed by: EMERGENCY MEDICINE

## 2025-08-20 PROCEDURE — 700111 HCHG RX REV CODE 636 W/ 250 OVERRIDE (IP): Performed by: EMERGENCY MEDICINE

## 2025-08-20 PROCEDURE — 94640 AIRWAY INHALATION TREATMENT: CPT

## 2025-08-20 PROCEDURE — 700101 HCHG RX REV CODE 250

## 2025-08-20 PROCEDURE — 36415 COLL VENOUS BLD VENIPUNCTURE: CPT

## 2025-08-20 PROCEDURE — 85025 COMPLETE CBC W/AUTO DIFF WBC: CPT

## 2025-08-20 PROCEDURE — 94760 N-INVAS EAR/PLS OXIMETRY 1: CPT

## 2025-08-20 PROCEDURE — 700101 HCHG RX REV CODE 250: Performed by: EMERGENCY MEDICINE

## 2025-08-20 PROCEDURE — 71045 X-RAY EXAM CHEST 1 VIEW: CPT

## 2025-08-20 PROCEDURE — 80053 COMPREHEN METABOLIC PANEL: CPT

## 2025-08-20 PROCEDURE — 99284 EMERGENCY DEPT VISIT MOD MDM: CPT

## 2025-08-20 PROCEDURE — 93005 ELECTROCARDIOGRAM TRACING: CPT | Mod: TC | Performed by: EMERGENCY MEDICINE

## 2025-08-20 PROCEDURE — A9270 NON-COVERED ITEM OR SERVICE: HCPCS | Performed by: EMERGENCY MEDICINE

## 2025-08-20 RX ORDER — PREDNISONE 10 MG/1
40 TABLET ORAL ONCE
Status: COMPLETED | OUTPATIENT
Start: 2025-08-20 | End: 2025-08-20

## 2025-08-20 RX ORDER — IPRATROPIUM BROMIDE AND ALBUTEROL SULFATE 2.5; .5 MG/3ML; MG/3ML
SOLUTION RESPIRATORY (INHALATION)
Status: COMPLETED
Start: 2025-08-20 | End: 2025-08-20

## 2025-08-20 RX ORDER — PREDNISONE 20 MG/1
40 TABLET ORAL DAILY
Qty: 6 TABLET | Refills: 0 | Status: SHIPPED | OUTPATIENT
Start: 2025-08-20 | End: 2025-08-23

## 2025-08-20 RX ORDER — IPRATROPIUM BROMIDE AND ALBUTEROL SULFATE 2.5; .5 MG/3ML; MG/3ML
3 SOLUTION RESPIRATORY (INHALATION) ONCE
Status: COMPLETED | OUTPATIENT
Start: 2025-08-20 | End: 2025-08-20

## 2025-08-20 RX ORDER — OMEPRAZOLE 20 MG/1
20 CAPSULE, DELAYED RELEASE ORAL ONCE
Status: COMPLETED | OUTPATIENT
Start: 2025-08-20 | End: 2025-08-20

## 2025-08-20 RX ADMIN — PREDNISONE 40 MG: 10 TABLET ORAL at 05:41

## 2025-08-20 RX ADMIN — HYOSCYAMINE SULFATE 30 ML: 0.12 ELIXIR ORAL at 02:28

## 2025-08-20 RX ADMIN — OMEPRAZOLE 20 MG: 20 CAPSULE, DELAYED RELEASE ORAL at 05:41

## 2025-08-20 RX ADMIN — IPRATROPIUM BROMIDE AND ALBUTEROL SULFATE 3 ML: .5; 2.5 SOLUTION RESPIRATORY (INHALATION) at 03:10

## 2025-08-20 RX ADMIN — IPRATROPIUM BROMIDE AND ALBUTEROL SULFATE 3 ML: 2.5; .5 SOLUTION RESPIRATORY (INHALATION) at 03:10

## 2025-08-20 ASSESSMENT — HEART SCORE
TROPONIN: LESS THAN OR EQUAL TO NORMAL LIMIT
AGE: 65+
HEART SCORE: 2
ECG: NORMAL
RISK FACTORS: NO KNOWN RISK FACTORS
HISTORY: SLIGHTLY SUSPICIOUS

## 2025-08-28 SDOH — HEALTH STABILITY: PHYSICAL HEALTH: ON AVERAGE, HOW MANY MINUTES DO YOU ENGAGE IN EXERCISE AT THIS LEVEL?: 20 MIN

## 2025-08-28 SDOH — ECONOMIC STABILITY: INCOME INSECURITY: IN THE LAST 12 MONTHS, WAS THERE A TIME WHEN YOU WERE NOT ABLE TO PAY THE MORTGAGE OR RENT ON TIME?: NO

## 2025-08-28 SDOH — HEALTH STABILITY: PHYSICAL HEALTH: ON AVERAGE, HOW MANY DAYS PER WEEK DO YOU ENGAGE IN MODERATE TO STRENUOUS EXERCISE (LIKE A BRISK WALK)?: 3 DAYS

## 2025-08-28 SDOH — ECONOMIC STABILITY: FOOD INSECURITY: WITHIN THE PAST 12 MONTHS, YOU WORRIED THAT YOUR FOOD WOULD RUN OUT BEFORE YOU GOT MONEY TO BUY MORE.: NEVER TRUE

## 2025-08-28 SDOH — ECONOMIC STABILITY: FOOD INSECURITY: WITHIN THE PAST 12 MONTHS, THE FOOD YOU BOUGHT JUST DIDN'T LAST AND YOU DIDN'T HAVE MONEY TO GET MORE.: NEVER TRUE

## 2025-08-28 SDOH — ECONOMIC STABILITY: HOUSING INSECURITY
IN THE LAST 12 MONTHS, WAS THERE A TIME WHEN YOU DID NOT HAVE A STEADY PLACE TO SLEEP OR SLEPT IN A SHELTER (INCLUDING NOW)?: NO

## 2025-08-28 SDOH — ECONOMIC STABILITY: INCOME INSECURITY: HOW HARD IS IT FOR YOU TO PAY FOR THE VERY BASICS LIKE FOOD, HOUSING, MEDICAL CARE, AND HEATING?: NOT HARD AT ALL

## 2025-08-28 ASSESSMENT — SOCIAL DETERMINANTS OF HEALTH (SDOH)
HOW OFTEN DO YOU ATTENT MEETINGS OF THE CLUB OR ORGANIZATION YOU BELONG TO?: MORE THAN 4 TIMES PER YEAR
DO YOU BELONG TO ANY CLUBS OR ORGANIZATIONS SUCH AS CHURCH GROUPS UNIONS, FRATERNAL OR ATHLETIC GROUPS, OR SCHOOL GROUPS?: YES
HOW OFTEN DO YOU HAVE A DRINK CONTAINING ALCOHOL: NEVER
WITHIN THE PAST 12 MONTHS, YOU WORRIED THAT YOUR FOOD WOULD RUN OUT BEFORE YOU GOT THE MONEY TO BUY MORE: NEVER TRUE
HOW OFTEN DO YOU GET TOGETHER WITH FRIENDS OR RELATIVES?: TWICE A WEEK
HOW OFTEN DO YOU ATTEND CHURCH OR RELIGIOUS SERVICES?: NEVER
HOW OFTEN DO YOU HAVE SIX OR MORE DRINKS ON ONE OCCASION: NEVER
HOW HARD IS IT FOR YOU TO PAY FOR THE VERY BASICS LIKE FOOD, HOUSING, MEDICAL CARE, AND HEATING?: NOT HARD AT ALL
HOW OFTEN DO YOU GET TOGETHER WITH FRIENDS OR RELATIVES?: TWICE A WEEK
IN A TYPICAL WEEK, HOW MANY TIMES DO YOU TALK ON THE PHONE WITH FAMILY, FRIENDS, OR NEIGHBORS?: MORE THAN THREE TIMES A WEEK
HOW MANY DRINKS CONTAINING ALCOHOL DO YOU HAVE ON A TYPICAL DAY WHEN YOU ARE DRINKING: PATIENT DOES NOT DRINK
HOW OFTEN DO YOU ATTEND CHURCH OR RELIGIOUS SERVICES?: NEVER
DO YOU BELONG TO ANY CLUBS OR ORGANIZATIONS SUCH AS CHURCH GROUPS UNIONS, FRATERNAL OR ATHLETIC GROUPS, OR SCHOOL GROUPS?: YES
IN A TYPICAL WEEK, HOW MANY TIMES DO YOU TALK ON THE PHONE WITH FAMILY, FRIENDS, OR NEIGHBORS?: MORE THAN THREE TIMES A WEEK
HOW OFTEN DO YOU ATTENT MEETINGS OF THE CLUB OR ORGANIZATION YOU BELONG TO?: MORE THAN 4 TIMES PER YEAR
IN THE PAST 12 MONTHS, HAS THE ELECTRIC, GAS, OIL, OR WATER COMPANY THREATENED TO SHUT OFF SERVICE IN YOUR HOME?: NO

## 2025-08-28 ASSESSMENT — LIFESTYLE VARIABLES
SKIP TO QUESTIONS 9-10: 1
HOW OFTEN DO YOU HAVE SIX OR MORE DRINKS ON ONE OCCASION: NEVER
HOW OFTEN DO YOU HAVE A DRINK CONTAINING ALCOHOL: NEVER
AUDIT-C TOTAL SCORE: 0
HOW MANY STANDARD DRINKS CONTAINING ALCOHOL DO YOU HAVE ON A TYPICAL DAY: PATIENT DOES NOT DRINK

## 2025-08-29 ENCOUNTER — OFFICE VISIT (OUTPATIENT)
Dept: MEDICAL GROUP | Facility: MEDICAL CENTER | Age: 65
End: 2025-08-29
Payer: MEDICARE

## 2025-08-29 VITALS
HEIGHT: 68 IN | HEART RATE: 90 BPM | OXYGEN SATURATION: 95 % | DIASTOLIC BLOOD PRESSURE: 70 MMHG | TEMPERATURE: 97.3 F | SYSTOLIC BLOOD PRESSURE: 122 MMHG | BODY MASS INDEX: 23.95 KG/M2 | WEIGHT: 158 LBS

## 2025-08-29 DIAGNOSIS — Z00.00 MEDICARE ANNUAL WELLNESS VISIT, SUBSEQUENT: Primary | ICD-10-CM

## 2025-08-29 DIAGNOSIS — K76.89 HEPATIC CYST: ICD-10-CM

## 2025-08-29 DIAGNOSIS — R10.30 LOWER ABDOMINAL PAIN: ICD-10-CM

## 2025-08-29 DIAGNOSIS — K21.9 GASTROESOPHAGEAL REFLUX DISEASE, UNSPECIFIED WHETHER ESOPHAGITIS PRESENT: Chronic | ICD-10-CM

## 2025-08-29 DIAGNOSIS — G47.33 OSA (OBSTRUCTIVE SLEEP APNEA): ICD-10-CM

## 2025-08-29 DIAGNOSIS — M81.0 AGE-RELATED OSTEOPOROSIS WITHOUT CURRENT PATHOLOGICAL FRACTURE: Chronic | ICD-10-CM

## 2025-08-29 DIAGNOSIS — R10.11 RIGHT UPPER QUADRANT ABDOMINAL PAIN: ICD-10-CM

## 2025-08-29 DIAGNOSIS — J44.89 ASTHMA-COPD OVERLAP SYNDROME (HCC): ICD-10-CM

## 2025-08-29 DIAGNOSIS — K40.20 BILATERAL INGUINAL HERNIA WITHOUT OBSTRUCTION OR GANGRENE, RECURRENCE NOT SPECIFIED: ICD-10-CM

## 2025-08-29 DIAGNOSIS — Z87.891 PERSONAL HISTORY OF TOBACCO USE: ICD-10-CM

## 2025-08-29 DIAGNOSIS — M54.2 NECK PAIN ON RIGHT SIDE: ICD-10-CM

## 2025-08-29 DIAGNOSIS — K80.20 GALLSTONES: ICD-10-CM

## 2025-08-29 DIAGNOSIS — E04.1 THYROID NODULE: Chronic | ICD-10-CM

## 2025-08-29 PROBLEM — R73.03 PREDIABETES: Status: ACTIVE | Noted: 2023-02-27

## 2025-08-29 PROBLEM — Z86.39 HISTORY OF THYROID NODULE: Chronic | Status: RESOLVED | Noted: 2021-08-13 | Resolved: 2025-08-29

## 2025-08-29 PROBLEM — D21.9 LEIOMYOMA: Status: ACTIVE | Noted: 2025-08-29

## 2025-08-29 PROBLEM — F32.5 MAJOR DEPRESSIVE DISORDER WITH SINGLE EPISODE, IN FULL REMISSION (HCC): Chronic | Status: RESOLVED | Noted: 2023-08-28 | Resolved: 2025-08-29

## 2025-08-29 RX ORDER — PANTOPRAZOLE SODIUM 20 MG/1
TABLET, DELAYED RELEASE ORAL
Qty: 112 TABLET | Refills: 0 | Status: SHIPPED | OUTPATIENT
Start: 2025-08-29 | End: 2025-11-28

## 2025-08-29 ASSESSMENT — LIFESTYLE VARIABLES
HOW OFTEN DO YOU HAVE A DRINK CONTAINING ALCOHOL: NEVER
AUDIT-C TOTAL SCORE: 0
SKIP TO QUESTIONS 9-10: 1
HOW MANY STANDARD DRINKS CONTAINING ALCOHOL DO YOU HAVE ON A TYPICAL DAY: PATIENT DOES NOT DRINK
HOW OFTEN DO YOU HAVE SIX OR MORE DRINKS ON ONE OCCASION: NEVER

## 2025-08-29 ASSESSMENT — PATIENT HEALTH QUESTIONNAIRE - PHQ9: CLINICAL INTERPRETATION OF PHQ2 SCORE: 0

## 2025-08-29 ASSESSMENT — FIBROSIS 4 INDEX: FIB4 SCORE: 1.31

## 2025-08-29 ASSESSMENT — ENCOUNTER SYMPTOMS: GENERAL WELL-BEING: GOOD

## 2025-08-29 ASSESSMENT — ACTIVITIES OF DAILY LIVING (ADL): BATHING_REQUIRES_ASSISTANCE: 0
